# Patient Record
Sex: FEMALE | Race: WHITE | NOT HISPANIC OR LATINO | Employment: OTHER | URBAN - METROPOLITAN AREA
[De-identification: names, ages, dates, MRNs, and addresses within clinical notes are randomized per-mention and may not be internally consistent; named-entity substitution may affect disease eponyms.]

---

## 2017-03-20 DIAGNOSIS — M79.641 PAIN OF RIGHT HAND: ICD-10-CM

## 2017-03-20 DIAGNOSIS — M79.644 PAIN OF FINGER OF RIGHT HAND: ICD-10-CM

## 2017-03-28 ENCOUNTER — ALLSCRIPTS OFFICE VISIT (OUTPATIENT)
Dept: OTHER | Facility: OTHER | Age: 72
End: 2017-03-28

## 2017-05-10 ENCOUNTER — ALLSCRIPTS OFFICE VISIT (OUTPATIENT)
Dept: OTHER | Facility: OTHER | Age: 72
End: 2017-05-10

## 2017-05-10 DIAGNOSIS — F41.9 ANXIETY DISORDER: ICD-10-CM

## 2017-05-10 DIAGNOSIS — E78.5 HYPERLIPIDEMIA: ICD-10-CM

## 2017-05-10 DIAGNOSIS — M79.644 PAIN OF FINGER OF RIGHT HAND: ICD-10-CM

## 2017-05-10 DIAGNOSIS — M54.2 CERVICALGIA: ICD-10-CM

## 2017-05-10 DIAGNOSIS — F32.9 MAJOR DEPRESSIVE DISORDER, SINGLE EPISODE: ICD-10-CM

## 2017-05-10 DIAGNOSIS — E11.9 TYPE 2 DIABETES MELLITUS WITHOUT COMPLICATIONS (HCC): ICD-10-CM

## 2017-05-10 DIAGNOSIS — I10 ESSENTIAL (PRIMARY) HYPERTENSION: ICD-10-CM

## 2017-05-12 ENCOUNTER — ALLSCRIPTS OFFICE VISIT (OUTPATIENT)
Dept: OTHER | Facility: OTHER | Age: 72
End: 2017-05-12

## 2017-05-12 ENCOUNTER — APPOINTMENT (OUTPATIENT)
Dept: LAB | Facility: CLINIC | Age: 72
End: 2017-05-12
Payer: MEDICARE

## 2017-05-12 DIAGNOSIS — F41.9 ANXIETY DISORDER: ICD-10-CM

## 2017-05-12 DIAGNOSIS — E11.9 TYPE 2 DIABETES MELLITUS WITHOUT COMPLICATIONS (HCC): ICD-10-CM

## 2017-05-12 DIAGNOSIS — M79.644 PAIN OF FINGER OF RIGHT HAND: ICD-10-CM

## 2017-05-12 DIAGNOSIS — E78.5 HYPERLIPIDEMIA: ICD-10-CM

## 2017-05-12 DIAGNOSIS — F32.9 MAJOR DEPRESSIVE DISORDER, SINGLE EPISODE: ICD-10-CM

## 2017-05-12 DIAGNOSIS — I10 ESSENTIAL (PRIMARY) HYPERTENSION: ICD-10-CM

## 2017-05-12 LAB
ALBUMIN SERPL BCP-MCNC: 4 G/DL (ref 3.5–5)
ALP SERPL-CCNC: 132 U/L (ref 46–116)
ALT SERPL W P-5'-P-CCNC: 81 U/L (ref 12–78)
ANION GAP SERPL CALCULATED.3IONS-SCNC: 11 MMOL/L (ref 4–13)
AST SERPL W P-5'-P-CCNC: 37 U/L (ref 5–45)
BASOPHILS # BLD AUTO: 0.05 THOUSANDS/ΜL (ref 0–0.1)
BASOPHILS NFR BLD AUTO: 1 % (ref 0–1)
BILIRUB SERPL-MCNC: 0.63 MG/DL (ref 0.2–1)
BUN SERPL-MCNC: 15 MG/DL (ref 5–25)
CALCIUM SERPL-MCNC: 9.4 MG/DL (ref 8.3–10.1)
CHLORIDE SERPL-SCNC: 101 MMOL/L (ref 100–108)
CHOLEST SERPL-MCNC: 266 MG/DL (ref 50–200)
CO2 SERPL-SCNC: 27 MMOL/L (ref 21–32)
CREAT SERPL-MCNC: 0.71 MG/DL (ref 0.6–1.3)
CREAT UR-MCNC: 59.9 MG/DL
EOSINOPHIL # BLD AUTO: 0.38 THOUSAND/ΜL (ref 0–0.61)
EOSINOPHIL NFR BLD AUTO: 4 % (ref 0–6)
ERYTHROCYTE [DISTWIDTH] IN BLOOD BY AUTOMATED COUNT: 13.2 % (ref 11.6–15.1)
EST. AVERAGE GLUCOSE BLD GHB EST-MCNC: 163 MG/DL
GFR SERPL CREATININE-BSD FRML MDRD: >60 ML/MIN/1.73SQ M
GLUCOSE P FAST SERPL-MCNC: 138 MG/DL (ref 65–99)
HBA1C MFR BLD: 7.3 % (ref 4.2–6.3)
HCT VFR BLD AUTO: 39.5 % (ref 34.8–46.1)
HDLC SERPL-MCNC: 29 MG/DL (ref 40–60)
HGB BLD-MCNC: 14 G/DL (ref 11.5–15.4)
LYMPHOCYTES # BLD AUTO: 2.65 THOUSANDS/ΜL (ref 0.6–4.47)
LYMPHOCYTES NFR BLD AUTO: 31 % (ref 14–44)
MCH RBC QN AUTO: 33.7 PG (ref 26.8–34.3)
MCHC RBC AUTO-ENTMCNC: 35.4 G/DL (ref 31.4–37.4)
MCV RBC AUTO: 95 FL (ref 82–98)
MICROALBUMIN UR-MCNC: 8.3 MG/L (ref 0–20)
MICROALBUMIN/CREAT 24H UR: 14 MG/G CREATININE (ref 0–30)
MONOCYTES # BLD AUTO: 0.55 THOUSAND/ΜL (ref 0.17–1.22)
MONOCYTES NFR BLD AUTO: 6 % (ref 4–12)
NEUTROPHILS # BLD AUTO: 4.96 THOUSANDS/ΜL (ref 1.85–7.62)
NEUTS SEG NFR BLD AUTO: 58 % (ref 43–75)
NRBC BLD AUTO-RTO: 0 /100 WBCS
PLATELET # BLD AUTO: 246 THOUSANDS/UL (ref 149–390)
PMV BLD AUTO: 13.6 FL (ref 8.9–12.7)
POTASSIUM SERPL-SCNC: 4.4 MMOL/L (ref 3.5–5.3)
PROT SERPL-MCNC: 7.4 G/DL (ref 6.4–8.2)
RBC # BLD AUTO: 4.16 MILLION/UL (ref 3.81–5.12)
SODIUM SERPL-SCNC: 139 MMOL/L (ref 136–145)
TRIGL SERPL-MCNC: 579 MG/DL
TSH SERPL DL<=0.05 MIU/L-ACNC: 2.1 UIU/ML (ref 0.36–3.74)
WBC # BLD AUTO: 8.63 THOUSAND/UL (ref 4.31–10.16)

## 2017-05-12 PROCEDURE — 83036 HEMOGLOBIN GLYCOSYLATED A1C: CPT

## 2017-05-12 PROCEDURE — 82043 UR ALBUMIN QUANTITATIVE: CPT

## 2017-05-12 PROCEDURE — 80053 COMPREHEN METABOLIC PANEL: CPT

## 2017-05-12 PROCEDURE — 36415 COLL VENOUS BLD VENIPUNCTURE: CPT

## 2017-05-12 PROCEDURE — 85025 COMPLETE CBC W/AUTO DIFF WBC: CPT

## 2017-05-12 PROCEDURE — 80061 LIPID PANEL: CPT

## 2017-05-12 PROCEDURE — 82570 ASSAY OF URINE CREATININE: CPT

## 2017-05-12 PROCEDURE — 84443 ASSAY THYROID STIM HORMONE: CPT

## 2017-08-22 ENCOUNTER — ALLSCRIPTS OFFICE VISIT (OUTPATIENT)
Dept: OTHER | Facility: OTHER | Age: 72
End: 2017-08-22

## 2017-08-22 DIAGNOSIS — E11.9 TYPE 2 DIABETES MELLITUS WITHOUT COMPLICATIONS (HCC): ICD-10-CM

## 2017-08-22 DIAGNOSIS — I10 ESSENTIAL (PRIMARY) HYPERTENSION: ICD-10-CM

## 2017-08-22 DIAGNOSIS — E78.5 HYPERLIPIDEMIA: ICD-10-CM

## 2017-08-22 DIAGNOSIS — R74.02 NONSPECIFIC ELEVATION OF LEVELS OF TRANSAMINASE AND LACTIC ACID DEHYDROGENASE (LDH): ICD-10-CM

## 2017-08-22 DIAGNOSIS — R74.01 NONSPECIFIC ELEVATION OF LEVELS OF TRANSAMINASE AND LACTIC ACID DEHYDROGENASE (LDH): ICD-10-CM

## 2017-09-18 ENCOUNTER — GENERIC CONVERSION - ENCOUNTER (OUTPATIENT)
Dept: OTHER | Facility: OTHER | Age: 72
End: 2017-09-18

## 2018-01-09 NOTE — PROGRESS NOTES
Chief Complaint  PT IS HERE TODAY TO HAVE HER BP CK DUE TO IT BEING ELEVATED DURING HER LAST OV W/ DR DUMONT  PT'S BP IS WNL TODAY AND ALSO HER BP READINGS FROM HOME SEEM TO BE WNL, TODAY THE PATIENT DID BRING HER BP MONITOR W/ HER AND READING MATCHED THE BP I GOT TODAY  PT WAS INSTRUCTED TO CONT ALL PRESENT MEDS INCL ATENOLOL 50MG & LISINOPRIL 10MG, PT WILL KEEP HER SCHED APPT FOR 9/28/16  Active Problems    1  Anxiety disorder (300 00) (F41 9)   2  Bradycardia (427 89) (R00 1)   3  Depression (311) (F32 9)   4  Elevated ALT measurement (790 4) (R74 0)   5  Elevated AST (SGOT) (790 4) (R74 0)   6  Hyperlipidemia (272 4) (E78 5)   7  Hypertension (401 9) (I10)   8  Medicare annual wellness visit, initial (V70 0) (Z00 00)   9  Neoplasm of skin of scalp (239 2) (D49 2)   10  Screening for genitourinary condition (V81 6) (Z13 89)   11  Screening for neurological condition (V80 09) (Z13 89)   12  Second degree AV block, Mobitz type II (426 12) (I44 1)   13  Type 2 diabetes mellitus (250 00) (E11 9)   14  Vertigo (780 4) (R42)    Current Meds   1  Atenolol 50 MG Oral Tablet; TAKE 1 TABLET BY MOUTH ONCE DAILY  Requested for:   25Apr2016; Last Rx:25Apr2016 Ordered   2  Lamar Contour Next EZ w/Device Kit; TESTING 2-3 X'S QD; Therapy: 46BIX5456 to (Last Rx:23Mar2016)  Requested for: 58RTI6449 Ordered   3  Lamar Contour Next Test In [employer]; 2-3 X'S QD; Therapy: 62QEZ3173 to (Last May Riff)  Requested for: 86VTK3033 Ordered   4  Lamar Microlet Lancets Miscellaneous; TEST 2-3x A DAY/AS DIRECTED; Therapy: 91ZNB9867 to (Last May Riff)  Requested for: 98MMY8248 Ordered   5  ChlordiazePOXIDE HCl - 5 MG Oral Capsule; take 1 capsule by mouth three times a day   if needed; Therapy: 09Apr2012 to (Evaluate:14Ahi2975)  Requested for: 25PTA0813; Last   Rx:94Gza4782 Ordered   6  HealthWise Short Pen Needles 31G X 8 MM Miscellaneous; INJECTING FOUR TIMES   DAILY;    Therapy: 91UHE9671 to (Evaluate:20Oct2016) Requested for: 29VTK3578; Last   Rx:22Jun2016 Ordered   7  Imipramine HCl - 25 MG Oral Tablet; take 1 tablet by mouth twice a day; Therapy: 44YZU5596 to (Evaluate:20Apr2017)  Requested for: 55Hcz9607; Last   Rx:02Lpu5645 Ordered   8  Lisinopril 10 MG Oral Tablet; Take 1 tablet twice a day  Requested for: 94Mvf9040; Last   Rx:28Arf1087 Ordered   9  MetFORMIN HCl - 500 MG Oral Tablet; Take 1 tablet twice a day; Therapy: 37TKS5176 to (Evaluate:20Oct2016)  Requested for: 79HED2175; Last   Rx:22Jun2016 Ordered   10  Toujeo SoloStar 300 UNIT/ML Subcutaneous Solution Pen-injector; inject 25 units daily; Therapy: 04HSX3868 to (Last Ferd Bowling)  Requested for: 07KTS7531 Ordered   11  Vitamin C 500 MG Oral Capsule; 2qd Recorded   12  Vitamin E 400 UNIT Oral Tablet; take 2 tablet daily; Therapy: (Recorded:02Mar2016) to Recorded    Allergies    1  Biaxin TABS   2  Crestor TABS   3  Lipitor TABS   4  Penicillins   5  Peroxyl Spot Treatment GEL    Vitals  Signs [Data Includes: Current Encounter]    Systolic: 566  Diastolic: 82    Future Appointments    Date/Time Provider Specialty Site   09/28/2016 10:00 AM DANNY Barnes  [address]     Signatures   Electronically signed by :  Navi Gray, ; Jun 29 2016 11:19AM EST                       (Author)    Electronically signed by : DANNY Aparicio ; Jun 30 2016  8:35AM EST                       (Author)

## 2018-01-11 NOTE — PROGRESS NOTES
Chief Complaint  PT WAS HERE FOR BP CHECK ,I TOOK HER BP AND IT /84 AND HER MACHINE /102 I TOOK IT AGAIN AND IT /84 AND HER MACHINE /98 , I TOLD HER TO GO HOME AND CALL BACK IN ABOUT AN HR AND A HALF WITH ANOTHER READING AFTER RELAXING / RESTING FOR A LITTLE BIT ,SHE CALLED BACK WITH READINGS 178/94 -182/83 -162/84 ,SENT TO DR TIM LUGO SHE IS CURRENTLY TAKING ATENOLOL 50 MG 1 QD AND LISINOPRIL 10 MG BID ,SO HER INSTRUCTIONS ARE TO CHANGE LISINOPRIL TO 2 TABS BID OVER WEEKEND , TAKE BP PRIOR TO TAKING MEDS AND IF BP IS <130 OR <70 ONLY TAKE ONE, PT AWARE AND WILL CALL MON  WITH READINGS      Review of Systems    Preventive Quality 65 and Older: Falls Risk: The patient fell 0 times in the past 12 months  The patient currently has no urinary incontinence symptoms  Active Problems    1  Acute non intractable tension-type headache (339 10) (G44 209)   2  Acute pharyngitis (462) (J02 9)   3  Acute URI (465 9) (J06 9)   4  Anxiety disorder (300 00) (F41 9)   5  Bradycardia (427 89) (R00 1)   6  Cough (786 2) (R05)   7  Elevated ALT measurement (790 4) (R74 0)   8  Elevated AST (SGOT) (790 4) (R74 0)   9  Headache (784 0) (R51)   10  Hyperlipidemia (272 4) (E78 5)   11  Hypertension (401 9) (I10)   12  Medicare annual wellness visit, initial (V70 0) (Z00 00)   13  Neoplasm of skin of scalp (239 2) (D49 2)   14  Pain of right hand (729 5) (M79 641)   15  Pain of right thumb (729 5) (M79 644)   16  Pain, joint, shoulder, left (719 41) (M25 512)   17  Rales (786 7) (R09 89)   18  Recurrent major depressive disorder, in partial remission (296 35) (F33 41)   19  Screening for genitourinary condition (V81 6) (Z13 89)   20  Screening for neurological condition (V80 09) (Z13 89)   21  Second degree AV block, Mobitz type II (426 12) (I44 1)   22  Type 2 diabetes mellitus (250 00) (E11 9)   23  Vertigo (780 4) (R42)    Current Meds   1   Atenolol 50 MG Oral Tablet; TAKE 1 TABLET BY MOUTH ONCE DAILY  Requested for:   77IQM5844; Last RV:85BNP7783 Ordered   2  Lamar Contour Next Test In [employer]; 2-3 X'S QD; Therapy: 62DXI0372 to (Last Delmi Gibson)  Requested for: 27MDU0583 Ordered   3  ChlordiazePOXIDE HCl - 5 MG Oral Capsule; take 1 capsule by mouth three times a day   if needed; Therapy: 09Apr2012 to (Evaluate:60Xig2778)  Requested for: 01LCM3262; Last   Rx:13Mar2017 Ordered   4  Imipramine HCl - 25 MG Oral Tablet; take 1 tablet by mouth qA and 2 tab qP; Therapy: 78NXE3264 to (Vivek Matthew)  Requested for: 46YWG9623; Last   Rx:89Mjk7268 Ordered   5  Lisinopril 10 MG Oral Tablet; Take 1 tablet twice a day  Requested for: 68ZNE3539; Last   Rx:10Nov2016 Ordered   6  MetFORMIN HCl - 500 MG Oral Tablet; take 1 tablet by mouth twice a day with food; Therapy: 99KBE7424 to (Evaluate:01Jun2017)  Requested for: 99YDU6730; Last   Rx:34Vys6193 Ordered   7  Vitamin C 500 MG Oral Capsule; 2qd Recorded   8  Vitamin E 400 UNIT Oral Tablet; take 2 tablet daily; Therapy: (Recorded:02Mar2016) to Recorded    Allergies    1  Biaxin TABS   2  Crestor TABS   3  Lipitor TABS   4  Penicillins   5   Peroxyl Spot Treatment GEL    Plan  Pain of right thumb    · 2 Krystian Chacko MD, Jaime Cintron  (Orthopedic Surgery) Co-Management  *  Status: Active  Requested  for: 35BIE0246  Care Summary provided  : Yes    Signatures   Electronically signed by : Stuart Sethi, ; May 12 2017 12:45PM EST                       (Author)    Electronically signed by : DANNY Harvey ; May 13 2017  7:41PM EST                       (Author)

## 2018-01-12 VITALS
TEMPERATURE: 96.7 F | BODY MASS INDEX: 26.56 KG/M2 | DIASTOLIC BLOOD PRESSURE: 72 MMHG | WEIGHT: 136 LBS | RESPIRATION RATE: 16 BRPM | SYSTOLIC BLOOD PRESSURE: 126 MMHG | HEART RATE: 72 BPM

## 2018-01-12 NOTE — PROGRESS NOTES
Assessment    1  Elevated ALT measurement (790 4) (R74 0)   2  Hyperlipidemia (272 4) (E78 5)   3  Hypertension (401 9) (I10)   4  Type 2 diabetes mellitus (250 00) (E11 9)    Plan  Elevated ALT measurement, Hyperlipidemia, Hypertension, Type 2 diabetes mellitus    · (1) CBC/PLT/DIFF; Status:Active; Requested for:22Aug2017;    · (1) COMPREHENSIVE METABOLIC PANEL; Status:Active; Requested for:22Aug2017;    · (1) HEMOGLOBIN A1C; Status:Active; Requested for:22Aug2017;    · (1) LIPID PANEL, FASTING; Status:Active; Requested for:22Aug2017;   Hypertension    · AmLODIPine Besylate 5 MG Oral Tablet; take 1 tablet by mouth every day  Type 2 diabetes mellitus    · *VB - Foot Exam; Status:Complete;   Done: 22Aug2017 12:00AM    Discussion/Summary    #1 DMII - BGs elevated due to noncompliance with diet  Urged compliance as well as increased ambulation  Recheck lab and f/u 6m - earlier if BGs are still elevated  #2 hypertension - better but SBP still sl elevated  ? Better when pt is calm  Cont present care and recehck as above  #3 hyperlipidemia - TGs still elevated  I discussed with pt - we discussed treatment  Pt will focus on diet - recheck labs in 2m  If still elevated, consider meds  #4 elevated ALT - secondary to fatty liver and elevated TGs? Treat as above and recheck as above  #5 HM - Patient to follow-up as above  I discussed flu shot  Recheck as above  Patient to call for problems or concerns in the interim  The patient was counseled regarding diagnostic results, instructions for management, risk factor reductions, prognosis, patient and family education, impressions, risks and benefits of treatment options, importance of compliance with treatment  Possible side effects of new medications were reviewed with the patient/guardian today  The treatment plan was reviewed with the patient/guardian   The patient/guardian understands and agrees with the treatment plan      Chief Complaint  follow up visit      History of Present Illness  as above  - pt doing well  Some increased stress (family)  Pt also feels lonely  No suicidality  - pt cheating more on diet  Labs in may showed increased A1C (7 3)  TGs still > 500    - home BPs sl labile though pt states that it is due to stress  Pt denies CP, palp, lightheadedness or other CV symptoms with or without exertion  - no GI or  complaints  - no extremity complaints      Review of Systems    Constitutional: as noted in HPI  Eyes: No complaints of eye pain, no red eyes, no eyesight problems, no discharge, no dry eyes, no itching of eyes  ENT: no complaints of earache, no loss of hearing, no nose bleeds, no nasal discharge, no sore throat, no hoarseness  Cardiovascular: No complaints of slow heart rate, no fast heart rate, no chest pain, no palpitations, no leg claudication, no lower extremity edema  Respiratory: No complaints of shortness of breath, no wheezing, no cough, no SOB on exertion, no orthopnea, no PND  Gastrointestinal: No complaints of abdominal pain, no constipation, no nausea or vomiting, no diarrhea, no bloody stools  Genitourinary: No complaints of dysuria, no incontinence, no pelvic pain, no dysmenorrhea, no vaginal discharge or bleeding  Musculoskeletal: No complaints of arthralgias, no myalgias, no joint swelling or stiffness, no limb pain or swelling  Integumentary: No complaints of skin rash or lesions, no itching, no skin wounds, no breast pain or lump  Neurological: No complaints of headache, no confusion, no convulsions, no numbness, no dizziness or fainting, no tingling, no limb weakness, no difficulty walking  Psychiatric: as noted in HPI  Endocrine: No complaints of proptosis, no hot flashes, no muscle weakness, no deepening of the voice, no feelings of weakness  Hematologic/Lymphatic: No complaints of swollen glands, no swollen glands in the neck, does not bleed easily, does not bruise easily  Active Problems    1   Acute non intractable tension-type headache (339 10) (G44 209)   2  Acute pharyngitis (462) (J02 9)   3  Acute URI (465 9) (J06 9)   4  Anxiety disorder (300 00) (F41 9)   5  Bradycardia (427 89) (R00 1)   6  Cervicalgia (723 1) (M54 2)   7  Cough (786 2) (R05)   8  Elevated ALT measurement (790 4) (R74 0)   9  Elevated AST (SGOT) (790 4) (R74 0)   10  Headache (784 0) (R51)   11  Hyperlipidemia (272 4) (E78 5)   12  Hypertension (401 9) (I10)   13  Medicare annual wellness visit, initial (V70 0) (Z00 00)   14  Neoplasm of skin of scalp (239 2) (D49 2)   15  Pain of right hand (729 5) (M79 641)   16  Pain of right thumb (729 5) (M79 644)   17  Pain, joint, shoulder, left (719 41) (M25 512)   18  Rales (786 7) (R09 89)   19  Recurrent major depressive disorder, in partial remission (296 35) (F33 41)   20  Screening for genitourinary condition (V81 6) (Z13 89)   21  Screening for neurological condition (V80 09) (Z13 89)   22  Second degree AV block, Mobitz type II (426 12) (I44 1)   23  Type 2 diabetes mellitus (250 00) (E11 9)   24  Vertigo (780 4) (R42)    Past Medical History    1  History of Anxiety (300 00) (F41 9)   2  History of Cough (786 2) (R05)   3  History of acute bronchitis (V12 69) (Z87 09)   4  History of acute sinusitis (V12 69) (Z87 09)   5  History of gastroenteritis (V12 79) (Z87 19)   6  History of insect bite (V15 59) (Z87 828)   7  History of sebaceous cyst (V13 3) (Z87 2)   8  History of Poison ivy dermatitis (692 6) (L23 7)    The active problems and past medical history were reviewed and updated today  Surgical History    1  History of Pacemaker Permanent Placement    The surgical history was reviewed and updated today  Family History  Sister    1  Family history of dementia (V17 2) (Z81 8)   2   Family history of type 2 diabetes mellitus (V18 0) (Z83 3)    Social History    · Current Every Day Smoker (305 1)   · Marital History -    · Occupation:  The social history was reviewed and updated today  Current Meds   1  AmLODIPine Besylate 5 MG Oral Tablet; take 1 tablet by mouth every day; Therapy: 71DAA3741 to [phone])  Requested for: 85VFO7474; Last   Rx:88Wpo0598 Ordered   2  Atenolol 50 MG Oral Tablet; TAKE 1 TABLET BY MOUTH ONCE DAILY  Requested for:   37IWK2138; Last ZB:58MVB1753 Ordered   3  Lamar Contour Next Test In [employer]; 2-3 X'S QD; Therapy: 51QNQ3209 to (Last Vi Borrego)  Requested for: 06CCA2751 Ordered   4  ChlordiazePOXIDE HCl - 5 MG Oral Capsule; take 1 capsule by mouth three times a day   if needed; Therapy: 69Thl6176 to (Gilmer Lassiter)  Requested for: 11Aug2017; Last   Rx:11Aug2017 Ordered   5  Imipramine HCl - 25 MG Oral Tablet; take 1 tablet by mouth qA and 2 tab qP; Therapy: 05MSA9531 to (Shana Velez)  Requested for: 98EYW7556; Last   Rx:99Oxt6487 Ordered   6  Lisinopril 20 MG Oral Tablet; TAKE 1 TABLET TWICE DAILY  Requested for: 11Aug2017;   Last Rx:11Aug2017 Ordered   7  MetFORMIN HCl - 500 MG Oral Tablet; take 1 tablet by mouth twice a day with food; Therapy: 36MHF0076 to (Evaluate:01Jun2017)  Requested for: 54RVB9489; Last   Rx:91Hlz3072 Ordered   8  Vitamin C 500 MG Oral Capsule; 2qd Recorded   9  Vitamin E 400 UNIT Oral Tablet; take 2 tablet daily; Therapy: (Recorded:02Mar2016) to Recorded    The medication list was reviewed and updated today  Allergies    1  Biaxin TABS   2  Crestor TABS   3  Lipitor TABS   4  Penicillins   5  Peroxyl Spot Treatment GEL    Vitals  Vital Signs    Recorded: 22Aug2017 10:42AM   Temperature 97 5 F   Heart Rate 72   Systolic 629   Diastolic 84   Height 5 ft    Weight 141 lb    BMI Calculated 27 54   BSA Calculated 1 61     Physical Exam    Constitutional   General appearance: No acute distress, well appearing and well nourished  Head and Face   Head and face: Normal     Palpation of the face and sinuses: No sinus tenderness      Eyes   Conjunctiva and lids: No swelling, erythema or discharge  Pupils and irises: Equal, round, reactive to light  Ophthalmoscopic examination: Abnormal   Fundi difficult to see the no gross papilledema appreciated  Ears, Nose, Mouth, and Throat   External inspection of ears and nose: Normal     Otoscopic examination: Tympanic membranes translucent with normal light reflex  Canals patent without erythema  Nasal mucosa, septum, and turbinates: Normal without edema or erythema  Lips, teeth, and gums: Normal, good dentition  Oropharynx: Normal with no erythema, edema, exudate or lesions  Neck   Neck: Supple, symmetric, trachea midline, no masses  Pulmonary   Respiratory effort: No increased work of breathing or signs of respiratory distress  Auscultation of lungs: Abnormal   Scattered dry crackles in the right base - unchanged  Cardiovascular   Auscultation of heart: Normal rate and rhythm, normal S1 and S2, no murmurs  Carotid pulses: 2+ bilaterally  Abdominal aorta: Normal     Pedal pulses: 2+ bilaterally  Peripheral vascular exam: Normal     Examination of extremities for edema and/or varicosities: Normal     Abdomen   Abdomen: Non-tender, no masses  Liver and spleen: No hepatomegaly or splenomegaly  Lymphatic   Palpation of lymph nodes in neck: No lymphadenopathy  Palpation of lymph nodes in other areas: No lymphadenopathy  Musculoskeletal   Gait and station: Normal     Digits and nails: Abnormal   Mild diffuse osteoarthritis changes  Joints, bones, and muscles: Normal     Muscle strength/tone: Normal     Skin   Skin and subcutaneous tissue: Normal without rashes or lesions  Neurologic   Cranial nerves: Cranial nerves II-XII intact  Cortical function: Normal mental status  Reflexes: 2+ and symmetric  Sensation: No sensory loss  Psychiatric   Judgment and insight: Normal     Orientation to person, place, and time: Normal     Recent and remote memory: Intact  Mood and affect: Abnormal   As above  PHQ-9 = 4  Socks and shoes removed, Right Foot Findings: normal foot, no swelling, no erythema  The right toes were normal and had full range of motion  The sensory exam showed normal vibratory sensation at the level of the toes on the right  Normal tactile sensation with monofilament testing throughout the right foot  Socks and shoes removed, Left Foot Findings: normal foot, no swelling, no erythema  The left toes were normal and had full range of motion  The sensory exam showed normal vibratory sensation at the level of the toes on the left  Normal tactile sensation with monofilament testing throughout the left foot  Pulses:   2+ in the dorsalis pedis on the right  Pulses:   2+ in the dorsalis pedis on the left  Assign Risk Category: 0: No loss of protective sensation, no deformity  No present risk  Future Appointments    Date/Time Provider Specialty Site   12/06/2017 01:00 PM DANNY Chance   [address] [facility]     Signatures   Electronically signed by : DANNY Barajas ; Aug 23 2017  7:49AM EST                       (Author)

## 2018-01-14 VITALS
BODY MASS INDEX: 28.07 KG/M2 | SYSTOLIC BLOOD PRESSURE: 170 MMHG | WEIGHT: 143 LBS | HEART RATE: 76 BPM | RESPIRATION RATE: 12 BRPM | TEMPERATURE: 97 F | DIASTOLIC BLOOD PRESSURE: 82 MMHG | HEIGHT: 60 IN

## 2018-01-14 VITALS
BODY MASS INDEX: 27.68 KG/M2 | SYSTOLIC BLOOD PRESSURE: 142 MMHG | DIASTOLIC BLOOD PRESSURE: 84 MMHG | HEIGHT: 60 IN | HEART RATE: 72 BPM | TEMPERATURE: 97.5 F | WEIGHT: 141 LBS

## 2018-01-15 NOTE — RESULT NOTES
Verified Results  (1) LIPID PANEL, FASTING 28Fjn4450 07:10AM Jesenia Pope Human     Test Name Result Flag Reference   CHOLESTEROL, TOTAL 257 mg/dL H 125-200   HDL CHOLESTEROL 28 mg/dL L > OR = 46   TRIGLICERIDES 765 mg/dL H <150   LDL-CHOLESTEROL  mg/dL (calc)  <130   LDL cholesterol not calculated  Triglyceride levels  greater than 400 mg/dL invalidate calculated LDL results  Desirable range <100 mg/dL for patients with CHD or  diabetes and <70 mg/dL for diabetic patients with  known heart disease  CHOL/HDLC RATIO 9 2 (calc) H < OR = 5 0   NON HDL CHOLESTEROL 229 mg/dL (calc) H    Target for non-HDL cholesterol is 30 mg/dL higher than   LDL cholesterol target  (Q) MICROALBUMIN, RANDOM URINE (W/CREATININE) 82Zhj3872 07:10AM Jesenia Pope [facility]     Test Name Result Flag Reference   CREATININE, RANDOM URINE 106 mg/dL     MICROALBUMIN 1 2 mg/dL     Reference Range  Not established   MICROALBUMIN/CREATININE$RATIO, RANDOM URINE 11 mcg/mg creat  <30   The ADA defines abnormalities in albumin  excretion as follows:     Category         Result (mcg/mg creatinine)     Normal                    <30  Microalbuminuria            Clinical albuminuria   > OR = 300     The ADA recommends that at least two of three  specimens collected within a 3-6 month period be  abnormal before considering a patient to be  within a diagnostic category  (1) COMPREHENSIVE METABOLIC PANEL [id number] 32:71FC Jesenia Pope Human     Test Name Result Flag Reference   GLUCOSE 128 mg/dL H 65-99   Fasting reference interval   UREA NITROGEN (BUN) 29 mg/dL H 7-25   CREATININE 0 83 mg/dL  0 60-0 93   For patients >52years of age, the reference limit  for Creatinine is approximately 13% higher for people  identified as -American  eGFR NON-AFR   AMERICAN 71 mL/min/1 73m2  > OR = 60   eGFR AFRICAN AMERICAN 83 mL/min/1 73m2  > OR = 60   BUN/CREATININE RATIO 35 (calc) H 6-22   ALT 33 U/L H 6-29   ALBUMIN 4 2 g/dL  3 6-5 1   GLOBULIN 2 5 g/dL (calc)  1 9-3 7   ALBUMIN/GLOBULIN RATIO 1 7 (calc)  1 0-2 5   BILIRUBIN, TOTAL 0 6 mg/dL  0 2-1 2   ALKALINE PHOSPHATASE 104 U/L     AST 20 U/L  10-35   SODIUM 140 mmol/L  135-146   POTASSIUM 4 5 mmol/L  3 5-5 3   CHLORIDE 103 mmol/L     CARBON DIOXIDE 28 mmol/L  20-31   CALCIUM 9 5 mg/dL  8 6-10 4   PROTEIN, TOTAL 6 7 g/dL  6 1-8 1     (1) CBC/PLT/DIFF 22Sep2016 07:10AM Jesenia Pope Human     Test Name Result Flag Reference   WHITE BLOOD CELL COUNT 12 5 Thousand/uL H 3 8-10 8   RED BLOOD CELL COUNT 4 22 Million/uL  3 80-5 10   HEMOGLOBIN 14 2 g/dL  11 7-15 5   HEMATOCRIT 39 2 %  35 0-45 0   MCV 93 1 fL  80 0-100 0   MCH 33 6 pg H 27 0-33 0   EOSINOPHILS 5 3 %     BASOPHILS 0 4 %     ABSOLUTE MONOCYTES 638 cells/uL  200-950   ABSOLUTE EOSINOPHILS 663 cells/uL H    ABSOLUTE BASOPHILS 50 cells/uL  0-200   NEUTROPHILS 62 9 %     LYMPHOCYTES 26 3 %     MONOCYTES 5 1 %     MCHC 36 2 g/dL H 32 0-36 0   RDW 13 5 %  11 0-15 0   PLATELET COUNT 406 Thousand/uL  140-400   MPV 12 0 fL H 7 5-11 5   ABSOLUTE NEUTROPHILS 7863 cells/uL H 1225-2777   ABSOLUTE LYMPHOCYTES 3288 cells/uL  850-3900     (Q) TSH, 3RD GENERATION 22Sep2016 07:10AM Jesenia Pope Human     Test Name Result Flag Reference   TSH 2 02 mIU/L  0 40-4 50     (Q) HEMOGLOBIN A1c 22Sep2016 07:10AM Ludwig Pope   REPORT COMMENT:  FASTING:YES     Test Name Result Flag Reference   HEMOGLOBIN A1c 6 2 % of total Hgb H <5 7   According to ADA guidelines, hemoglobin A1c <7 0%  represents optimal control in non-pregnant diabetic  patients  Different metrics may apply to specific  patient populations  Standards of Medical Care in    Diabetes Care   2013;36:s11-s66     For the purpose of screening for the presence of  diabetes  <5 7%       Consistent with the absence of diabetes  5 7-6 4%    Consistent with increased risk for diabetes              (prediabetes)  >or=6 5%    Consistent with diabetes     This assay result is consistent with a higher risk  of diabetes  Currently, no consensus exists for use of hemoglobin  A1c for diagnosis of diabetes for children

## 2018-01-16 NOTE — RESULT NOTES
Verified Results  (1) COMPREHENSIVE METABOLIC PANEL [id number] 68:53CV Surjit Pope     Test Name Result Flag Reference   GLUCOSE 146 mg/dL H 65-99   Fasting reference interval   UREA NITROGEN (BUN) 21 mg/dL  7-25   CREATININE 0 79 mg/dL  0 60-0 93   For patients >52years of age, the reference limit  for Creatinine is approximately 13% higher for people  identified as -American  eGFR NON-AFR  AMERICAN 76 mL/min/1 73m2  > OR = 60   eGFR AFRICAN AMERICAN 88 mL/min/1 73m2  > OR = 60   BUN/CREATININE RATIO   4-82   NOT APPLICABLE (calc)   SODIUM 139 mmol/L  135-146   POTASSIUM 4 4 mmol/L  3 5-5 3   CHLORIDE 104 mmol/L     CARBON DIOXIDE 26 mmol/L  19-30   CALCIUM 9 2 mg/dL  8 6-10 4   PROTEIN, TOTAL 6 8 g/dL  6 1-8 1   ALBUMIN 4 0 g/dL  3 6-5 1   GLOBULIN 2 8 g/dL (calc)  1 9-3 7   ALBUMIN/GLOBULIN RATIO 1 4 (calc)  1 0-2 5   BILIRUBIN, TOTAL 0 8 mg/dL  0 2-1 2   ALKALINE PHOSPHATASE 103 U/L     AST 29 U/L  10-35   ALT 53 U/L H 6-29     (Q) HEMOGLOBIN A1c [id number] 07:43AM Ludwig Pope   REPORT COMMENT:  FASTING:YES     Test Name Result Flag Reference   HEMOGLOBIN A1c 6 9 % of total Hgb H <5 7   According to ADA guidelines, hemoglobin A1c <7 0%  represents optimal control in non-pregnant diabetic  patients  Different metrics may apply to specific  patient populations  Standards of Medical Care in  [phone]  Diabetes Care  2013;36:s11-s66     For the purpose of screening for the presence of  diabetes  <5 7%       Consistent with the absence of diabetes  5 7-6 4%    Consistent with increased risk for diabetes              (prediabetes)  >or=6 5%    Consistent with diabetes     This assay result is consistent with diabetes  mellitus  Currently, no consensus exists for use of hemoglobin  A1c for diagnosis of diabetes for children

## 2018-01-22 VITALS
BODY MASS INDEX: 28.27 KG/M2 | SYSTOLIC BLOOD PRESSURE: 192 MMHG | HEART RATE: 84 BPM | DIASTOLIC BLOOD PRESSURE: 94 MMHG | WEIGHT: 144 LBS | TEMPERATURE: 97.2 F | HEIGHT: 60 IN

## 2018-01-24 ENCOUNTER — TELEPHONE (OUTPATIENT)
Dept: FAMILY MEDICINE CLINIC | Facility: CLINIC | Age: 73
End: 2018-01-24

## 2018-01-24 DIAGNOSIS — J01.90 ACUTE SINUSITIS, RECURRENCE NOT SPECIFIED, UNSPECIFIED LOCATION: Primary | ICD-10-CM

## 2018-01-24 RX ORDER — AZITHROMYCIN 250 MG/1
TABLET, FILM COATED ORAL
Qty: 6 TABLET | Refills: 0 | Status: SHIPPED | OUTPATIENT
Start: 2018-01-24 | End: 2018-01-29

## 2018-01-29 ENCOUNTER — TELEPHONE (OUTPATIENT)
Dept: FAMILY MEDICINE CLINIC | Facility: CLINIC | Age: 73
End: 2018-01-29

## 2018-01-29 NOTE — TELEPHONE ENCOUNTER
Spoke with Dr Lisa Pitt and [facility]  Discussed lab results and other issues  She is concerned re: pt's mood   We will call pt to bring her in for visit this week or next

## 2018-02-13 ENCOUNTER — OFFICE VISIT (OUTPATIENT)
Dept: FAMILY MEDICINE CLINIC | Facility: CLINIC | Age: 73
End: 2018-02-13
Payer: MEDICARE

## 2018-02-13 VITALS
WEIGHT: 143.8 LBS | TEMPERATURE: 98.7 F | DIASTOLIC BLOOD PRESSURE: 84 MMHG | SYSTOLIC BLOOD PRESSURE: 144 MMHG | HEIGHT: 60 IN | BODY MASS INDEX: 28.23 KG/M2 | HEART RATE: 76 BPM

## 2018-02-13 DIAGNOSIS — Z13.5 SCREENING FOR GLAUCOMA: ICD-10-CM

## 2018-02-13 DIAGNOSIS — Z11.59 NEED FOR HEPATITIS C SCREENING TEST: ICD-10-CM

## 2018-02-13 DIAGNOSIS — I10 ESSENTIAL HYPERTENSION: ICD-10-CM

## 2018-02-13 DIAGNOSIS — E78.2 MIXED HYPERLIPIDEMIA: ICD-10-CM

## 2018-02-13 DIAGNOSIS — Z00.00 MEDICARE ANNUAL WELLNESS VISIT, SUBSEQUENT: Primary | ICD-10-CM

## 2018-02-13 DIAGNOSIS — E11.9 TYPE 2 DIABETES MELLITUS WITHOUT COMPLICATION, WITHOUT LONG-TERM CURRENT USE OF INSULIN (HCC): ICD-10-CM

## 2018-02-13 DIAGNOSIS — Z13.820 SCREENING FOR OSTEOPOROSIS: ICD-10-CM

## 2018-02-13 DIAGNOSIS — Z12.39 SCREENING FOR MALIGNANT NEOPLASM OF BREAST: ICD-10-CM

## 2018-02-13 DIAGNOSIS — Z12.11 SCREENING FOR COLON CANCER: ICD-10-CM

## 2018-02-13 DIAGNOSIS — F41.9 ANXIETY: ICD-10-CM

## 2018-02-13 PROBLEM — F33.41 RECURRENT MAJOR DEPRESSIVE DISORDER, IN PARTIAL REMISSION (HCC): Status: ACTIVE | Noted: 2017-05-10

## 2018-02-13 PROBLEM — M54.2 NECK PAIN: Status: ACTIVE | Noted: 2017-06-06

## 2018-02-13 PROCEDURE — G0439 PPPS, SUBSEQ VISIT: HCPCS | Performed by: FAMILY MEDICINE

## 2018-02-13 PROCEDURE — 99214 OFFICE O/P EST MOD 30 MIN: CPT | Performed by: FAMILY MEDICINE

## 2018-02-13 RX ORDER — IMIPRAMINE HCL 25 MG
25 TABLET ORAL 2 TIMES DAILY
Qty: 180 TABLET | Refills: 3 | Status: SHIPPED | OUTPATIENT
Start: 2018-02-13 | End: 2018-12-04 | Stop reason: SDUPTHER

## 2018-02-13 RX ORDER — AMLODIPINE BESYLATE 5 MG/1
5 TABLET ORAL DAILY
Qty: 90 TABLET | Refills: 3 | Status: SHIPPED | OUTPATIENT
Start: 2018-02-13 | End: 2019-05-01 | Stop reason: SDUPTHER

## 2018-02-13 RX ORDER — ATENOLOL 50 MG/1
50 TABLET ORAL DAILY
Qty: 90 TABLET | Refills: 3 | Status: SHIPPED | OUTPATIENT
Start: 2018-02-13 | End: 2018-05-07 | Stop reason: SDUPTHER

## 2018-02-13 RX ORDER — CHLORDIAZEPOXIDE HYDROCHLORIDE 5 MG/1
CAPSULE, GELATIN COATED ORAL
COMMUNITY
Start: 2012-04-09 | End: 2018-02-13 | Stop reason: SDUPTHER

## 2018-02-13 RX ORDER — LISINOPRIL 20 MG/1
1 TABLET ORAL 2 TIMES DAILY
COMMUNITY
End: 2018-02-13 | Stop reason: SDUPTHER

## 2018-02-13 RX ORDER — CYANOCOBALAMIN (VITAMIN B-12) 500 MCG
2 LOZENGE ORAL DAILY
COMMUNITY

## 2018-02-13 RX ORDER — ATENOLOL 50 MG/1
1 TABLET ORAL DAILY
COMMUNITY
End: 2018-02-13 | Stop reason: SDUPTHER

## 2018-02-13 RX ORDER — AMLODIPINE BESYLATE 5 MG/1
1 TABLET ORAL DAILY
COMMUNITY
Start: 2017-05-25 | End: 2018-02-13 | Stop reason: SDUPTHER

## 2018-02-13 RX ORDER — ASCORBIC ACID 500 MG
TABLET ORAL DAILY
COMMUNITY

## 2018-02-13 RX ORDER — LISINOPRIL 20 MG/1
20 TABLET ORAL 2 TIMES DAILY
Qty: 90 TABLET | Refills: 3 | Status: SHIPPED | OUTPATIENT
Start: 2018-02-13 | End: 2019-01-25 | Stop reason: SDUPTHER

## 2018-02-13 RX ORDER — IMIPRAMINE HCL 25 MG
TABLET ORAL
COMMUNITY
Start: 2014-07-25 | End: 2018-02-13 | Stop reason: SDUPTHER

## 2018-02-13 RX ORDER — CHLORDIAZEPOXIDE HYDROCHLORIDE 5 MG/1
5 CAPSULE, GELATIN COATED ORAL 2 TIMES DAILY
Qty: 180 CAPSULE | Refills: 3 | Status: SHIPPED | OUTPATIENT
Start: 2018-02-13 | End: 2018-09-20 | Stop reason: SDUPTHER

## 2018-02-13 NOTE — PATIENT INSTRUCTIONS
Thank you for enrolling in [facility]  Please follow the instructions below to securely access your online medical record  [employer] allows you to send messages to your doctor, view your test results, renew your prescriptions, schedule appointments, and more  [address] uses Single Sign on (SSO) Technology for you to log in and access our [facility], including [employer]  No more remembering multiple user names and passwords! We are going to guide you through, step by step, to help you set up your [employer] account which will provide access to your [employer] account  How Do I Sign Up? 1  In your Internet browser, go to [email or web address]       2  Click on the   [employer] patient account and then click Dont have an                 Account? Create one now      3  Enter your demographic information and chose a user name (email address) and password  Think of one that is secure and easy to remember  Enter a Referral code if you have one (this is not your [employer] code ) Accept the Terms and Conditions and the Privacy Policy  4  Select your security questions that you will use to reset your password should you forget it  Click Submit  5  Enter your [employer] Activation Code exactly as it appears below  You will not need to use this code after you have completed the sign-up process  If you do not sign up before the expiration date, you must request a new code  [employer] Activation Code: Activation code not generated  Current [employer] Status: Patient Declined    6  Confirm your email address  An email confirmation was sent to you  Please open that email and click Confirm your Email   You should then be redirected to our [employer] Single sign on page, where you will log on with the user name and password you created! Proceed to the [employer] Icon to view your personal health information          Additional Information  If you have questions, you can e-mail [email or web address] or call [phone] to talk to our customer support staff  Remember, MyChart is NOT to be used for urgent needs  For medical emergencies, dial 911

## 2018-02-13 NOTE — PROGRESS NOTES
HPI:  Krystian Lr is a 67 y o  female here for her Subsequent Wellness Visit  Patient Active Problem List   Diagnosis    Anxiety disorder    Neck pain    Elevated ALT measurement    Elevated AST (SGOT)    Hyperlipidemia    Hypertension    Recurrent major depressive disorder, in partial remission ([facility] )    Second degree AV block, Mobitz type II    Type 2 diabetes mellitus ([email or web address] )     Past Medical History:   Diagnosis Date    Gastroenteritis      Past Surgical History:   Procedure Laterality Date    ATRIAL CARDIAC PACEMAKER INSERTION       Family History   Problem Relation Age of Onset    Diabetes Sister     Dementia Sister      History   Smoking Status    Current Every Day Smoker   Smokeless Tobacco    Not on file     History   Alcohol use Not on file      History   Drug use: Unknown     /84   Pulse 76   Temp 98 7 °F (37 1 °C)   Ht 5' (1 524 m)   Wt 65 2 kg (143 lb 12 8 oz)   BMI 28 08 kg/m²       Current Outpatient Prescriptions   Medication Sig Dispense Refill    amLODIPine (NORVASC) 5 mg tablet Take 1 tablet (5 mg total) by mouth daily 90 tablet 3    ascorbic acid (VITAMIN C) 500 mg tablet Take by mouth daily      atenolol (TENORMIN) 50 mg tablet Take 1 tablet (50 mg total) by mouth daily 90 tablet 3    chlordiazePOXIDE (LIBRIUM) 5 mg capsule Take 1 capsule (5 mg total) by mouth 2 (two) times a day 180 capsule 3    Glucose Blood (ZHANG CONTOUR NEXT TEST VI) by In Vitro route      imipramine (TOFRANIL) 25 mg tablet Take 1 tablet (25 mg total) by mouth 2 (two) times a day 180 tablet 3    lisinopril (ZESTRIL) 20 mg tablet Take 1 tablet (20 mg total) by mouth 2 (two) times a day 90 tablet 3    metFORMIN (GLUCOPHAGE) 500 mg tablet Take 1 tablet (500 mg total) by mouth 2 (two) times a day with meals 180 tablet 3    Vitamin E 400 units TABS Take 2 tablets by mouth daily       No current facility-administered medications for this visit        Allergies   Allergen Reactions    Atorvastatin      Reaction Date: 23Dec2011;     Clarithromycin      Reaction Date: 23Dec2011;     Hydrogen Peroxide      Reaction Date: 23Dec2011;     Levofloxacin     Other      Perioxol (mouth rinse)    Penicillins     Rosuvastatin      Reaction Date: 23Dec2011;      Immunization History   Administered Date(s) Administered    Tuberculin Skin Test-PPD Intradermal 04/14/2008       Patient Care Team:  Isidra Lay MD as PCP - Raine Bustamante MD      Medicare Screening Tests and Risk Assessments:  AWV Clinical     ISAR:       Once in a Lifetime Medicare Screening:       Medicare Screening Tests and Risk Assessment:   AAA Risk Assessment    Osteoporosis Risk Assessment    HIV Risk Assessment        Drug and Alcohol Use:   Tobacco use    Tobacco use duration    Tobacco Cessation Readiness    Alcohol use    Alcohol Treatment Readiness   Illicit Drug Use        Diet & Exercise:   Diet   How many servings a day of the following:   Exercise        Cognitive Impairment Screening:   Cognitive Impairment Screening        Functional Ability/Level of Safety:   Hearing    Hearing Impairment Assessment    Current Activities    Help needed with the folllowing:    ADL    Fall Risk   Injury History       Home Safety:   Home Safety Risk Factors       Advanced Directives:   Advanced Directives    Patient's End of Life Decisions        Urinary Incontinence:       Glaucoma:            Provider Screening     Preventative Screening/Counseling:   Cardiovascular Screening/Counseling:   (Labs Q5 years, EKG optional one-time)   General:  Risks and Benefits Discussed Counseling:  Healthy Diet, Improve Blood Pressure, Improve Exercise Tolerance, Improve Cholesterol          Diabetes Screening/Counseling:   (2 tests/year if Pre-Diabetes or 1 test/year if no Diabetes)   General:  Screening Not Indicated           Colorectal Cancer Screening/Counseling:   (FOBT Q1 yr; Flex Sig Q4 yrs or Q10 yrs after Screening Colonoscopy; Screening Colonoscpy Q2 yrs High Risk or Q10 yrs Low Risk; Barium Enema Q2 yrs High Risk or Q4 yrs Low Risk)   General:  Patient Declines, Risks and Benefits Discussed           Prostate Cancer Screening/Counseling:   (Annual)          Breast Cancer Screening/Counseling:   (Baseline Age 28 - 43; Annual Age 36+)   General:  Patient Declines, Risks and Benefits Discussed          Cervical Cancer Screening/Counseling:   (Annual for High Risk or Childbearing Age with Abnormal Pap in Last 3 yrs; Every 2 all others)   General:  Risks and Benefits Discussed, Patient Declines           Osteoporosis Screening/Counseling:   (Every 2 Yrs if at risk or more if medically necessary)   General:  Risks and Benefits Discussed, Patient Declines           AAA Screening/Counseling:   (Once per Lifetime with risk factors)          Glaucoma Screening/Counseling:   (Annual)   General:  Risks and Benefits Discussed, Screening Current          HIV Screening/Counseling:   (Voluntary; Once annually for high risk OR 3 times for Pregnancy at diagnosis of IUP; 3rd trimester; and at Labor         Hepatitis C Screening:   Hepatitis C Counseling Provided:   Yes               Immunizations:   Influenza (annual):  Risks & Benefits Discussed, Patient Declines   Pneumococcal (Once in a Lifetime):  Risks & Benefits Discussed, Patient Declines   Zostavax (Medicare D Coverage, Pt >70 yo):  Risks & Benefits Discussed, Patient Declines   TD (Non-Medicare Wellness  Visit required):  Risks & Benefits Discussed, Patient Declines       Other Preventative Couseling (Non-Medicare Wellness Visit Required):   nutrition counseling performed, weight reduction was discussed, Increased physical activity counseling given       Referrals (Non-Medicare Wellness Visit Required):       Medical Equipment/Suppliers:           No exam data present  Reviewed Updated [facility] Prior Wellness Visits:   Last Medicare wellness visit information was reviewed, patient interviewed , no change since last AWVyes  Last Medicare wellness visit information was reviewed, patient interviewed and updates made to the record today yes    Assessment and Plan:  1  Type 2 diabetes mellitus without complication, without long-term current use of insulin (HCC)  Comprehensive metabolic panel    Hemoglobin A1c    Lipid panel    Microalbumin / creatinine urine ratio    TSH, 3rd generation    metFORMIN (GLUCOPHAGE) 500 mg tablet   2  Medicare annual wellness visit, subsequent     3  Screening for glaucoma     4  Need for hepatitis C screening test  Hepatitis C antibody   5  Screening for malignant neoplasm of breast     6  Screening for osteoporosis     7  Essential hypertension  amLODIPine (NORVASC) 5 mg tablet    atenolol (TENORMIN) 50 mg tablet    lisinopril (ZESTRIL) 20 mg tablet   8  Anxiety  chlordiazePOXIDE (LIBRIUM) 5 mg capsule    imipramine (TOFRANIL) 25 mg tablet   9   Mixed hyperlipidemia         Health Maintenance Due   Topic Date Due    Hepatitis C Screening  1945    DTaP,Tdap,and Td Vaccines (1 - Tdap) 10/08/1952    OPHTHALMOLOGY EXAM  10/08/1955    Depression Screening PHQ-9  10/08/1957    Fall Risk  10/08/2010    Urinary Incontinence Screening  10/08/2010    PNEUMOCOCCAL POLYSACCHARIDE VACCINE AGE 65 AND OVER  10/08/2010    GLAUCOMA SCREENING 67+ YR  10/08/2012    INFLUENZA VACCINE  09/01/2017

## 2018-02-13 NOTE — PROGRESS NOTES
Assessment/Plan:    1  DMII - check labs  Urged compliance with diet  Increase exercise as tolerated  Recheck 6m - earlier if bgs are elevated    2  HTN - controlled  Cont present meds  Check labs    3  Anxiety - meds refilled  Recheck as above    4  Hyperlipidemia - check labs as above    5  HM - AWV done  Check labs  Refuses mammo, colonoscopy, Dexa and all immunization recommendations  Pt understands risks  Recheck 6m  Subjective:      Patient ID: Landry Queen is a 67 y o  female  - pt states that she was cheating on her diet over the Winter but has been doing better over the past 2 weeks  Does not check home Bgs regularly  This morning bg was 154  Up to date with eye exams  Due for labs  - pt up to date with Cardio  No Cp, palp, lightheadedness or other CV symptoms  - no GI or  complaints  - refuses mammo, dexa, immunizations or other HM exams        The following portions of the patient's history were reviewed and updated as appropriate:   She  has a past medical history of Gastroenteritis  She  does not have any pertinent problems on file  She  has a past surgical history that includes Atrial cardiac pacemaker insertion  Her family history includes Dementia in her sister; Diabetes in her sister  She  reports that she has been smoking  She does not have any smokeless tobacco history on file  Her alcohol and drug histories are not on file    Current Outpatient Prescriptions   Medication Sig Dispense Refill    amLODIPine (NORVASC) 5 mg tablet Take 1 tablet (5 mg total) by mouth daily 90 tablet 3    ascorbic acid (VITAMIN C) 500 mg tablet Take by mouth daily      atenolol (TENORMIN) 50 mg tablet Take 1 tablet (50 mg total) by mouth daily 90 tablet 3    chlordiazePOXIDE (LIBRIUM) 5 mg capsule Take 1 capsule (5 mg total) by mouth 2 (two) times a day 180 capsule 3    Glucose Blood (ZHANG CONTOUR NEXT TEST VI) by In Vitro route      imipramine (TOFRANIL) 25 mg tablet Take 1 tablet (25 mg total) by mouth 2 (two) times a day 180 tablet 3    lisinopril (ZESTRIL) 20 mg tablet Take 1 tablet (20 mg total) by mouth 2 (two) times a day 90 tablet 3    metFORMIN (GLUCOPHAGE) 500 mg tablet Take 1 tablet (500 mg total) by mouth 2 (two) times a day with meals 180 tablet 3    Vitamin E 400 units TABS Take 2 tablets by mouth daily       No current facility-administered medications for this visit  No current outpatient prescriptions on file prior to visit  No current facility-administered medications on file prior to visit  She is allergic to atorvastatin; clarithromycin; hydrogen peroxide; levofloxacin; other; penicillins; and rosuvastatin       Review of Systems   Constitutional: Negative  HENT: Negative  Eyes: Negative  Respiratory: Negative  Cardiovascular: Negative  Gastrointestinal: Negative  Endocrine: Negative  Genitourinary: Negative  Musculoskeletal: Positive for arthralgias  Negative for back pain, gait problem, joint swelling, neck pain and neck stiffness  Skin: Negative  Allergic/Immunologic: Negative  Neurological: Negative  Hematological: Negative  Psychiatric/Behavioral: Positive for dysphoric mood  Negative for behavioral problems, confusion and sleep disturbance  The patient is not nervous/anxious and is not hyperactive  Objective:    Vitals:    02/13/18 1054   BP: 144/84   Pulse: 76   Temp: 98 7 °F (37 1 °C)        Physical Exam   Constitutional: She is oriented to person, place, and time  She appears well-developed and well-nourished  HENT:   Head: Normocephalic and atraumatic  Right Ear: External ear normal    Left Ear: External ear normal    Nose: Nose normal    Mouth/Throat: Oropharynx is clear and moist  No oropharyngeal exudate  Eyes: Conjunctivae and EOM are normal  Pupils are equal, round, and reactive to light  Neck: Normal range of motion  Neck supple  No JVD present  No thyromegaly present     Cardiovascular: Normal rate, regular rhythm and intact distal pulses  Exam reveals no gallop and no friction rub  Pulses are no weak pulses  No murmur heard  Pulses:       Dorsalis pedis pulses are 1+ on the right side, and 1+ on the left side  Pulmonary/Chest: Effort normal and breath sounds normal    Abdominal: Soft  She exhibits no distension  There is no tenderness  Musculoskeletal: Normal range of motion  Feet:   Right Foot:   Skin Integrity: Negative for ulcer, skin breakdown, erythema, warmth, callus or dry skin  Left Foot:   Skin Integrity: Negative for ulcer, skin breakdown, erythema, warmth, callus or dry skin  Lymphadenopathy:     She has no cervical adenopathy  Neurological: She is alert and oriented to person, place, and time  She has normal reflexes  She exhibits normal muscle tone  Coordination normal    Skin: Skin is warm and dry  She is not diaphoretic  Psychiatric: She has a normal mood and affect  Patient's shoes and socks removed  Right Foot/Ankle   Right Foot Inspection  Skin Exam: skin normal and skin intact no dry skin, no warmth, no callus, no erythema, no maceration, no abnormal color, no pre-ulcer, no ulcer and no callus                          Toe Exam: ROM and strength within normal limits  Sensory     Proprioception: intact   Monofilament testing: intact  Vascular  Capillary refills: < 3 seconds  The right DP pulse is 1+  Left Foot/Ankle  Left Foot Inspection  Skin Exam: skin normal and skin intactno dry skin, no warmth, no erythema, no maceration, normal color, no pre-ulcer, no ulcer and no callus                         Toe Exam: ROM and strength within normal limits                   Sensory     Proprioception: intact  Monofilament: intact  Vascular  Capillary refills: < 3 seconds  The left DP pulse is 1+  Assign Risk Category:  No deformity present; No loss of protective sensation;  No weak pulses       Risk: 0

## 2018-05-07 DIAGNOSIS — I10 ESSENTIAL HYPERTENSION: ICD-10-CM

## 2018-05-07 RX ORDER — ATENOLOL 50 MG/1
50 TABLET ORAL DAILY
Qty: 90 TABLET | Refills: 1 | Status: SHIPPED | OUTPATIENT
Start: 2018-05-07 | End: 2019-06-13 | Stop reason: SDUPTHER

## 2018-09-20 DIAGNOSIS — F41.9 ANXIETY: ICD-10-CM

## 2018-09-20 RX ORDER — CHLORDIAZEPOXIDE HYDROCHLORIDE 5 MG/1
CAPSULE, GELATIN COATED ORAL
Qty: 180 CAPSULE | Refills: 3 | Status: SHIPPED | OUTPATIENT
Start: 2018-09-20 | End: 2019-03-20 | Stop reason: SDUPTHER

## 2018-09-25 ENCOUNTER — OFFICE VISIT (OUTPATIENT)
Dept: FAMILY MEDICINE CLINIC | Facility: CLINIC | Age: 73
End: 2018-09-25
Payer: MEDICARE

## 2018-09-25 VITALS
BODY MASS INDEX: 27.29 KG/M2 | HEART RATE: 80 BPM | DIASTOLIC BLOOD PRESSURE: 78 MMHG | TEMPERATURE: 96.7 F | HEIGHT: 60 IN | SYSTOLIC BLOOD PRESSURE: 152 MMHG | WEIGHT: 139 LBS

## 2018-09-25 DIAGNOSIS — E11.9 TYPE 2 DIABETES MELLITUS WITHOUT COMPLICATION, WITHOUT LONG-TERM CURRENT USE OF INSULIN (HCC): Primary | ICD-10-CM

## 2018-09-25 DIAGNOSIS — F33.41 RECURRENT MAJOR DEPRESSIVE DISORDER, IN PARTIAL REMISSION (HCC): ICD-10-CM

## 2018-09-25 DIAGNOSIS — E78.2 MIXED HYPERLIPIDEMIA: ICD-10-CM

## 2018-09-25 DIAGNOSIS — F41.1 GENERALIZED ANXIETY DISORDER: ICD-10-CM

## 2018-09-25 DIAGNOSIS — I10 ESSENTIAL HYPERTENSION: ICD-10-CM

## 2018-09-25 PROCEDURE — 99214 OFFICE O/P EST MOD 30 MIN: CPT | Performed by: FAMILY MEDICINE

## 2018-09-25 NOTE — PROGRESS NOTES
Assessment/Plan:    Type 2 diabetes mellitus ([facility] )  Lab Results   Component Value Date    HGBA1C 7 3 (H) 05/12/2017       Pt overdue for labs and has been reluctant to have them done b/c of anxiety  Counseled pt  She agrees to go within the next week or so  Will also go for eye exam  Recheck 6m      Hypertension  SBP elevated  Home Bps are good (120s/70s) when she is calm - states that BP increases in doctors offices  Will cont present meds for now  Check labs  Recheck 6m    Anxiety disorder  suboptimal control but pt does not want to change or add meds  Will continue to monitor  Recheck 6m    Hyperlipidemia  Check labs    Recurrent major depressive disorder, in partial remission (HCC)  Mood a little sad due to loss of her friend but pt states that she was doing well prior to the loss  Cont present care  Recheck 6m    Health care maintenance  Pt refuses all screening tests as well as a flu shot  I reviewed risks with pt  Recheck 6m       Diagnoses and all orders for this visit:    Type 2 diabetes mellitus without complication, without long-term current use of insulin (HCC)  -     Hemoglobin A1C; Future  -     Comprehensive metabolic panel; Future  -     Lipid panel; Future  -     Microalbumin / creatinine urine ratio; Future    Heart block AV second degree    Essential hypertension  -     CBC and differential; Future    Mixed hyperlipidemia    Generalized anxiety disorder  -     TSH, 3rd generation; Future    Recurrent major depressive disorder, in partial remission ([facility] )    Other orders  -     Cancel: Mammo screening bilateral w cad; Future  -     Cancel: DXA bone density spine hip and pelvis; Future  -     Cancel: Ambulatory referral to Colorectal Surgery; Future          Subjective:      Patient ID: Michelle Zurita is a 67 y o  female  f/u multiple med issues  - feeling a little down lately since losing a good friend last week  Prior to that, she had been doing "OK"    Symptoms worse when "I am not occupied"  - pt overdue for labs  "I don't want to hear any bad news"  Does not check home Bgs regularly  This morning bg was 120-150  Overdue for eye exam  - pt up to date with Cardio  No Cp, palp, lightheadedness or other CV symptoms  Pacemaker working well  - no GI or  complaints other than some diarrhea from the metformin  - pt recently found to have a small area of skin cancer on her nose  Continues to f/u with derm  - refuses mammo, dexa, immunizations or other HM exams        The following portions of the patient's history were reviewed and updated as appropriate:   She  has a past medical history of Gastroenteritis  She   Patient Active Problem List    Diagnosis Date Noted    Health care maintenance 09/26/2018    Neck pain 06/06/2017    Recurrent major depressive disorder, in partial remission ([facility] ) 05/10/2017    Pacemaker 09/25/2015    Heart block AV second degree 09/16/2015    Elevated ALT measurement 06/17/2013    Elevated AST (SGOT) 06/17/2013    Hyperlipidemia 06/17/2013    Type 2 diabetes mellitus ([facility] ) 06/17/2013    Anxiety disorder 06/10/2013    Hypertension 09/27/2012     She  has a past surgical history that includes Atrial cardiac pacemaker insertion  She  reports that she has been smoking  She does not have any smokeless tobacco history on file  Her alcohol and drug histories are not on file    Current Outpatient Prescriptions   Medication Sig Dispense Refill    amLODIPine (NORVASC) 5 mg tablet Take 1 tablet (5 mg total) by mouth daily 90 tablet 3    ascorbic acid (VITAMIN C) 500 mg tablet Take by mouth daily      atenolol (TENORMIN) 50 mg tablet Take 1 tablet (50 mg total) by mouth daily 90 tablet 1    chlordiazePOXIDE (LIBRIUM) 5 mg capsule take 1 capsule by mouth twice a day 180 capsule 3    Glucose Blood (ZHANG CONTOUR NEXT TEST VI) by In Vitro route      imipramine (TOFRANIL) 25 mg tablet Take 1 tablet (25 mg total) by mouth 2 (two) times a day 180 tablet 3    lisinopril (ZESTRIL) 20 mg tablet Take 1 tablet (20 mg total) by mouth 2 (two) times a day 90 tablet 3    metFORMIN (GLUCOPHAGE) 500 mg tablet Take 1 tablet (500 mg total) by mouth 2 (two) times a day with meals 180 tablet 3    Vitamin E 400 units TABS Take 2 tablets by mouth daily       No current facility-administered medications for this visit  She is allergic to atorvastatin; clarithromycin; hydrogen peroxide; levofloxacin; other; penicillins; and rosuvastatin       Review of Systems   Constitutional: Negative  HENT: Negative  Eyes: Negative  Respiratory: Negative  Cardiovascular: Negative  Gastrointestinal: Positive for diarrhea  Negative for abdominal distention and abdominal pain  Endocrine: Negative  Genitourinary: Negative  Musculoskeletal: Negative  Skin:        Skin lesion as above   Allergic/Immunologic: Negative  Neurological: Negative  Hematological: Negative  Psychiatric/Behavioral: Negative for dysphoric mood, sleep disturbance and suicidal ideas  The patient is nervous/anxious  Objective:      /78   Pulse 80   Temp (!) 96 7 °F (35 9 °C)   Ht 5' (1 524 m)   Wt 63 kg (139 lb)   BMI 27 15 kg/m²          Physical Exam   Constitutional: She is oriented to person, place, and time  She appears well-developed and well-nourished  HENT:   Head: Normocephalic and atraumatic  Right Ear: External ear normal    Left Ear: External ear normal    Nose: Nose normal    Mouth/Throat: Oropharynx is clear and moist  No oropharyngeal exudate  Eyes: Conjunctivae and EOM are normal  Pupils are equal, round, and reactive to light  Neck: Normal range of motion  Neck supple  No JVD present  No thyromegaly present  Cardiovascular: Normal rate, regular rhythm, normal heart sounds and intact distal pulses  No murmur heard  Pulses:       Dorsalis pedis pulses are 1+ on the right side, and 1+ on the left side     Pulmonary/Chest: Effort normal and breath sounds normal    Abdominal: Soft  She exhibits no distension and no mass  There is no tenderness  Musculoskeletal: Normal range of motion  She exhibits deformity (mild diffuse OA changes)  Lymphadenopathy:     She has no cervical adenopathy  Neurological: She is alert and oriented to person, place, and time  She has normal reflexes  No cranial nerve deficit  She exhibits normal muscle tone  Coordination normal    Skin: She is not diaphoretic  Sebaceous cyst under SK on R parietal scalp   Psychiatric: Her behavior is normal  Judgment and thought content normal    Vitals reviewed

## 2018-09-26 PROBLEM — Z00.00 HEALTH CARE MAINTENANCE: Status: ACTIVE | Noted: 2018-09-26

## 2018-09-26 NOTE — ASSESSMENT & PLAN NOTE
Lab Results   Component Value Date    HGBA1C 7 3 (H) 05/12/2017       Pt overdue for labs and has been reluctant to have them done b/c of anxiety  Counseled pt  She agrees to go within the next week or so   Will also go for eye exam  Recheck 6m

## 2018-09-26 NOTE — ASSESSMENT & PLAN NOTE
Mood a little sad due to loss of her friend but pt states that she was doing well prior to the loss  Cont present care   Recheck 6m

## 2018-09-26 NOTE — ASSESSMENT & PLAN NOTE
SBP elevated  Home Bps are good (120s/70s) when she is calm - states that BP increases in doctors offices  Will cont present meds for now  Check labs   Recheck 6m

## 2018-09-26 NOTE — ASSESSMENT & PLAN NOTE
suboptimal control but pt does not want to change or add meds  Will continue to monitor   Recheck 6m

## 2018-12-04 DIAGNOSIS — E11.9 TYPE 2 DIABETES MELLITUS WITHOUT COMPLICATION, WITHOUT LONG-TERM CURRENT USE OF INSULIN (HCC): ICD-10-CM

## 2018-12-04 DIAGNOSIS — F41.9 ANXIETY: ICD-10-CM

## 2018-12-04 RX ORDER — IMIPRAMINE HCL 25 MG
25 TABLET ORAL 2 TIMES DAILY
Qty: 180 TABLET | Refills: 1 | Status: SHIPPED | OUTPATIENT
Start: 2018-12-04 | End: 2019-06-13 | Stop reason: SDUPTHER

## 2019-01-25 DIAGNOSIS — E11.9 TYPE 2 DIABETES MELLITUS WITHOUT COMPLICATION, WITHOUT LONG-TERM CURRENT USE OF INSULIN (HCC): ICD-10-CM

## 2019-01-25 DIAGNOSIS — I10 ESSENTIAL HYPERTENSION: ICD-10-CM

## 2019-01-25 RX ORDER — LISINOPRIL 20 MG/1
20 TABLET ORAL 2 TIMES DAILY
Qty: 180 TABLET | Refills: 1 | Status: SHIPPED | OUTPATIENT
Start: 2019-01-25 | End: 2019-06-13 | Stop reason: SDUPTHER

## 2019-03-20 DIAGNOSIS — F41.9 ANXIETY: ICD-10-CM

## 2019-03-21 RX ORDER — CHLORDIAZEPOXIDE HYDROCHLORIDE 5 MG/1
CAPSULE, GELATIN COATED ORAL
Qty: 180 CAPSULE | Refills: 3 | Status: SHIPPED | OUTPATIENT
Start: 2019-03-21 | End: 2019-12-11 | Stop reason: SDUPTHER

## 2019-03-22 ENCOUNTER — APPOINTMENT (OUTPATIENT)
Dept: LAB | Facility: CLINIC | Age: 74
End: 2019-03-22
Payer: MEDICARE

## 2019-03-22 DIAGNOSIS — E11.9 TYPE 2 DIABETES MELLITUS WITHOUT COMPLICATION, WITHOUT LONG-TERM CURRENT USE OF INSULIN (HCC): ICD-10-CM

## 2019-03-22 DIAGNOSIS — I10 ESSENTIAL HYPERTENSION: ICD-10-CM

## 2019-03-22 DIAGNOSIS — F41.1 GENERALIZED ANXIETY DISORDER: ICD-10-CM

## 2019-03-22 LAB
ALBUMIN SERPL BCP-MCNC: 3.9 G/DL (ref 3.5–5)
ALP SERPL-CCNC: 117 U/L (ref 46–116)
ALT SERPL W P-5'-P-CCNC: 36 U/L (ref 12–78)
ANION GAP SERPL CALCULATED.3IONS-SCNC: 3 MMOL/L (ref 4–13)
AST SERPL W P-5'-P-CCNC: 20 U/L (ref 5–45)
BASOPHILS # BLD AUTO: 0.06 THOUSANDS/ΜL (ref 0–0.1)
BASOPHILS NFR BLD AUTO: 1 % (ref 0–1)
BILIRUB SERPL-MCNC: 0.73 MG/DL (ref 0.2–1)
BUN SERPL-MCNC: 18 MG/DL (ref 5–25)
CALCIUM SERPL-MCNC: 9 MG/DL (ref 8.3–10.1)
CHLORIDE SERPL-SCNC: 103 MMOL/L (ref 100–108)
CHOLEST SERPL-MCNC: 248 MG/DL (ref 50–200)
CO2 SERPL-SCNC: 28 MMOL/L (ref 21–32)
CREAT SERPL-MCNC: 0.81 MG/DL (ref 0.6–1.3)
CREAT UR-MCNC: 28.2 MG/DL
EOSINOPHIL # BLD AUTO: 0.34 THOUSAND/ΜL (ref 0–0.61)
EOSINOPHIL NFR BLD AUTO: 3 % (ref 0–6)
ERYTHROCYTE [DISTWIDTH] IN BLOOD BY AUTOMATED COUNT: 12.5 % (ref 11.6–15.1)
EST. AVERAGE GLUCOSE BLD GHB EST-MCNC: 143 MG/DL
GFR SERPL CREATININE-BSD FRML MDRD: 72 ML/MIN/1.73SQ M
GLUCOSE P FAST SERPL-MCNC: 122 MG/DL (ref 65–99)
HBA1C MFR BLD: 6.6 % (ref 4.2–6.3)
HCT VFR BLD AUTO: 45.7 % (ref 34.8–46.1)
HCV AB SER QL: NORMAL
HDLC SERPL-MCNC: 29 MG/DL (ref 40–60)
HGB BLD-MCNC: 14.4 G/DL (ref 11.5–15.4)
IMM GRANULOCYTES # BLD AUTO: 0.04 THOUSAND/UL (ref 0–0.2)
IMM GRANULOCYTES NFR BLD AUTO: 0 % (ref 0–2)
LYMPHOCYTES # BLD AUTO: 2.47 THOUSANDS/ΜL (ref 0.6–4.47)
LYMPHOCYTES NFR BLD AUTO: 25 % (ref 14–44)
MCH RBC QN AUTO: 29.1 PG (ref 26.8–34.3)
MCHC RBC AUTO-ENTMCNC: 31.5 G/DL (ref 31.4–37.4)
MCV RBC AUTO: 92 FL (ref 82–98)
MICROALBUMIN UR-MCNC: <5 MG/L (ref 0–20)
MICROALBUMIN/CREAT 24H UR: <18 MG/G CREATININE (ref 0–30)
MONOCYTES # BLD AUTO: 0.64 THOUSAND/ΜL (ref 0.17–1.22)
MONOCYTES NFR BLD AUTO: 6 % (ref 4–12)
NEUTROPHILS # BLD AUTO: 6.42 THOUSANDS/ΜL (ref 1.85–7.62)
NEUTS SEG NFR BLD AUTO: 65 % (ref 43–75)
NONHDLC SERPL-MCNC: 219 MG/DL
NRBC BLD AUTO-RTO: 0 /100 WBCS
PLATELET # BLD AUTO: 278 THOUSANDS/UL (ref 149–390)
PMV BLD AUTO: 12.5 FL (ref 8.9–12.7)
POTASSIUM SERPL-SCNC: 4.2 MMOL/L (ref 3.5–5.3)
PROT SERPL-MCNC: 7.5 G/DL (ref 6.4–8.2)
RBC # BLD AUTO: 4.95 MILLION/UL (ref 3.81–5.12)
SODIUM SERPL-SCNC: 134 MMOL/L (ref 136–145)
TRIGL SERPL-MCNC: 491 MG/DL
TSH SERPL DL<=0.05 MIU/L-ACNC: 1.65 UIU/ML (ref 0.36–3.74)
WBC # BLD AUTO: 9.97 THOUSAND/UL (ref 4.31–10.16)

## 2019-03-22 PROCEDURE — 83036 HEMOGLOBIN GLYCOSYLATED A1C: CPT

## 2019-03-22 PROCEDURE — 36415 COLL VENOUS BLD VENIPUNCTURE: CPT

## 2019-03-22 PROCEDURE — 84443 ASSAY THYROID STIM HORMONE: CPT

## 2019-03-22 PROCEDURE — 82570 ASSAY OF URINE CREATININE: CPT

## 2019-03-22 PROCEDURE — 80053 COMPREHEN METABOLIC PANEL: CPT

## 2019-03-22 PROCEDURE — 80061 LIPID PANEL: CPT

## 2019-03-22 PROCEDURE — 85025 COMPLETE CBC W/AUTO DIFF WBC: CPT

## 2019-03-22 PROCEDURE — 86803 HEPATITIS C AB TEST: CPT | Performed by: FAMILY MEDICINE

## 2019-03-22 PROCEDURE — 82043 UR ALBUMIN QUANTITATIVE: CPT

## 2019-04-03 ENCOUNTER — OFFICE VISIT (OUTPATIENT)
Dept: FAMILY MEDICINE CLINIC | Facility: CLINIC | Age: 74
End: 2019-04-03
Payer: MEDICARE

## 2019-04-03 VITALS
SYSTOLIC BLOOD PRESSURE: 122 MMHG | HEIGHT: 60 IN | TEMPERATURE: 98.2 F | BODY MASS INDEX: 26.11 KG/M2 | HEART RATE: 72 BPM | DIASTOLIC BLOOD PRESSURE: 78 MMHG | WEIGHT: 133 LBS

## 2019-04-03 DIAGNOSIS — F33.41 RECURRENT MAJOR DEPRESSIVE DISORDER, IN PARTIAL REMISSION (HCC): ICD-10-CM

## 2019-04-03 DIAGNOSIS — E11.9 TYPE 2 DIABETES MELLITUS WITHOUT COMPLICATION, WITHOUT LONG-TERM CURRENT USE OF INSULIN (HCC): ICD-10-CM

## 2019-04-03 DIAGNOSIS — I10 ESSENTIAL HYPERTENSION: ICD-10-CM

## 2019-04-03 DIAGNOSIS — Z13.6 SCREENING FOR CARDIOVASCULAR CONDITION: ICD-10-CM

## 2019-04-03 DIAGNOSIS — Z00.00 MEDICARE ANNUAL WELLNESS VISIT, SUBSEQUENT: Primary | ICD-10-CM

## 2019-04-03 DIAGNOSIS — E78.2 MIXED HYPERLIPIDEMIA: ICD-10-CM

## 2019-04-03 PROCEDURE — 99214 OFFICE O/P EST MOD 30 MIN: CPT | Performed by: FAMILY MEDICINE

## 2019-04-03 PROCEDURE — G0439 PPPS, SUBSEQ VISIT: HCPCS | Performed by: FAMILY MEDICINE

## 2019-04-03 RX ORDER — BLOOD-GLUCOSE METER
EACH MISCELLANEOUS
Qty: 1 KIT | Refills: 1 | Status: SHIPPED | OUTPATIENT
Start: 2019-04-03 | End: 2021-06-16 | Stop reason: SDUPTHER

## 2019-04-15 ENCOUNTER — TELEPHONE (OUTPATIENT)
Dept: FAMILY MEDICINE CLINIC | Facility: CLINIC | Age: 74
End: 2019-04-15

## 2019-04-15 DIAGNOSIS — R63.4 ABNORMAL WEIGHT LOSS: ICD-10-CM

## 2019-04-15 DIAGNOSIS — E11.8 TYPE 2 DIABETES MELLITUS WITH COMPLICATION, WITHOUT LONG-TERM CURRENT USE OF INSULIN (HCC): ICD-10-CM

## 2019-04-15 DIAGNOSIS — R10.31 RLQ ABDOMINAL PAIN: Primary | ICD-10-CM

## 2019-04-16 ENCOUNTER — TELEPHONE (OUTPATIENT)
Dept: FAMILY MEDICINE CLINIC | Facility: CLINIC | Age: 74
End: 2019-04-16

## 2019-04-16 DIAGNOSIS — E11.8 TYPE 2 DIABETES MELLITUS WITH COMPLICATION, WITHOUT LONG-TERM CURRENT USE OF INSULIN (HCC): ICD-10-CM

## 2019-04-16 DIAGNOSIS — R63.4 ABNORMAL WEIGHT LOSS: ICD-10-CM

## 2019-04-23 ENCOUNTER — TELEPHONE (OUTPATIENT)
Dept: FAMILY MEDICINE CLINIC | Facility: CLINIC | Age: 74
End: 2019-04-23

## 2019-04-29 ENCOUNTER — TELEPHONE (OUTPATIENT)
Dept: FAMILY MEDICINE CLINIC | Facility: CLINIC | Age: 74
End: 2019-04-29

## 2019-04-30 DIAGNOSIS — E11.9 TYPE 2 DIABETES MELLITUS WITHOUT COMPLICATION, WITHOUT LONG-TERM CURRENT USE OF INSULIN (HCC): Primary | ICD-10-CM

## 2019-05-01 DIAGNOSIS — I10 ESSENTIAL HYPERTENSION: ICD-10-CM

## 2019-05-01 RX ORDER — AMLODIPINE BESYLATE 5 MG/1
TABLET ORAL
Qty: 90 TABLET | Refills: 3 | Status: SHIPPED | OUTPATIENT
Start: 2019-05-01 | End: 2020-03-12

## 2019-05-10 ENCOUNTER — TELEPHONE (OUTPATIENT)
Dept: FAMILY MEDICINE CLINIC | Facility: CLINIC | Age: 74
End: 2019-05-10

## 2019-06-13 DIAGNOSIS — F41.9 ANXIETY: ICD-10-CM

## 2019-06-13 DIAGNOSIS — I10 ESSENTIAL HYPERTENSION: ICD-10-CM

## 2019-06-13 RX ORDER — LISINOPRIL 20 MG/1
20 TABLET ORAL 2 TIMES DAILY
Qty: 180 TABLET | Refills: 1 | Status: SHIPPED | OUTPATIENT
Start: 2019-06-13 | End: 2019-12-11 | Stop reason: SDUPTHER

## 2019-06-13 RX ORDER — ATENOLOL 50 MG/1
50 TABLET ORAL DAILY
Qty: 90 TABLET | Refills: 1 | Status: SHIPPED | OUTPATIENT
Start: 2019-06-13 | End: 2019-11-06 | Stop reason: SDUPTHER

## 2019-06-13 RX ORDER — IMIPRAMINE HCL 25 MG
25 TABLET ORAL 2 TIMES DAILY
Qty: 180 TABLET | Refills: 1 | Status: SHIPPED | OUTPATIENT
Start: 2019-06-13 | End: 2019-11-27 | Stop reason: SDUPTHER

## 2019-08-27 ENCOUNTER — TELEPHONE (OUTPATIENT)
Dept: FAMILY MEDICINE CLINIC | Facility: CLINIC | Age: 74
End: 2019-08-27

## 2019-08-27 NOTE — TELEPHONE ENCOUNTER
Pt called because she noticed that her BP is running low a few times a month 115/58 and 110/65 and it seems when it is low she has a HA and a stiff neck she wanted to know if it was because she is on 3 BP medications ,also she is getting a strange feeling from her Hiatal hernia when she is drying off , bending over she gets a strange feeling but was unable to describe

## 2019-08-30 ENCOUNTER — OFFICE VISIT (OUTPATIENT)
Dept: FAMILY MEDICINE CLINIC | Facility: CLINIC | Age: 74
End: 2019-08-30
Payer: MEDICARE

## 2019-08-30 VITALS
BODY MASS INDEX: 27.17 KG/M2 | WEIGHT: 138.4 LBS | HEART RATE: 76 BPM | TEMPERATURE: 98 F | SYSTOLIC BLOOD PRESSURE: 140 MMHG | HEIGHT: 60 IN | DIASTOLIC BLOOD PRESSURE: 82 MMHG

## 2019-08-30 DIAGNOSIS — E11.9 TYPE 2 DIABETES MELLITUS WITHOUT COMPLICATION, WITHOUT LONG-TERM CURRENT USE OF INSULIN (HCC): ICD-10-CM

## 2019-08-30 DIAGNOSIS — I10 ESSENTIAL HYPERTENSION: Primary | ICD-10-CM

## 2019-08-30 LAB — SL AMB POCT HEMOGLOBIN AIC: 7 (ref ?–6.5)

## 2019-08-30 PROCEDURE — 99214 OFFICE O/P EST MOD 30 MIN: CPT | Performed by: FAMILY MEDICINE

## 2019-08-30 PROCEDURE — 83036 HEMOGLOBIN GLYCOSYLATED A1C: CPT | Performed by: FAMILY MEDICINE

## 2019-08-30 NOTE — PROGRESS NOTES
Assessment/Plan:    Type 2 diabetes mellitus ([facility] )  Lab Results   Component Value Date    HGBA1C 7 0 (A) 08/30/2019     I reviewed with pt  Cont present meds  Urged diet control and weight loss  BMI Counseling: Body mass index is 27 03 kg/m²  Discussed the patient's BMI with her  The BMI is above average  BMI counseling and education was provided to the patient  Nutrition recommendations include moderation in carbohydrate intake, increasing intake of lean protein, reducing intake of saturated fat and trans fat and reducing intake of cholesterol  Recheck 6m        Hypertension  I reviewed with pt  Pt without lightheadedness when her Bps are 110s/60-70  Cont present meds  Recheck 6 weeks       Diagnoses and all orders for this visit:    Essential hypertension  -     Comprehensive metabolic panel; Future  -     Lipid panel; Future    Type 2 diabetes mellitus without complication, without long-term current use of insulin (HCC)  -     POCT hemoglobin A1c  -     Comprehensive metabolic panel; Future  -     Lipid panel; Future          Subjective:      Patient ID: Marisabel Corona is a 68 y o  female  f/u multiple med issues  - pt has noticed occasional "head tightness"  Episodes are brief  Pt took her BP later  BP was 116/58  Had similar episode 1 week later  Bps otherwise have been well controlled  No CP, palp, lightheadedness or other CV symptoms  Pt without asymmetric numbness, weakness or other neuro symptoms during those episodes  - no GI or  issues  - has not been watching her diet  A1C = 7 0 today  No increased thirst or urination  - no other concerns      The following portions of the patient's history were reviewed and updated as appropriate: She  has a past medical history of Gastroenteritis    She   Patient Active Problem List    Diagnosis Date Noted    Health care maintenance 09/26/2018    Neck pain 06/06/2017    Recurrent major depressive disorder, in partial remission ([facility] ) 05/10/2017  Pacemaker 09/25/2015    Heart block AV second degree 09/16/2015    Elevated ALT measurement 06/17/2013    Elevated AST (SGOT) 06/17/2013    Hyperlipidemia 06/17/2013    Type 2 diabetes mellitus ([facility] [address] ) 06/17/2013    Anxiety disorder 06/10/2013    Hypertension 09/27/2012     She  has a past surgical history that includes Atrial cardiac pacemaker insertion  She  reports that she has quit smoking  She has never used smokeless tobacco  She reports that she drank alcohol  She reports that she does not use drugs  Current Outpatient Medications   Medication Sig Dispense Refill    amLODIPine (NORVASC) 5 mg tablet take 1 tablet by mouth once daily 90 tablet 3    ascorbic acid (VITAMIN C) 500 mg tablet Take by mouth daily      atenolol (TENORMIN) 50 mg tablet Take 1 tablet (50 mg total) by mouth daily 90 tablet 1    ZHANG MICROLET LANCETS lancets Use as instructed 100 each 5    Blood Glucose Monitoring Suppl (CONTOUR NEXT MONITOR) w/Device KIT As directed 1 kit 1    chlordiazePOXIDE (LIBRIUM) 5 mg capsule  TAKE 1 CAPSULE TWICE A  capsule 3    glucose blood (CONTOUR NEXT TEST) test strip Use as instructed 50 each 5    imipramine (TOFRANIL) 25 mg tablet Take 1 tablet (25 mg total) by mouth 2 (two) times a day 180 tablet 1    lisinopril (ZESTRIL) 20 mg tablet Take 1 tablet (20 mg total) by mouth 2 (two) times a day 180 tablet 1    metFORMIN (GLUCOPHAGE) 500 mg tablet Take 1 tablet (500 mg total) by mouth 2 (two) times a day with meals 180 tablet 1    Vitamin E 400 units TABS Take 2 tablets by mouth daily       No current facility-administered medications for this visit  She is allergic to atorvastatin; clarithromycin; hydrogen peroxide; levofloxacin; other; penicillins; and rosuvastatin       Review of Systems   Constitutional: Negative  HENT: Negative  Eyes: Negative  Respiratory: Negative  Cardiovascular: Negative  Gastrointestinal: Negative    Negative for abdominal distention, abdominal pain and diarrhea  Endocrine: Negative  Genitourinary: Negative  Musculoskeletal: Negative  Allergic/Immunologic: Negative  Neurological: Positive for headaches  Negative for dizziness, weakness, light-headedness and numbness  Hematological: Negative  Psychiatric/Behavioral: Negative for dysphoric mood, sleep disturbance and suicidal ideas  The patient is nervous/anxious (mild, persistent)  Objective:      /82 (BP Location: Right arm, Patient Position: Sitting, Cuff Size: Standard)   Pulse 76   Temp 98 °F (36 7 °C)   Ht 5' (1 524 m)   Wt 62 8 kg (138 lb 6 4 oz)   BMI 27 03 kg/m²          Physical Exam   Constitutional: She is oriented to person, place, and time  She appears well-developed and well-nourished  HENT:   Head: Normocephalic and atraumatic  Right Ear: External ear normal    Left Ear: External ear normal    Nose: Nose normal    Mouth/Throat: Oropharynx is clear and moist  No oropharyngeal exudate  Eyes: Pupils are equal, round, and reactive to light  Conjunctivae and EOM are normal    Neck: Normal range of motion  Neck supple  No JVD present  No thyromegaly present  Cardiovascular: Normal rate, regular rhythm, normal heart sounds and intact distal pulses  No murmur heard  Pulses:       Dorsalis pedis pulses are 1+ on the right side, and 1+ on the left side  Pulmonary/Chest: Effort normal and breath sounds normal    Abdominal: Soft  Bowel sounds are normal  She exhibits no distension and no mass  There is no tenderness  There is no rebound and no guarding  Musculoskeletal: Normal range of motion  She exhibits deformity (mild diffuse OA changes)  Feet:   Right Foot:   Skin Integrity: Negative for ulcer, skin breakdown, erythema, warmth, callus or dry skin  Left Foot:   Skin Integrity: Negative for ulcer, skin breakdown, erythema, warmth, callus or dry skin  Lymphadenopathy:     She has no cervical adenopathy     Neurological: She is alert and oriented to person, place, and time  She has normal reflexes  No cranial nerve deficit  She exhibits normal muscle tone  Coordination normal    Skin: Skin is warm  She is not diaphoretic  Vitals reviewed

## 2019-09-02 NOTE — ASSESSMENT & PLAN NOTE
Lab Results   Component Value Date    HGBA1C 7 0 (A) 08/30/2019     I reviewed with pt  Cont present meds  Urged diet control and weight loss  BMI Counseling: Body mass index is 27 03 kg/m²  Discussed the patient's BMI with her  The BMI is above average  BMI counseling and education was provided to the patient  Nutrition recommendations include moderation in carbohydrate intake, increasing intake of lean protein, reducing intake of saturated fat and trans fat and reducing intake of cholesterol     Recheck 6m

## 2019-09-02 NOTE — ASSESSMENT & PLAN NOTE
I reviewed with pt  Pt without lightheadedness when her Bps are 110s/60-70  Cont present meds   Recheck 6 weeks

## 2019-11-06 DIAGNOSIS — I10 ESSENTIAL HYPERTENSION: ICD-10-CM

## 2019-11-06 RX ORDER — ATENOLOL 50 MG/1
50 TABLET ORAL DAILY
Qty: 90 TABLET | Refills: 3 | Status: SHIPPED | OUTPATIENT
Start: 2019-11-06 | End: 2019-12-11 | Stop reason: SDUPTHER

## 2019-11-14 ENCOUNTER — TELEPHONE (OUTPATIENT)
Dept: FAMILY MEDICINE CLINIC | Facility: CLINIC | Age: 74
End: 2019-11-14

## 2019-11-14 NOTE — TELEPHONE ENCOUNTER
Patient states that she is having chills but no fever  She just had a biopsy done on her nose and didn't want to come out today  Only wants to see you

## 2019-11-14 NOTE — TELEPHONE ENCOUNTER
Patient states her R side of glands have been hurting  It hurts when she touches it and she feels pressure when she bends over  Is asking if antibiotic could be called in?       [facility]

## 2019-11-25 ENCOUNTER — OFFICE VISIT (OUTPATIENT)
Dept: FAMILY MEDICINE CLINIC | Facility: CLINIC | Age: 74
End: 2019-11-25
Payer: MEDICARE

## 2019-11-25 VITALS
BODY MASS INDEX: 27.84 KG/M2 | HEART RATE: 85 BPM | WEIGHT: 141.8 LBS | HEIGHT: 60 IN | RESPIRATION RATE: 18 BRPM | SYSTOLIC BLOOD PRESSURE: 126 MMHG | DIASTOLIC BLOOD PRESSURE: 80 MMHG | OXYGEN SATURATION: 98 %

## 2019-11-25 DIAGNOSIS — J01.10 ACUTE NON-RECURRENT FRONTAL SINUSITIS: Primary | ICD-10-CM

## 2019-11-25 PROCEDURE — 99213 OFFICE O/P EST LOW 20 MIN: CPT | Performed by: FAMILY MEDICINE

## 2019-11-25 RX ORDER — AZITHROMYCIN 250 MG/1
TABLET, FILM COATED ORAL
Qty: 6 TABLET | Refills: 0 | Status: SHIPPED | OUTPATIENT
Start: 2019-11-25 | End: 2019-11-30

## 2019-11-25 NOTE — PROGRESS NOTES
Usman Perez 1945 female MRN: [id number]    Acute Visit    Assessment/Plan     Ayaka Shook was seen today for sore throat  Diagnoses and all orders for this visit:    Acute non-recurrent frontal sinusitis  -     azithromycin (ZITHROMAX) 250 mg tablet; Take 2 tablets (500 mg total) by mouth every 24 hours for 1 day, THEN 1 tablet (250 mg total) every 24 hours for 4 days  patient allergic to PEN, tolerated azithro in the past      Juan R Murrell MD  [address]  11/25/2019      Please be aware that this note contains text that was dictated and there may be errors pertaining to "sound-alike "words during the dictation process  SUBJECTIVE    CC: Sore Throat (Sore throat)    HPI:  Usman Perez is a 76 y o  female who presented for an acute visit complaining of 10 days symptoms  Started with sore throat, tender LN in neck, chills  Waxed and waned symptoms  Now also associated sinus congestion, rhinorrhea  Denies fever, diarrhea, rash  Review of Systems   Constitutional: Positive for chills and fatigue  Negative for appetite change and fever  HENT: Positive for congestion, rhinorrhea, sinus pressure and sore throat  Respiratory: Negative for cough and shortness of breath  Cardiovascular: Negative for chest pain  Gastrointestinal: Negative for diarrhea and nausea  Musculoskeletal: Negative for myalgias  Skin: Negative for rash  All other systems reviewed and are negative        Medications:   Meds/Allergies   Current Outpatient Medications   Medication Sig Dispense Refill    amLODIPine (NORVASC) 5 mg tablet take 1 tablet by mouth once daily 90 tablet 3    ascorbic acid (VITAMIN C) 500 mg tablet Take by mouth daily      atenolol (TENORMIN) 50 mg tablet Take 1 tablet (50 mg total) by mouth daily 90 tablet 3    ZHANG MICROLET LANCETS lancets Use as instructed 100 each 5    Blood Glucose Monitoring Suppl (CONTOUR NEXT MONITOR) w/Device KIT As directed 1 kit 1    chlordiazePOXIDE (LIBRIUM) 5 mg capsule  TAKE 1 CAPSULE TWICE A  capsule 3    glucose blood (CONTOUR NEXT TEST) test strip Use as instructed 50 each 5    imipramine (TOFRANIL) 25 mg tablet Take 1 tablet (25 mg total) by mouth 2 (two) times a day 180 tablet 1    lisinopril (ZESTRIL) 20 mg tablet Take 1 tablet (20 mg total) by mouth 2 (two) times a day 180 tablet 1    metFORMIN (GLUCOPHAGE) 500 mg tablet Take 1 tablet (500 mg total) by mouth 2 (two) times a day with meals 180 tablet 1    Vitamin E 400 units TABS Take 2 tablets by mouth daily      azithromycin (ZITHROMAX) 250 mg tablet Take 2 tablets (500 mg total) by mouth every 24 hours for 1 day, THEN 1 tablet (250 mg total) every 24 hours for 4 days  6 tablet 0     No current facility-administered medications for this visit  Allergies   Allergen Reactions    Atorvastatin      Reaction Date: [id number];     Clarithromycin      Reaction Date: 23Dec2011;     Hydrogen Peroxide      Reaction Date: 23Dec2011;     Levofloxacin     Other      Perioxol (mouth rinse)    Penicillins     Rosuvastatin      Reaction Date: 23Dec2011;      OBJECTIVE    Vitals:   Vitals:    11/25/19 0928   BP: 126/80   BP Location: Right arm   Patient Position: Sitting   Cuff Size: Standard   Pulse: 85   Resp: 18   SpO2: 98%   Weight: 64 3 kg (141 lb 12 8 oz)   Height: 5' (1 524 m)     Physical Exam   Constitutional: Vital signs are normal  She appears well-developed and well-nourished  She does not appear ill  No distress  HENT:   Head: Normocephalic and atraumatic  Right Ear: External ear normal  Tympanic membrane is not erythematous  No middle ear effusion  Left Ear: External ear normal  Tympanic membrane is not erythematous  A middle ear effusion is present  Nose: Rhinorrhea present  Right sinus exhibits maxillary sinus tenderness  Left sinus exhibits maxillary sinus tenderness     Mouth/Throat: Uvula is midline, oropharynx is clear and moist and mucous membranes are normal  No oropharyngeal exudate or posterior oropharyngeal erythema  No tonsillar exudate  Eyes: Conjunctivae, EOM and lids are normal    Neck: No JVD present  No tracheal deviation present  Cardiovascular: Normal rate, regular rhythm and intact distal pulses  Pulmonary/Chest: Effort normal  No accessory muscle usage or stridor  No respiratory distress  She has no rhonchi  She has rales in the right lower field  Negative egophony    Abdominal: Normal appearance  Lymphadenopathy:     She has cervical adenopathy  Neurological: She is alert  Skin: Capillary refill takes less than 2 seconds  No rash noted  She is not diaphoretic  Psychiatric: She has a normal mood and affect  Her speech is normal and behavior is normal  She expresses no suicidal ideation  Nursing note and vitals reviewed

## 2019-11-27 DIAGNOSIS — F41.9 ANXIETY: ICD-10-CM

## 2019-11-27 RX ORDER — IMIPRAMINE HCL 25 MG
25 TABLET ORAL 2 TIMES DAILY
Qty: 180 TABLET | Refills: 1 | Status: SHIPPED | OUTPATIENT
Start: 2019-11-27 | End: 2019-12-11 | Stop reason: SDUPTHER

## 2019-12-03 ENCOUNTER — OFFICE VISIT (OUTPATIENT)
Dept: FAMILY MEDICINE CLINIC | Facility: CLINIC | Age: 74
End: 2019-12-03
Payer: MEDICARE

## 2019-12-03 VITALS
TEMPERATURE: 98.2 F | HEART RATE: 78 BPM | BODY MASS INDEX: 27.68 KG/M2 | DIASTOLIC BLOOD PRESSURE: 82 MMHG | HEIGHT: 60 IN | SYSTOLIC BLOOD PRESSURE: 122 MMHG | WEIGHT: 141 LBS

## 2019-12-03 DIAGNOSIS — K21.9 GASTROESOPHAGEAL REFLUX DISEASE, ESOPHAGITIS PRESENCE NOT SPECIFIED: Primary | ICD-10-CM

## 2019-12-03 DIAGNOSIS — B37.0 THRUSH: ICD-10-CM

## 2019-12-03 DIAGNOSIS — L30.9 ECZEMA, UNSPECIFIED TYPE: ICD-10-CM

## 2019-12-03 DIAGNOSIS — B37.2 CANDIDAL DERMATITIS: ICD-10-CM

## 2019-12-03 PROCEDURE — 99214 OFFICE O/P EST MOD 30 MIN: CPT | Performed by: FAMILY MEDICINE

## 2019-12-03 RX ORDER — PANTOPRAZOLE SODIUM 20 MG/1
20 TABLET, DELAYED RELEASE ORAL
Qty: 30 TABLET | Refills: 0 | Status: SHIPPED | OUTPATIENT
Start: 2019-12-03 | End: 2019-12-26 | Stop reason: SDUPTHER

## 2019-12-03 RX ORDER — TRIAMCINOLONE ACETONIDE 1 MG/G
CREAM TOPICAL 2 TIMES DAILY
Qty: 30 G | Refills: 0 | Status: SHIPPED | OUTPATIENT
Start: 2019-12-03 | End: 2020-08-25 | Stop reason: SDUPTHER

## 2019-12-03 NOTE — PROGRESS NOTES
Assessment/Plan:     Diagnoses and all orders for this visit:    Gastroesophageal reflux disease, esophagitis presence not specified  Comments:  start pantoprazole 20mg qd  Avoid hot food/other irritants  Recheck 1 week if not resolving  Orders:  -     pantoprazole (PROTONIX) 20 mg tablet; Take 1 tablet (20 mg total) by mouth daily before breakfast  -     triamcinolone (KENALOG) 0 1 % cream; Apply topically 2 (two) times a day    Eczema, unspecified type  Comments:  moisturize  TAC 0 1% prn as directed  Recheck 2-4 weeks if not resolving    Candidal dermatitis  Comments:  Improving  Keep area dry  Cont baby powder  Recheck 2-4 weeks if not improving - earlier if worse    Thrush  Comments:  resolving  Finish clotrimazole  Recheck prn          Subjective:      Patient ID: Romana Dubois is a 76 y o  female  Here for eval of several issues  - pt has a hx of recurrent, dry, pruritic dermatitis  Comes and goes  Improved with moisturizer  No new expsoures  -1-2m hx of L inframammary rash  Improved with powder/keeping area dry  No skin breakdown  Pt does not typically wear a bra  - pt developed thrush after taking zithro  Pt presently on clotrimazole troches and is improving    - recently developed low GERD symptoms  Would get discomfort in low chest/upper epigastrium with eating hot food  No change in BMs  No N/V  Improved with cold food  No change in weight  - Uri/sinus symptoms are better but has not resolved  The following portions of the patient's history were reviewed and updated as appropriate:   She  has a past medical history of Gastroenteritis    She   Patient Active Problem List    Diagnosis Date Noted    Health care maintenance 09/26/2018    Neck pain 06/06/2017    [facility], [facility] 05/10/2017    Pacemaker 09/25/2015    Heart block AV second degree 09/16/2015    Elevated ALT measurement 06/17/2013    Elevated AST (SGOT) 06/17/2013    Hyperlipidemia 06/17/2013    Type 2 diabetes mellitus ([facility] [address] ) 06/17/2013    Anxiety disorder 06/10/2013    Hypertension 09/27/2012     She  has a past surgical history that includes Atrial cardiac pacemaker insertion  She  reports that she has quit smoking  She has never used smokeless tobacco  She reports that she drank alcohol  She reports that she does not use drugs  Current Outpatient Medications   Medication Sig Dispense Refill    amLODIPine (NORVASC) 5 mg tablet take 1 tablet by mouth once daily 90 tablet 3    ascorbic acid (VITAMIN C) 500 mg tablet Take by mouth daily      atenolol (TENORMIN) 50 mg tablet Take 1 tablet (50 mg total) by mouth daily 90 tablet 3    ZHANG MICROLET LANCETS lancets Use as instructed 100 each 5    Blood Glucose Monitoring Suppl (CONTOUR NEXT MONITOR) w/Device KIT As directed 1 kit 1    chlordiazePOXIDE (LIBRIUM) 5 mg capsule  TAKE 1 CAPSULE TWICE A  capsule 3    glucose blood (CONTOUR NEXT TEST) test strip Use as instructed 50 each 5    imipramine (TOFRANIL) 25 mg tablet Take 1 tablet (25 mg total) by mouth 2 (two) times a day 180 tablet 1    lisinopril (ZESTRIL) 20 mg tablet Take 1 tablet (20 mg total) by mouth 2 (two) times a day 180 tablet 1    metFORMIN (GLUCOPHAGE) 500 mg tablet Take 1 tablet (500 mg total) by mouth 2 (two) times a day with meals 180 tablet 1    pantoprazole (PROTONIX) 20 mg tablet Take 1 tablet (20 mg total) by mouth daily before breakfast 30 tablet 0    triamcinolone (KENALOG) 0 1 % cream Apply topically 2 (two) times a day 30 g 0    Vitamin E 400 units TABS Take 2 tablets by mouth daily       No current facility-administered medications for this visit  She is allergic to atorvastatin; clarithromycin; hydrogen peroxide; levofloxacin; other; penicillins; and rosuvastatin       Review of Systems   Constitutional: Negative  HENT: Positive for congestion and mouth sores (thrush - improved)   Negative for sinus pressure, sinus pain and sore throat  Eyes: Negative  Respiratory: Positive for cough  Negative for shortness of breath  Cardiovascular: Negative  Gastrointestinal: Negative for nausea and vomiting  Epigastric area discomfort   Genitourinary: Negative  Skin: Positive for rash  Neurological: Negative for weakness, numbness and headaches  Objective:      /82 (BP Location: Right arm, Patient Position: Sitting, Cuff Size: Standard)   Pulse 78   Temp 98 2 °F (36 8 °C)   Ht 5' (1 524 m)   Wt 64 kg (141 lb)   BMI 27 54 kg/m²          Physical Exam   Constitutional: She is oriented to person, place, and time  She appears well-developed and well-nourished  HENT:   Head: Normocephalic and atraumatic  Right Ear: External ear normal    Left Ear: External ear normal    Mouth/Throat: Oropharynx is clear and moist    No thrush noted  Sinuses NT  Sl nasal congestion   Eyes: Pupils are equal, round, and reactive to light  Conjunctivae and EOM are normal    Neck: Normal range of motion  Neck supple  Cardiovascular: Normal rate, regular rhythm, normal heart sounds and intact distal pulses  Pulmonary/Chest: Effort normal  She has rales (chronic rales in R base  No wheeze or dullness to percussion)  Abdominal: Soft  Bowel sounds are normal  She exhibits no distension  There is no tenderness  Lymphadenopathy:     She has no cervical adenopathy  Neurological: She is alert and oriented to person, place, and time  Skin: Rash (resolving L inframmary crease dermatitis  No satellite lesions  L upper chest with eczematous lesion without skin breakdown) noted

## 2019-12-11 DIAGNOSIS — I10 ESSENTIAL HYPERTENSION: ICD-10-CM

## 2019-12-11 DIAGNOSIS — F41.9 ANXIETY: ICD-10-CM

## 2019-12-11 DIAGNOSIS — E11.9 TYPE 2 DIABETES MELLITUS WITHOUT COMPLICATION, WITHOUT LONG-TERM CURRENT USE OF INSULIN (HCC): ICD-10-CM

## 2019-12-11 RX ORDER — CHLORDIAZEPOXIDE HYDROCHLORIDE 5 MG/1
CAPSULE, GELATIN COATED ORAL
Qty: 180 CAPSULE | Refills: 0 | Status: SHIPPED | OUTPATIENT
Start: 2019-12-11 | End: 2020-03-12

## 2019-12-11 RX ORDER — IMIPRAMINE HCL 25 MG
25 TABLET ORAL 2 TIMES DAILY
Qty: 180 TABLET | Refills: 3 | Status: SHIPPED | OUTPATIENT
Start: 2019-12-11 | End: 2021-01-04

## 2019-12-11 RX ORDER — ATENOLOL 50 MG/1
50 TABLET ORAL DAILY
Qty: 90 TABLET | Refills: 3 | Status: SHIPPED | OUTPATIENT
Start: 2019-12-11 | End: 2020-11-27 | Stop reason: SDUPTHER

## 2019-12-11 RX ORDER — LISINOPRIL 20 MG/1
20 TABLET ORAL 2 TIMES DAILY
Qty: 180 TABLET | Refills: 3 | Status: SHIPPED | OUTPATIENT
Start: 2019-12-11 | End: 2021-01-05 | Stop reason: SDUPTHER

## 2019-12-26 DIAGNOSIS — K21.9 GASTROESOPHAGEAL REFLUX DISEASE, ESOPHAGITIS PRESENCE NOT SPECIFIED: ICD-10-CM

## 2019-12-26 RX ORDER — PANTOPRAZOLE SODIUM 20 MG/1
TABLET, DELAYED RELEASE ORAL
Qty: 30 TABLET | Refills: 0 | Status: SHIPPED | OUTPATIENT
Start: 2019-12-26 | End: 2020-01-30

## 2020-01-14 ENCOUNTER — OFFICE VISIT (OUTPATIENT)
Dept: FAMILY MEDICINE CLINIC | Facility: CLINIC | Age: 75
End: 2020-01-14
Payer: MEDICARE

## 2020-01-14 VITALS
DIASTOLIC BLOOD PRESSURE: 72 MMHG | HEART RATE: 74 BPM | TEMPERATURE: 98.2 F | BODY MASS INDEX: 27.88 KG/M2 | RESPIRATION RATE: 16 BRPM | WEIGHT: 142 LBS | SYSTOLIC BLOOD PRESSURE: 124 MMHG | HEIGHT: 60 IN

## 2020-01-14 DIAGNOSIS — J06.9 ACUTE URI: Primary | ICD-10-CM

## 2020-01-14 PROCEDURE — 99213 OFFICE O/P EST LOW 20 MIN: CPT | Performed by: FAMILY MEDICINE

## 2020-01-14 RX ORDER — DEXTROMETHORPHAN HYDROBROMIDE AND PROMETHAZINE HYDROCHLORIDE 15; 6.25 MG/5ML; MG/5ML
5 SOLUTION ORAL 4 TIMES DAILY PRN
Qty: 150 ML | Refills: 0 | Status: SHIPPED | OUTPATIENT
Start: 2020-01-14 | End: 2020-04-17 | Stop reason: ALTCHOICE

## 2020-01-14 NOTE — PROGRESS NOTES
Assessment/Plan:      Diagnoses and all orders for this visit:    Acute URI  Comments:  start prometh dm for cough  Cont other conservative measures  Recheck Friday if not improved - earlier if worse  Orders:  -     Promethazine-DM (PHENERGAN-DM) 6 25-15 mg/5 mL oral syrup; Take 5 mL by mouth 4 (four) times a day as needed for cough    Other orders  -     Meloxicam 7 5 MG TBDP; Take by mouth daily          Subjective:     Patient ID: Arabella Coleman is a 76 y o  female  3 day hx of nasal congestion, chills, cough and fatigue  Cough in occasionally productive  No bodyaches, worsening SOB or other symptoms  Review of Systems   Constitutional: Positive for chills and fatigue  Negative for fever  HENT: Positive for congestion and sore throat  Negative for ear pain, sinus pressure and sinus pain  Eyes: Negative  Respiratory: Positive for cough  Negative for shortness of breath and wheezing  Cardiovascular: Negative  Objective:  Vitals:    01/14/20 1005   BP: 124/72   Pulse: 74   Resp: 16   Temp: 98 2 °F (36 8 °C)        Physical Exam   Constitutional: She appears well-developed  HENT:   Head: Normocephalic and atraumatic  Right Ear: External ear normal    Left Ear: External ear normal    Mouth/Throat: Oropharynx is clear and moist    Eyes: Pupils are equal, round, and reactive to light  Conjunctivae and EOM are normal    Neck: Normal range of motion  Neck supple  Cardiovascular: Normal rate and regular rhythm  No murmur heard  Pulmonary/Chest: Effort normal and breath sounds normal  She has no wheezes  Lymphadenopathy:     She has no cervical adenopathy  Skin: Skin is warm

## 2020-01-16 ENCOUNTER — TELEPHONE (OUTPATIENT)
Dept: FAMILY MEDICINE CLINIC | Facility: CLINIC | Age: 75
End: 2020-01-16

## 2020-01-16 DIAGNOSIS — J01.90 ACUTE SINUSITIS, RECURRENCE NOT SPECIFIED, UNSPECIFIED LOCATION: Primary | ICD-10-CM

## 2020-01-16 RX ORDER — AZITHROMYCIN 250 MG/1
TABLET, FILM COATED ORAL
Qty: 6 TABLET | Refills: 0 | Status: SHIPPED | OUTPATIENT
Start: 2020-01-16 | End: 2020-01-20

## 2020-01-16 NOTE — TELEPHONE ENCOUNTER
Patient called stating she was seen on 1/14/2020 and was told if she wasn't feeling better to call into the office to get an antibiotic called in  She states she normally gets a 5 day antibiotic called in?      [address] to check with pharmacy

## 2020-01-16 NOTE — TELEPHONE ENCOUNTER
She is worried about getting trush again with the antibiotics   Can this happen again?    Please advise

## 2020-01-29 DIAGNOSIS — K21.9 GASTROESOPHAGEAL REFLUX DISEASE, ESOPHAGITIS PRESENCE NOT SPECIFIED: ICD-10-CM

## 2020-01-30 RX ORDER — PANTOPRAZOLE SODIUM 20 MG/1
TABLET, DELAYED RELEASE ORAL
Qty: 30 TABLET | Refills: 0 | Status: SHIPPED | OUTPATIENT
Start: 2020-01-30 | End: 2020-03-06

## 2020-02-20 ENCOUNTER — TELEPHONE (OUTPATIENT)
Dept: FAMILY MEDICINE CLINIC | Facility: CLINIC | Age: 75
End: 2020-02-20

## 2020-02-20 ENCOUNTER — OFFICE VISIT (OUTPATIENT)
Dept: FAMILY MEDICINE CLINIC | Facility: CLINIC | Age: 75
End: 2020-02-20
Payer: MEDICARE

## 2020-02-20 VITALS
DIASTOLIC BLOOD PRESSURE: 68 MMHG | HEART RATE: 74 BPM | HEIGHT: 60 IN | SYSTOLIC BLOOD PRESSURE: 124 MMHG | TEMPERATURE: 98.1 F | BODY MASS INDEX: 27.84 KG/M2 | WEIGHT: 141.8 LBS

## 2020-02-20 DIAGNOSIS — C44.311 BASAL CELL CARCINOMA (BCC) OF RIGHT SIDE OF NOSE: ICD-10-CM

## 2020-02-20 DIAGNOSIS — Z01.818 PREOP EXAMINATION: Primary | ICD-10-CM

## 2020-02-20 DIAGNOSIS — E11.9 TYPE 2 DIABETES MELLITUS WITHOUT COMPLICATION, WITHOUT LONG-TERM CURRENT USE OF INSULIN (HCC): ICD-10-CM

## 2020-02-20 DIAGNOSIS — I10 ESSENTIAL HYPERTENSION: ICD-10-CM

## 2020-02-20 DIAGNOSIS — E78.2 MIXED HYPERLIPIDEMIA: ICD-10-CM

## 2020-02-20 LAB — SL AMB POCT HEMOGLOBIN AIC: 9 (ref ?–6.5)

## 2020-02-20 PROCEDURE — 3074F SYST BP LT 130 MM HG: CPT | Performed by: FAMILY MEDICINE

## 2020-02-20 PROCEDURE — 3046F HEMOGLOBIN A1C LEVEL >9.0%: CPT | Performed by: FAMILY MEDICINE

## 2020-02-20 PROCEDURE — 83036 HEMOGLOBIN GLYCOSYLATED A1C: CPT | Performed by: FAMILY MEDICINE

## 2020-02-20 PROCEDURE — 93000 ELECTROCARDIOGRAM COMPLETE: CPT | Performed by: FAMILY MEDICINE

## 2020-02-20 PROCEDURE — 99214 OFFICE O/P EST MOD 30 MIN: CPT | Performed by: FAMILY MEDICINE

## 2020-02-20 PROCEDURE — 1160F RVW MEDS BY RX/DR IN RCRD: CPT | Performed by: FAMILY MEDICINE

## 2020-02-20 PROCEDURE — 3078F DIAST BP <80 MM HG: CPT | Performed by: FAMILY MEDICINE

## 2020-02-20 PROCEDURE — 1036F TOBACCO NON-USER: CPT | Performed by: FAMILY MEDICINE

## 2020-02-20 NOTE — TELEPHONE ENCOUNTER
She has about 1 week left of 500 mg Metformin, she will need the 1000 mg called to [facility]  Since you increased it

## 2020-02-20 NOTE — PROGRESS NOTES
Patient ID: Kat Santiago is a 76 y o  female  HPI: 76 y  o female is being seen for a preoperative visit excision of R nose skin cancer (Dr Josh Razo) and closure (Dr Carlitos Traore)    Surgical Risk Assessment:    Prior anesthesia: Adverse Reaction to : Epidural n    General n   Spinal n  Family history of adverse reactions to anesthesia? Pertinent Past Medical History:  HTN, DMII, depression, s/p Pacer (2nd deg heart block)    Exercise Capacity:     Able to walk 4 blocks w/o Sx       y       Able to walk 2 flights of steps w/o Sx   y    Lifestyle Factors: Tobacco Use:  no        Pack years 45+ pk yr (quit 5 years ago)  Alcohol Use:  n  Illicit Drug Use:  n     ADA Risk Factors: none    Personal history of venous thromboembolic disease:    History of Steroid use for >2 weeks within last year? Family History   Problem Relation Age of Onset    Diabetes Sister     Dementia Sister      Social History     Socioeconomic History    Marital status:       Spouse name: Not on file    Number of children: Not on file    Years of education: Not on file    Highest education level: Not on file   Occupational History    Not on file   Social Needs    Financial resource strain: Not on file    Food insecurity:     Worry: Not on file     Inability: Not on file    Transportation needs:     Medical: Not on file     Non-medical: Not on file   Tobacco Use    Smoking status: Former Smoker    Smokeless tobacco: Never Used   Substance and Sexual Activity    Alcohol use: Not Currently     Frequency: Never    Drug use: Never    Sexual activity: Not on file   Lifestyle    Physical activity:     Days per week: Not on file     Minutes per session: Not on file    Stress: Not on file   Relationships    Social connections:     Talks on phone: Not on file     Gets together: Not on file     Attends [employer] service: Not on file     Active member of club or organization: Not on file     Attends meetings of clubs or organizations: Not on file     Relationship status: Not on file    Intimate partner violence:     Fear of current or ex partner: Not on file     Emotionally abused: Not on file     Physically abused: Not on file     Forced sexual activity: Not on file   Other Topics Concern    Not on file   Social History Narrative    Not on file     Past Medical History:   Diagnosis Date    Gastroenteritis      Past Surgical History:   Procedure Laterality Date    ATRIAL CARDIAC PACEMAKER INSERTION       Allergies   Allergen Reactions    Atorvastatin      Reaction Date: 23Dec2011; Unknown reaction    Clarithromycin      Reaction Date: 23Dec2011;   Patient does not recall reaction    Hydrogen Peroxide Swelling     Reaction Date: 23Dec2011;   Swelling in the mouth    Levofloxacin      Patient does not recall her reaction    Other Swelling     Perioxol (mouth rinse)  Perioxol (mouth rinse)    Penicillins Hives and Swelling    Rosuvastatin      Reaction Date: 23Dec2011;    Unknown reaction per patient       Current Outpatient Medications:     amLODIPine (NORVASC) 5 mg tablet, take 1 tablet by mouth once daily, Disp: 90 tablet, Rfl: 3    ascorbic acid (VITAMIN C) 500 mg tablet, Take by mouth daily, Disp: , Rfl:     atenolol (TENORMIN) 50 mg tablet, Take 1 tablet (50 mg total) by mouth daily, Disp: 90 tablet, Rfl: 3    [place] MICROLET LANCETS lancets, Use as instructed, Disp: 100 each, Rfl: 5    Blood Glucose Monitoring Suppl (CONTOUR NEXT MONITOR) w/Device KIT, As directed, Disp: 1 kit, Rfl: 1    chlordiazePOXIDE (LIBRIUM) 5 mg capsule, take 1 capsule by mouth twice a day, Disp: 180 capsule, Rfl: 0    glucose blood (CONTOUR NEXT TEST) test strip, Use as instructed, Disp: 50 each, Rfl: 5    imipramine (TOFRANIL) 25 mg tablet, Take 1 tablet (25 mg total) by mouth 2 (two) times a day, Disp: 180 tablet, Rfl: 3    lisinopril (ZESTRIL) 20 mg tablet, Take 1 tablet (20 mg total) by mouth 2 (two) times a day, Disp: 180 tablet, Rfl: 3    Meloxicam 7 5 MG TBDP, Take by mouth daily, Disp: , Rfl:     metFORMIN (GLUCOPHAGE) 1000 MG tablet, Take 1 tablet (1,000 mg total) by mouth 2 (two) times a day with meals, Disp: 180 tablet, Rfl: 1    pantoprazole (PROTONIX) 20 mg tablet, take 1 tablet by mouth once daily before breakfast, Disp: 30 tablet, Rfl: 0    Promethazine-DM (PHENERGAN-DM) 6 25-15 mg/5 mL oral syrup, Take 5 mL by mouth 4 (four) times a day as needed for cough, Disp: 150 mL, Rfl: 0    triamcinolone (KENALOG) 0 1 % cream, Apply topically 2 (two) times a day, Disp: 30 g, Rfl: 0    Vitamin E 400 units TABS, Take 2 tablets by mouth daily, Disp: , Rfl:      Review of Systems    Consitutional:  Denies, chills, fatigue, fever and malaise   ENT:  Denies, blurry vision, double vision, eye pain, nasal discharge, nasal congestion and sore throat   Pulmonary:  Denies cough, shortness of breath or dyspnea on exertion and No cough, shortness of breath, dyspnea on exertion    Cardiovascular:  Denies chest pain/pressure   Abdomen:   Denies abdominal pain, nausea, vomiting, diarrhea, constipation    Genitourinary:  no urinary symptoms   Hematology/Lymphatics:   Denies blood clots, bruising, jaundice, night sweats  Musculoskeletal:  Denies gait disturbance, myalgia, arthalgia or muscle weakness and No gait disturbance, myalgia,  arthalgia or muscle weakness    Integumentary:  Denies ecchymosis, petechiae, rash or lesions   Neurological:  Denies headaches, dizziness, confusion, loss of consciousness or behavioral changes  Psychological:  anxiety or deprssion      OBJECTIVE    /68   Pulse 74   Temp 98 1 °F (36 7 °C)   Ht 5' (1 524 m)   Wt 64 3 kg (141 lb 12 8 oz)   BMI 27 69 kg/m²     Constitutional:  Well appearing and in no acute distress  ENT:  ENT exam normal, no neck nodes or sinus tenderness   Pulmonary:  clear to auscultation bilaterally  Cardiovascular:  S1S2, regular rate and rhythm  Gastrointestinal:  abdomen is soft without significant tenderness  Lymphatic:  no lymphadenopathy cervical  Musculoskeletal:  no joint tenderness, deformity or swelling  Skin:  warm, no rashes, no ecchymosis  Neurologic:  Alert and oriented x 4      DATA:  Laboratory Results:     Lab Results   Component Value Date    ALT 36 03/22/2019    AST 20 03/22/2019    BUN 18 03/22/2019    CALCIUM 9 0 03/22/2019     03/22/2019    CHOL 257 (H) 09/22/2016    CO2 28 03/22/2019    CREATININE 0 81 03/22/2019    HDL 29 (L) 03/22/2019    HCT 45 7 03/22/2019    HGB 14 4 03/22/2019    HGBA1C 9 0 (A) 02/20/2020     03/22/2019    K 4 2 03/22/2019     09/22/2016    TRIG 491 (H) 03/22/2019    WBC 9 97 03/22/2019     ECG: NSR with L axis deviation and widened QRS in pt with DDD pacer      Patient Clearance:Risk Estimation: per the Revised Cardiac Risk Index (Circ  [id number], 1999): the patient's risk factors for cardiac complications include:   RCI Risk Class: 1    Current medications which may produce withdrawal symptoms if withheld perioperatively:    Pre-op Evaluation Plan  1  Further preoperative work-up as follows: monitor BGs carefully post op   2  Medication Management/Recommendations: n/a  3  Prophylaxis for cardiac events with perioperative beta-blockers: n/a    Clearance:  Patient is CLEARED for surgery without any additional cardiac testing      Assessment/Plan:  Diagnoses and all orders for this visit:    Preop examination  -     POCT ECG    Basal cell carcinoma (BCC) of right side of nose  -     POCT ECG    Type 2 diabetes mellitus without complication, without long-term current use of insulin (HCC)  -     POCT ECG  -     POCT hemoglobin A1c    Essential hypertension  -     POCT ECG    Mixed hyperlipidemia    Discussion: Poor control due to noncompliance  I reviewed with pt  Discussed the need to maintain good blood sugar control given her upcoming surgery  Discussed risk for postop infection sugars do high    Increase metformin to 1000 mg b  i  d     Patient work on diet  She refuses any new medications at present  Recheck labs in 3 months

## 2020-02-21 ENCOUNTER — TELEPHONE (OUTPATIENT)
Dept: FAMILY MEDICINE CLINIC | Facility: CLINIC | Age: 75
End: 2020-02-21

## 2020-02-21 DIAGNOSIS — E11.9 TYPE 2 DIABETES MELLITUS WITHOUT COMPLICATION, WITHOUT LONG-TERM CURRENT USE OF INSULIN (HCC): ICD-10-CM

## 2020-02-21 NOTE — TELEPHONE ENCOUNTER
It may take a little while to get things better  She needs to stay on her diet  I could call in a new med for her, which would help - is she willing to try?

## 2020-02-21 NOTE — TELEPHONE ENCOUNTER
Sugar ck this morning was 208   She said she doubled up on her meds like you advised yesterday    Please advise

## 2020-02-23 NOTE — ASSESSMENT & PLAN NOTE
Lab Results   Component Value Date    HGBA1C 9 0 (A) 02/20/2020     Poor control due to noncompliance  I reviewed with pt  Discussed the need to maintain good blood sugar control given her upcoming surgery  Discussed risk for postop infection sugars do high  Increase metformin to 1000 mg b i d   Patient work on diet  She refuses any new medications at present  Recheck labs in 3 months

## 2020-03-06 DIAGNOSIS — K21.9 GASTROESOPHAGEAL REFLUX DISEASE, ESOPHAGITIS PRESENCE NOT SPECIFIED: ICD-10-CM

## 2020-03-06 RX ORDER — PANTOPRAZOLE SODIUM 20 MG/1
TABLET, DELAYED RELEASE ORAL
Qty: 30 TABLET | Refills: 0 | Status: SHIPPED | OUTPATIENT
Start: 2020-03-06 | End: 2020-05-18 | Stop reason: SDUPTHER

## 2020-03-10 ENCOUNTER — TELEPHONE (OUTPATIENT)
Dept: FAMILY MEDICINE CLINIC | Facility: CLINIC | Age: 75
End: 2020-03-10

## 2020-03-10 NOTE — TELEPHONE ENCOUNTER
She has really been watching her diet,  She took Metformin 1000 mg for 2 days and it gave her diarrhea,  She went back on 500 mg and she still has diarrhea and her sugar last week was 177 and yesterday 224 and 221,  Pl adv

## 2020-03-11 DIAGNOSIS — E11.9 TYPE 2 DIABETES MELLITUS WITHOUT COMPLICATION, WITHOUT LONG-TERM CURRENT USE OF INSULIN (HCC): Primary | ICD-10-CM

## 2020-03-11 RX ORDER — REPAGLINIDE 0.5 MG/1
0.5 TABLET ORAL
Qty: 90 TABLET | Refills: 3 | Status: SHIPPED | OUTPATIENT
Start: 2020-03-11 | End: 2020-03-16 | Stop reason: ALTCHOICE

## 2020-03-11 NOTE — TELEPHONE ENCOUNTER
I called in repaglinide (Prandin) 0 5mg  She needs to take 1 tab 5min before each meal  Call in 1 week with blood sugars   Cont low dose metformin with this new med

## 2020-03-12 DIAGNOSIS — F41.9 ANXIETY: ICD-10-CM

## 2020-03-12 DIAGNOSIS — I10 ESSENTIAL HYPERTENSION: ICD-10-CM

## 2020-03-12 RX ORDER — CHLORDIAZEPOXIDE HYDROCHLORIDE 5 MG/1
CAPSULE, GELATIN COATED ORAL
Qty: 180 CAPSULE | Refills: 0 | Status: SHIPPED | OUTPATIENT
Start: 2020-03-12 | End: 2020-05-28

## 2020-03-12 RX ORDER — AMLODIPINE BESYLATE 5 MG/1
TABLET ORAL
Qty: 90 TABLET | Refills: 3 | Status: SHIPPED | OUTPATIENT
Start: 2020-03-12 | End: 2021-02-08 | Stop reason: SDUPTHER

## 2020-03-16 DIAGNOSIS — E11.9 TYPE 2 DIABETES MELLITUS WITHOUT COMPLICATION, WITHOUT LONG-TERM CURRENT USE OF INSULIN (HCC): ICD-10-CM

## 2020-03-16 NOTE — TELEPHONE ENCOUNTER
She stopped Repaglinide today, it giving her diarrhea, feels nauseous, arms and legs feel like rubber  Her sugars are still 202, 204    She is starting Metformin 1000 mg BID,  Both meds cause diarrhea    Will needs this sent to [facility]

## 2020-03-19 ENCOUNTER — TELEPHONE (OUTPATIENT)
Dept: FAMILY MEDICINE CLINIC | Facility: CLINIC | Age: 75
End: 2020-03-19

## 2020-03-19 NOTE — TELEPHONE ENCOUNTER
Patient called     She started taking Metformin 1000 bid this week and watching her diet  Her glucose levels are steadily going down     03/16 ,   03/17 ,   03/18 ,   03/19

## 2020-04-16 ENCOUNTER — TELEPHONE (OUTPATIENT)
Dept: FAMILY MEDICINE CLINIC | Facility: CLINIC | Age: 75
End: 2020-04-16

## 2020-04-17 ENCOUNTER — OFFICE VISIT (OUTPATIENT)
Dept: FAMILY MEDICINE CLINIC | Facility: CLINIC | Age: 75
End: 2020-04-17
Payer: MEDICARE

## 2020-04-17 VITALS
SYSTOLIC BLOOD PRESSURE: 132 MMHG | HEIGHT: 60 IN | RESPIRATION RATE: 16 BRPM | WEIGHT: 143 LBS | HEART RATE: 72 BPM | TEMPERATURE: 98.4 F | DIASTOLIC BLOOD PRESSURE: 78 MMHG | BODY MASS INDEX: 28.07 KG/M2

## 2020-04-17 DIAGNOSIS — H93.A1 PULSATILE TINNITUS OF RIGHT EAR: Primary | ICD-10-CM

## 2020-04-17 PROCEDURE — 1160F RVW MEDS BY RX/DR IN RCRD: CPT | Performed by: FAMILY MEDICINE

## 2020-04-17 PROCEDURE — 99213 OFFICE O/P EST LOW 20 MIN: CPT | Performed by: FAMILY MEDICINE

## 2020-04-17 PROCEDURE — 3075F SYST BP GE 130 - 139MM HG: CPT | Performed by: FAMILY MEDICINE

## 2020-04-17 PROCEDURE — 3008F BODY MASS INDEX DOCD: CPT | Performed by: FAMILY MEDICINE

## 2020-04-17 PROCEDURE — 3046F HEMOGLOBIN A1C LEVEL >9.0%: CPT | Performed by: FAMILY MEDICINE

## 2020-04-17 PROCEDURE — 1036F TOBACCO NON-USER: CPT | Performed by: FAMILY MEDICINE

## 2020-04-17 PROCEDURE — 3078F DIAST BP <80 MM HG: CPT | Performed by: FAMILY MEDICINE

## 2020-04-17 RX ORDER — FLUTICASONE PROPIONATE 50 MCG
2 SPRAY, SUSPENSION (ML) NASAL DAILY
Qty: 1 BOTTLE | Refills: 2 | Status: SHIPPED | OUTPATIENT
Start: 2020-04-17

## 2020-04-28 ENCOUNTER — TELEPHONE (OUTPATIENT)
Dept: FAMILY MEDICINE CLINIC | Facility: CLINIC | Age: 75
End: 2020-04-28

## 2020-04-28 ENCOUNTER — TELEMEDICINE (OUTPATIENT)
Dept: FAMILY MEDICINE CLINIC | Facility: CLINIC | Age: 75
End: 2020-04-28
Payer: MEDICARE

## 2020-04-28 DIAGNOSIS — H93.A1 PULSATILE TINNITUS OF RIGHT EAR: Primary | ICD-10-CM

## 2020-04-28 PROCEDURE — 99441 PR PHYS/QHP TELEPHONE EVALUATION 5-10 MIN: CPT | Performed by: FAMILY MEDICINE

## 2020-04-29 ENCOUNTER — TELEPHONE (OUTPATIENT)
Dept: FAMILY MEDICINE CLINIC | Facility: CLINIC | Age: 75
End: 2020-04-29

## 2020-05-18 DIAGNOSIS — K21.9 GASTROESOPHAGEAL REFLUX DISEASE, ESOPHAGITIS PRESENCE NOT SPECIFIED: Primary | ICD-10-CM

## 2020-05-18 RX ORDER — PANTOPRAZOLE SODIUM 20 MG/1
TABLET, DELAYED RELEASE ORAL
Qty: 30 TABLET | Refills: 2 | Status: SHIPPED | OUTPATIENT
Start: 2020-05-18 | End: 2021-10-21 | Stop reason: SDUPTHER

## 2020-05-28 DIAGNOSIS — F41.9 ANXIETY: ICD-10-CM

## 2020-05-28 RX ORDER — CHLORDIAZEPOXIDE HYDROCHLORIDE 5 MG/1
CAPSULE, GELATIN COATED ORAL
Qty: 180 CAPSULE | Refills: 0 | Status: SHIPPED | OUTPATIENT
Start: 2020-05-28 | End: 2020-08-25 | Stop reason: SDUPTHER

## 2020-06-12 ENCOUNTER — TELEPHONE (OUTPATIENT)
Dept: FAMILY MEDICINE CLINIC | Facility: CLINIC | Age: 75
End: 2020-06-12

## 2020-06-15 ENCOUNTER — OFFICE VISIT (OUTPATIENT)
Dept: FAMILY MEDICINE CLINIC | Facility: CLINIC | Age: 75
End: 2020-06-15
Payer: MEDICARE

## 2020-06-15 VITALS
SYSTOLIC BLOOD PRESSURE: 120 MMHG | HEIGHT: 60 IN | WEIGHT: 140 LBS | TEMPERATURE: 98.4 F | BODY MASS INDEX: 27.48 KG/M2 | DIASTOLIC BLOOD PRESSURE: 76 MMHG | HEART RATE: 76 BPM

## 2020-06-15 DIAGNOSIS — I65.21 CAROTID STENOSIS, RIGHT: ICD-10-CM

## 2020-06-15 DIAGNOSIS — E11.9 TYPE 2 DIABETES MELLITUS WITHOUT COMPLICATION, WITHOUT LONG-TERM CURRENT USE OF INSULIN (HCC): Primary | ICD-10-CM

## 2020-06-15 DIAGNOSIS — I44.2 HEART BLOCK AV COMPLETE (HCC): ICD-10-CM

## 2020-06-15 DIAGNOSIS — I10 ESSENTIAL HYPERTENSION: ICD-10-CM

## 2020-06-15 DIAGNOSIS — F33.41 RECURRENT MAJOR DEPRESSIVE DISORDER, IN PARTIAL REMISSION (HCC): ICD-10-CM

## 2020-06-15 LAB — SL AMB POCT HEMOGLOBIN AIC: 8.3 (ref ?–6.5)

## 2020-06-15 PROCEDURE — 3074F SYST BP LT 130 MM HG: CPT | Performed by: FAMILY MEDICINE

## 2020-06-15 PROCEDURE — 3052F HG A1C>EQUAL 8.0%<EQUAL 9.0%: CPT | Performed by: FAMILY MEDICINE

## 2020-06-15 PROCEDURE — 3008F BODY MASS INDEX DOCD: CPT | Performed by: FAMILY MEDICINE

## 2020-06-15 PROCEDURE — 1160F RVW MEDS BY RX/DR IN RCRD: CPT | Performed by: FAMILY MEDICINE

## 2020-06-15 PROCEDURE — 99214 OFFICE O/P EST MOD 30 MIN: CPT | Performed by: FAMILY MEDICINE

## 2020-06-15 PROCEDURE — 83036 HEMOGLOBIN GLYCOSYLATED A1C: CPT | Performed by: FAMILY MEDICINE

## 2020-06-15 PROCEDURE — 3078F DIAST BP <80 MM HG: CPT | Performed by: FAMILY MEDICINE

## 2020-06-15 PROCEDURE — 1036F TOBACCO NON-USER: CPT | Performed by: FAMILY MEDICINE

## 2020-06-15 RX ORDER — REPAGLINIDE 0.5 MG/1
0.5 TABLET ORAL
Qty: 90 TABLET | Refills: 3 | Status: SHIPPED | OUTPATIENT
Start: 2020-06-15 | End: 2020-07-10

## 2020-06-15 RX ORDER — ACETAMINOPHEN/DIPHENHYDRAMINE 500MG-25MG
81 TABLET ORAL DAILY
COMMUNITY
Start: 2020-06-05 | End: 2020-08-25 | Stop reason: SDUPTHER

## 2020-06-15 RX ORDER — REPAGLINIDE 0.5 MG/1
0.5 TABLET ORAL
Qty: 90 TABLET | Refills: 3 | Status: SHIPPED | OUTPATIENT
Start: 2020-06-15 | End: 2020-06-15

## 2020-06-16 PROBLEM — I65.21 CAROTID STENOSIS, RIGHT: Status: ACTIVE | Noted: 2020-06-16

## 2020-06-29 ENCOUNTER — TELEPHONE (OUTPATIENT)
Dept: FAMILY MEDICINE CLINIC | Facility: CLINIC | Age: 75
End: 2020-06-29

## 2020-07-09 DIAGNOSIS — E11.9 TYPE 2 DIABETES MELLITUS WITHOUT COMPLICATION, WITHOUT LONG-TERM CURRENT USE OF INSULIN (HCC): ICD-10-CM

## 2020-07-10 RX ORDER — REPAGLINIDE 0.5 MG/1
TABLET ORAL
Qty: 360 TABLET | Refills: 1 | Status: SHIPPED | OUTPATIENT
Start: 2020-07-10 | End: 2021-06-10

## 2020-07-22 ENCOUNTER — TELEPHONE (OUTPATIENT)
Dept: FAMILY MEDICINE CLINIC | Facility: CLINIC | Age: 75
End: 2020-07-22

## 2020-07-22 NOTE — TELEPHONE ENCOUNTER
Patient called  Ever since she came home from surgery her legs have been restless  She finds that she is kicking all around at night  Patient wants to know what would be causing this  The only new medication she is on is a blood thinner  What does she do about this  She cannot come in  Refused appt

## 2020-07-24 DIAGNOSIS — G25.81 RESTLESS LEG: Primary | ICD-10-CM

## 2020-07-24 RX ORDER — PRAMIPEXOLE DIHYDROCHLORIDE 0.12 MG/1
TABLET ORAL
Qty: 60 TABLET | Refills: 1 | Status: SHIPPED | OUTPATIENT
Start: 2020-07-24 | End: 2020-10-01

## 2020-07-24 NOTE — TELEPHONE ENCOUNTER
Spoke with Pt last night she had a good night sleep ,she is not sure she wants to take another pill ,she will see how she does tonight and if she decides she will get them tomorrow

## 2020-07-24 NOTE — TELEPHONE ENCOUNTER
I called in Mirapex 0 125mg to be taken 1 tab 1-2 hours before bed  Watch for fatigue or lightheadedness  If not improved in 3-4 days, she can take 2 tabs   Pt to call in 1-2 weeks with follow up

## 2020-08-25 DIAGNOSIS — K21.9 GASTROESOPHAGEAL REFLUX DISEASE, ESOPHAGITIS PRESENCE NOT SPECIFIED: ICD-10-CM

## 2020-08-25 DIAGNOSIS — I44.1 HEART BLOCK AV SECOND DEGREE: Primary | ICD-10-CM

## 2020-08-25 DIAGNOSIS — F41.9 ANXIETY: ICD-10-CM

## 2020-08-25 RX ORDER — TRIAMCINOLONE ACETONIDE 1 MG/G
CREAM TOPICAL 2 TIMES DAILY
Qty: 30 G | Refills: 0 | Status: SHIPPED | OUTPATIENT
Start: 2020-08-25 | End: 2020-11-17 | Stop reason: SDUPTHER

## 2020-08-25 RX ORDER — CHLORDIAZEPOXIDE HYDROCHLORIDE 5 MG/1
5 CAPSULE, GELATIN COATED ORAL 2 TIMES DAILY
Qty: 180 CAPSULE | Refills: 0 | Status: SHIPPED | OUTPATIENT
Start: 2020-08-25 | End: 2020-12-04

## 2020-08-25 RX ORDER — ACETAMINOPHEN/DIPHENHYDRAMINE 500MG-25MG
81 TABLET ORAL DAILY
Qty: 90 TABLET | Refills: 0 | Status: SHIPPED | OUTPATIENT
Start: 2020-08-25 | End: 2020-11-19

## 2020-10-01 DIAGNOSIS — G25.81 RESTLESS LEG: ICD-10-CM

## 2020-10-01 RX ORDER — PRAMIPEXOLE DIHYDROCHLORIDE 0.12 MG/1
TABLET ORAL
Qty: 60 TABLET | Refills: 1 | Status: SHIPPED | OUTPATIENT
Start: 2020-10-01 | End: 2021-01-05 | Stop reason: SDUPTHER

## 2020-11-17 DIAGNOSIS — K21.9 GASTROESOPHAGEAL REFLUX DISEASE: ICD-10-CM

## 2020-11-17 DIAGNOSIS — L30.9 ECZEMA, UNSPECIFIED TYPE: Primary | ICD-10-CM

## 2020-11-17 RX ORDER — TRIAMCINOLONE ACETONIDE 1 MG/G
CREAM TOPICAL 2 TIMES DAILY
Qty: 30 G | Refills: 0 | Status: SHIPPED | OUTPATIENT
Start: 2020-11-17

## 2020-11-19 DIAGNOSIS — I44.1 HEART BLOCK AV SECOND DEGREE: ICD-10-CM

## 2020-11-19 RX ORDER — ACETAMINOPHEN/DIPHENHYDRAMINE 500MG-25MG
TABLET ORAL
Qty: 90 TABLET | Refills: 0 | Status: SHIPPED | OUTPATIENT
Start: 2020-11-19

## 2020-11-27 DIAGNOSIS — E11.9 TYPE 2 DIABETES MELLITUS WITHOUT COMPLICATION, WITHOUT LONG-TERM CURRENT USE OF INSULIN (HCC): ICD-10-CM

## 2020-11-27 DIAGNOSIS — I10 ESSENTIAL HYPERTENSION: ICD-10-CM

## 2020-11-27 RX ORDER — ATENOLOL 50 MG/1
50 TABLET ORAL DAILY
Qty: 90 TABLET | Refills: 0 | Status: SHIPPED | OUTPATIENT
Start: 2020-11-27 | End: 2021-01-05 | Stop reason: SDUPTHER

## 2020-12-04 DIAGNOSIS — F41.9 ANXIETY: ICD-10-CM

## 2020-12-04 RX ORDER — CHLORDIAZEPOXIDE HYDROCHLORIDE 5 MG/1
CAPSULE, GELATIN COATED ORAL
Qty: 180 CAPSULE | Refills: 0 | Status: SHIPPED | OUTPATIENT
Start: 2020-12-04 | End: 2021-03-02 | Stop reason: SDUPTHER

## 2020-12-30 ENCOUNTER — TELEPHONE (OUTPATIENT)
Dept: FAMILY MEDICINE CLINIC | Facility: CLINIC | Age: 75
End: 2020-12-30

## 2020-12-30 DIAGNOSIS — Z20.822 EXPOSURE TO COVID-19 VIRUS: Primary | ICD-10-CM

## 2021-01-02 DIAGNOSIS — Z20.822 EXPOSURE TO COVID-19 VIRUS: ICD-10-CM

## 2021-01-02 PROCEDURE — U0003 INFECTIOUS AGENT DETECTION BY NUCLEIC ACID (DNA OR RNA); SEVERE ACUTE RESPIRATORY SYNDROME CORONAVIRUS 2 (SARS-COV-2) (CORONAVIRUS DISEASE [COVID-19]), AMPLIFIED PROBE TECHNIQUE, MAKING USE OF HIGH THROUGHPUT TECHNOLOGIES AS DESCRIBED BY CMS-2020-01-R: HCPCS | Performed by: NURSE PRACTITIONER

## 2021-01-04 DIAGNOSIS — F41.9 ANXIETY: ICD-10-CM

## 2021-01-04 DIAGNOSIS — E11.9 TYPE 2 DIABETES MELLITUS WITHOUT COMPLICATION, WITHOUT LONG-TERM CURRENT USE OF INSULIN (HCC): ICD-10-CM

## 2021-01-04 LAB — SARS-COV-2 RNA SPEC QL NAA+PROBE: NOT DETECTED

## 2021-01-04 RX ORDER — IMIPRAMINE HCL 25 MG
TABLET ORAL
Qty: 180 TABLET | Refills: 3 | Status: SHIPPED | OUTPATIENT
Start: 2021-01-04 | End: 2021-10-21 | Stop reason: SDUPTHER

## 2021-01-05 DIAGNOSIS — E11.9 TYPE 2 DIABETES MELLITUS WITHOUT COMPLICATION, WITHOUT LONG-TERM CURRENT USE OF INSULIN (HCC): ICD-10-CM

## 2021-01-05 DIAGNOSIS — G25.81 RESTLESS LEG: ICD-10-CM

## 2021-01-05 DIAGNOSIS — I10 ESSENTIAL HYPERTENSION: ICD-10-CM

## 2021-01-06 RX ORDER — ATENOLOL 50 MG/1
50 TABLET ORAL DAILY
Qty: 90 TABLET | Refills: 0 | Status: SHIPPED | OUTPATIENT
Start: 2021-01-06 | End: 2021-05-06 | Stop reason: SDUPTHER

## 2021-01-06 RX ORDER — LISINOPRIL 20 MG/1
20 TABLET ORAL 2 TIMES DAILY
Qty: 180 TABLET | Refills: 3 | Status: SHIPPED | OUTPATIENT
Start: 2021-01-06 | End: 2021-06-10 | Stop reason: ALTCHOICE

## 2021-01-06 RX ORDER — PRAMIPEXOLE DIHYDROCHLORIDE 0.12 MG/1
TABLET ORAL
Qty: 180 TABLET | Refills: 0 | Status: SHIPPED | OUTPATIENT
Start: 2021-01-06 | End: 2021-03-02 | Stop reason: SDUPTHER

## 2021-02-08 DIAGNOSIS — I10 ESSENTIAL HYPERTENSION: ICD-10-CM

## 2021-02-08 DIAGNOSIS — F41.9 ANXIETY: ICD-10-CM

## 2021-02-08 NOTE — TELEPHONE ENCOUNTER
Patient requested refill on generic librium  Explained she was almost a month early   She states she only has 2 5 weeks left of RX      12/07/2020 1 12/04/2020   CHLORDIAZEPOXIDE 5 MG CAPSULE  180 0 90 CH POG [id number] [facility] (0314) 0  Comm Ins [place]    09/05/2020 1 08/25/2020   CHLORDIAZEPOXIDE 5 MG CAPSULE  180 0 90 CH POG [id number] [facility] (3154) 0  Comm Ins [place]    06/09/2020 1 05/28/2020   CHLORDIAZEPOXIDE 5 MG CAPSULE  180 0 90 CH POG [id number] [facility] (3154) 0  Comm Ins [place]

## 2021-02-09 DIAGNOSIS — Z23 ENCOUNTER FOR IMMUNIZATION: ICD-10-CM

## 2021-02-09 RX ORDER — AMLODIPINE BESYLATE 5 MG/1
5 TABLET ORAL DAILY
Qty: 90 TABLET | Refills: 3 | Status: SHIPPED | OUTPATIENT
Start: 2021-02-09 | End: 2021-10-21 | Stop reason: SDUPTHER

## 2021-02-09 RX ORDER — CHLORDIAZEPOXIDE HYDROCHLORIDE 5 MG/1
5 CAPSULE, GELATIN COATED ORAL 2 TIMES DAILY
Qty: 180 CAPSULE | Refills: 0 | OUTPATIENT
Start: 2021-02-09

## 2021-02-17 ENCOUNTER — IMMUNIZATIONS (OUTPATIENT)
Dept: FAMILY MEDICINE CLINIC | Facility: HOSPITAL | Age: 76
End: 2021-02-17

## 2021-02-17 DIAGNOSIS — Z23 ENCOUNTER FOR IMMUNIZATION: Primary | ICD-10-CM

## 2021-02-17 PROCEDURE — 0001A SARS-COV-2 / COVID-19 MRNA VACCINE (PFIZER-BIONTECH) 30 MCG: CPT

## 2021-02-17 PROCEDURE — 91300 SARS-COV-2 / COVID-19 MRNA VACCINE (PFIZER-BIONTECH) 30 MCG: CPT

## 2021-03-02 DIAGNOSIS — F41.9 ANXIETY: ICD-10-CM

## 2021-03-02 DIAGNOSIS — G25.81 RESTLESS LEG: ICD-10-CM

## 2021-03-02 NOTE — TELEPHONE ENCOUNTER
Per PDMP last fill 12/07/20  Last OV 06/15/20    12/07/2020 1 12/04/2020   CHLORDIAZEPOXIDE 5 MG CAPSULE  180 0 90 CH POG [id number] [facility] (0314) 0  Comm Ins [place]    09/05/2020 1 08/25/2020   CHLORDIAZEPOXIDE 5 MG CAPSULE  180 0 90 CH POG [id number] [facility] (3154) 0  Comm Ins [place]    06/09/2020 1 05/28/2020   CHLORDIAZEPOXIDE 5 MG CAPSULE  180 0 90 CH POG [id number] [facility] (3154) 0  Comm Ins [place]

## 2021-03-03 RX ORDER — PRAMIPEXOLE DIHYDROCHLORIDE 0.12 MG/1
TABLET ORAL
Qty: 180 TABLET | Refills: 0 | Status: SHIPPED | OUTPATIENT
Start: 2021-03-03 | End: 2021-07-06 | Stop reason: SDUPTHER

## 2021-03-03 RX ORDER — CHLORDIAZEPOXIDE HYDROCHLORIDE 5 MG/1
5 CAPSULE, GELATIN COATED ORAL 2 TIMES DAILY
Qty: 180 CAPSULE | Refills: 0 | Status: SHIPPED | OUTPATIENT
Start: 2021-03-03 | End: 2021-06-02 | Stop reason: SDUPTHER

## 2021-03-10 ENCOUNTER — IMMUNIZATIONS (OUTPATIENT)
Dept: FAMILY MEDICINE CLINIC | Facility: HOSPITAL | Age: 76
End: 2021-03-10

## 2021-03-10 DIAGNOSIS — Z23 ENCOUNTER FOR IMMUNIZATION: Primary | ICD-10-CM

## 2021-03-10 PROCEDURE — 0002A SARS-COV-2 / COVID-19 MRNA VACCINE (PFIZER-BIONTECH) 30 MCG: CPT

## 2021-03-10 PROCEDURE — 91300 SARS-COV-2 / COVID-19 MRNA VACCINE (PFIZER-BIONTECH) 30 MCG: CPT

## 2021-04-05 DIAGNOSIS — E11.9 TYPE 2 DIABETES MELLITUS WITHOUT COMPLICATION, WITHOUT LONG-TERM CURRENT USE OF INSULIN (HCC): ICD-10-CM

## 2021-05-06 DIAGNOSIS — I10 ESSENTIAL HYPERTENSION: ICD-10-CM

## 2021-05-07 RX ORDER — ATENOLOL 50 MG/1
50 TABLET ORAL DAILY
Qty: 90 TABLET | Refills: 0 | Status: SHIPPED | OUTPATIENT
Start: 2021-05-07 | End: 2021-08-02 | Stop reason: SDUPTHER

## 2021-06-02 DIAGNOSIS — F41.9 ANXIETY: ICD-10-CM

## 2021-06-02 NOTE — TELEPHONE ENCOUNTER
Last OV 6/15/2020  Next OV 6/10/2021    03/08/2021  1  03/03/2021  CHLORDIAZEPOXIDE 5 MG CAPSULE  180 0  90  CH POG  [id number]  THRIF (0314)  0   Comm Ins  [place]     12/07/2020  1  12/04/2020  CHLORDIAZEPOXIDE 5 MG CAPSULE  180 0  90  CH POG  [id number]  [facility] (0314)  0   Comm Ins  [place]     09/05/2020  1  08/25/2020  CHLORDIAZEPOXIDE 5 MG CAPSULE  180 0  90  CH POG  [id number]  [facility] (3155)  0   Comm Ins  [place]

## 2021-06-03 RX ORDER — CHLORDIAZEPOXIDE HYDROCHLORIDE 5 MG/1
5 CAPSULE, GELATIN COATED ORAL 2 TIMES DAILY
Qty: 180 CAPSULE | Refills: 0 | Status: SHIPPED | OUTPATIENT
Start: 2021-06-03 | End: 2021-07-06 | Stop reason: SDUPTHER

## 2021-06-10 ENCOUNTER — OFFICE VISIT (OUTPATIENT)
Dept: FAMILY MEDICINE CLINIC | Facility: CLINIC | Age: 76
End: 2021-06-10
Payer: MEDICARE

## 2021-06-10 VITALS
HEART RATE: 72 BPM | SYSTOLIC BLOOD PRESSURE: 120 MMHG | BODY MASS INDEX: 25.91 KG/M2 | TEMPERATURE: 98.2 F | WEIGHT: 132 LBS | HEIGHT: 60 IN | DIASTOLIC BLOOD PRESSURE: 76 MMHG

## 2021-06-10 DIAGNOSIS — Z00.00 MEDICARE ANNUAL WELLNESS VISIT, SUBSEQUENT: Primary | ICD-10-CM

## 2021-06-10 DIAGNOSIS — F33.41 RECURRENT MAJOR DEPRESSIVE DISORDER, IN PARTIAL REMISSION (HCC): ICD-10-CM

## 2021-06-10 DIAGNOSIS — E78.2 MIXED HYPERLIPIDEMIA: ICD-10-CM

## 2021-06-10 DIAGNOSIS — I10 ESSENTIAL HYPERTENSION: ICD-10-CM

## 2021-06-10 DIAGNOSIS — I65.21 CAROTID STENOSIS, RIGHT: ICD-10-CM

## 2021-06-10 DIAGNOSIS — F41.1 GENERALIZED ANXIETY DISORDER: ICD-10-CM

## 2021-06-10 DIAGNOSIS — E11.9 TYPE 2 DIABETES MELLITUS WITHOUT COMPLICATION, WITHOUT LONG-TERM CURRENT USE OF INSULIN (HCC): ICD-10-CM

## 2021-06-10 LAB
CREAT UR-MCNC: 35 MG/DL
MICROALBUMIN UR-MCNC: 10.3 MG/L (ref 0–20)
MICROALBUMIN/CREAT 24H UR: 29 MG/G CREATININE (ref 0–30)
SL AMB POCT HEMOGLOBIN AIC: 9.9 (ref ?–6.5)

## 2021-06-10 PROCEDURE — 1123F ACP DISCUSS/DSCN MKR DOCD: CPT | Performed by: FAMILY MEDICINE

## 2021-06-10 PROCEDURE — 83036 HEMOGLOBIN GLYCOSYLATED A1C: CPT | Performed by: FAMILY MEDICINE

## 2021-06-10 PROCEDURE — 99214 OFFICE O/P EST MOD 30 MIN: CPT | Performed by: FAMILY MEDICINE

## 2021-06-10 PROCEDURE — 82043 UR ALBUMIN QUANTITATIVE: CPT | Performed by: FAMILY MEDICINE

## 2021-06-10 PROCEDURE — G0439 PPPS, SUBSEQ VISIT: HCPCS | Performed by: FAMILY MEDICINE

## 2021-06-10 PROCEDURE — 82570 ASSAY OF URINE CREATININE: CPT | Performed by: FAMILY MEDICINE

## 2021-06-10 RX ORDER — TRAMADOL HYDROCHLORIDE 50 MG/1
50 TABLET ORAL EVERY 6 HOURS PRN
COMMUNITY
Start: 2020-07-17 | End: 2021-07-17

## 2021-06-10 RX ORDER — KETOCONAZOLE 20 MG/G
CREAM TOPICAL
COMMUNITY
Start: 2021-04-17

## 2021-06-10 RX ORDER — REPAGLINIDE 1 MG/1
1 TABLET ORAL
Qty: 90 TABLET | Refills: 1 | Status: SHIPPED | OUTPATIENT
Start: 2021-06-10 | End: 2021-10-21 | Stop reason: SDUPTHER

## 2021-06-10 RX ORDER — LOSARTAN POTASSIUM 25 MG/1
25 TABLET ORAL 2 TIMES DAILY
COMMUNITY
Start: 2021-06-02

## 2021-06-10 RX ORDER — BETAMETHASONE DIPROPIONATE 0.5 MG/G
OINTMENT TOPICAL
COMMUNITY
Start: 2021-04-14

## 2021-06-10 RX ORDER — CLOPIDOGREL BISULFATE 75 MG/1
75 TABLET ORAL DAILY
COMMUNITY
Start: 2020-06-26 | End: 2021-06-26

## 2021-06-10 NOTE — PROGRESS NOTES
Assessment and Plan:     Problem List Items Addressed This Visit        Endocrine    Type 2 diabetes mellitus ([facility] )       Lab Results   Component Value Date    HGBA1C 9 9 (A) 06/10/2021     Poor control  Urged diet compliance  Increase Prandin to 1mg with meals  Continue metformin  Recheck 3m         Relevant Medications    repaglinide (PRANDIN) 1 mg tablet    Other Relevant Orders    POCT hemoglobin A1c (Completed)    Microalbumin / creatinine urine ratio (Completed)    Comprehensive metabolic panel    Lipid panel       Cardiovascular and Mediastinum    Carotid stenosis, right     Due for repeat vasc duplex  Check labs  Continue repatha  Recheck 6m         Relevant Orders    CBC and differential    Comprehensive metabolic panel    Lipid panel    Hypertension     Well controlled  Cont present treatment  Monitor labs  Recheck 6m           Relevant Medications    losartan (COZAAR) 25 mg tablet       Other    Anxiety disorder     Counseled pt Continue present care  Consider counseling  Recheck 6m         Relevant Orders    CBC and differential    TSH, 3rd generation with Free T4 reflex    Hyperlipidemia     Check labs  Urged diet compliance  Recheck 6m         Recurrent major depressive disorder, in partial remission ([place] [id number] )     Depression Screening Follow-up Plan: Patient's depression screening was positive with a PHQ-2 score of 1  Their PHQ-9 score was 1  Patient assessed for underlying major depression  They have no active suicidal ideations  Brief counseling provided and recommend additional follow-up/re-evaluation next office visit  Recheck 6m             Other Visit Diagnoses     Medicare annual wellness visit, subsequent    -  Primary           Preventive health issues were discussed with patient, and age appropriate screening tests were ordered as noted in patient's After Visit Summary    Personalized health advice and appropriate referrals for health education or preventive services given if needed, as noted in patient's After Visit Summary  History of Present Illness:     Patient presents for Medicare Annual Wellness visit    Patient Care Team:  Asuncion Aguilar MD as PCP - Belinda Temple MD     Problem List:     Patient Active Problem List   Diagnosis    Anxiety disorder    Neck pain    Elevated ALT measurement    Elevated AST (SGOT)    Hyperlipidemia    Hypertension    [facility], [facility]    Heart block AV second degree    Type 2 diabetes mellitus ([facility]    Pacemaker    Health care maintenance    Heart block AV complete ([facility] )    Carotid stenosis, right    Tobacco abuse      Past Medical and Surgical History:     Past Medical History:   Diagnosis Date    Gastroenteritis      Past Surgical History:   Procedure Laterality Date    ATRIAL CARDIAC PACEMAKER INSERTION        Family History:     Family History   Problem Relation Age of Onset    Diabetes Sister     Dementia Sister       Social History:        Social History     Socioeconomic History    Marital status:      Spouse name: None    Number of children: None    Years of education: None    Highest education level: None   Occupational History    None   Tobacco Use    Smoking status: Former Smoker    Smokeless tobacco: Never Used   Substance and Sexual Activity    Alcohol use: Not Currently    Drug use: Never    Sexual activity: None   Other Topics Concern    None   Social History Narrative    None     Social Determinants of Health     Financial Resource Strain:     Difficulty of Paying Living Expenses:    Food Insecurity:     Worried About Running Out of Food in the Last Year:     Ran Out of Food in the Last Year:    Transportation Needs:     Lack of Transportation (Medical):      Lack of Transportation (Non-Medical):    Physical Activity:     Days of Exercise per Week:     Minutes of Exercise per Session:    Stress:     Feeling of Stress :    Social Connections:  Frequency of Communication with Friends and Family:     Frequency of Social Gatherings with Friends and Family:     Attends S[employer] Services:     Active Member of Clubs or Organizations:     Attends Club or Organization Meetings:     Marital Status:    Intimate Partner Violence:     Fear of Current or Ex-Partner:     Emotionally Abused:     Physically Abused:     Sexually Abused:       Medications and Allergies:     Current Outpatient Medications   Medication Sig Dispense Refill    amLODIPine (NORVASC) 5 mg tablet Take 1 tablet (5 mg total) by mouth daily 90 tablet 3    ascorbic acid (VITAMIN C) 500 mg tablet Take by mouth daily      atenolol (TENORMIN) 50 mg tablet Take 1 tablet (50 mg total) by mouth daily 90 tablet 0    [place] MICROLET LANCETS lancets Use as instructed 100 each 5    betamethasone, augmented, (DIPROLENE) 0 05 % ointment APPLYTWICE DAILY TO AFFECTED AREA ON LEG FOR 2 WEEKS THEN REDUCE      (REFER TO PRESCRIPTION NOTES)   Blood Glucose Monitoring Suppl (CONTOUR NEXT MONITOR) w/Device KIT As directed 1 kit 1    chlordiazePOXIDE (LIBRIUM) 5 mg capsule Take 1 capsule (5 mg total) by mouth 2 (two) times a day 180 capsule 0    clopidogrel (PLAVIX) 75 mg tablet Take 75 mg by mouth daily      Evolocumab (Repatha) 140 MG/ML SOSY Inject 140 mg under the skin every 14 (fourteen) days      fluticasone (FLONASE) 50 mcg/act nasal spray 2 sprays into each nostril daily 2 sprays bilat qd 1 Bottle 2    glucose blood (CONTOUR NEXT TEST) test strip Use as instructed 50 each 5    hydrocortisone 2 5 % cream MIX PEA SIZED DOSE WITH KETOCONAZOLE CREAM AND APPLY UNDER BREAST     (REFER TO PRESCRIPTION NOTES)        imipramine (TOFRANIL) 25 mg tablet take 1 tablet by mouth twice a day 180 tablet 3    ketoconazole (NIZORAL) 2 % cream APPLY TOPICALLY TWICE DAILY UNTIL RASH CLEARS UNDER BREASTS      losartan (COZAAR) 25 mg tablet Take 25 mg by mouth 2 (two) times a day      Meloxicam 7 5 MG TBDP Take by mouth daily      metFORMIN (GLUCOPHAGE) 500 mg tablet Take 1 tablet (500 mg total) by mouth 2 (two) times a day with meals 180 tablet 0    pantoprazole (PROTONIX) 20 mg tablet take 1 tablet by mouth once daily before breakfast 30 tablet 2    pramipexole (MIRAPEX) 0 125 mg tablet take 1 to 2 tablets 1 TO 2 HOURS BEFORE BEDTIME 180 tablet 0    RA Aspirin EC 81 MG EC tablet take 1 tablet by mouth once daily 90 tablet 0    repaglinide (PRANDIN) 1 mg tablet Take 1 tablet (1 mg total) by mouth 3 (three) times a day before meals 90 tablet 1    traMADol (ULTRAM) 50 mg tablet Take 50 mg by mouth every 6 (six) hours as needed      triamcinolone (KENALOG) 0 1 % cream Apply topically 2 (two) times a day 30 g 0    vitamin E 100 UNIT capsule Take 800 Units by mouth      Vitamin E 400 units TABS Take 2 tablets by mouth daily       No current facility-administered medications for this visit  Allergies   Allergen Reactions    Atorvastatin      Reaction Date: 23Dec2011; Unknown reaction  LFT increased    Clarithromycin      Reaction Date: 23Dec2011;   Patient does not recall reaction    Hydrogen Peroxide Swelling     Reaction Date: 23Dec2011;   Swelling in the mouth    Levofloxacin      Patient does not recall her reaction    Other Swelling     Perioxol (mouth rinse)  Perioxol (mouth rinse)    Penicillins Hives and Swelling    Repaglinide Diarrhea    Rosuvastatin      Reaction Date: 23Dec2011;    Unknown reaction per patient  Increase LFT      Immunizations:     Immunization History   Administered Date(s) Administered    SARS-CoV-2 / COVID-19 mRNA IM (Pfizer-BioNTech) 02/17/2021, 03/10/2021    Tuberculin Skin Test-PPD Intradermal 04/14/2008      Health Maintenance:         Topic Date Due    DXA SCAN  Never done    Cervical Cancer Screening  Never done    Colorectal Cancer Screening  Never done    Hepatitis C Screening  Completed         Topic Date Due    Pneumococcal Vaccine: 65+ Years (1 of 2 - PPSV23) Never done    DTaP,Tdap,and Td Vaccines (1 - Tdap) Never done    Influenza Vaccine (Season Ended) 09/01/2021      Medicare Health Risk Assessment:     /76   Pulse 72   Temp 98 2 °F (36 8 °C)   Ht 5' (1 524 m)   Wt 59 9 kg (132 lb)   BMI 25 78 kg/m²      Karli Khan is here for her Subsequent Wellness visit  Last Medicare Wellness visit information reviewed, patient interviewed and updates made to the record today  Health Risk Assessment:   Patient rates overall health as good  Patient feels that their physical health rating is much better  Patient is very satisfied with their life  Eyesight was rated as same  Hearing was rated as same  Patient feels that their emotional and mental health rating is same  Patients states they are never, rarely angry  Patient states they are never, rarely unusually tired/fatigued  Pain experienced in the last 7 days has been none  Patient states that she has experienced no weight loss or gain in last 6 months  Depression Screening:   PHQ-2 Score: 1  PHQ-9 Score: 1      Fall Risk Screening: In the past year, patient has experienced: no history of falling in past year      Urinary Incontinence Screening:   Patient has not leaked urine accidently in the last six months  Home Safety:  Patient does not have trouble with stairs inside or outside of their home  Patient has working smoke alarms and has working carbon monoxide detector  Home safety hazards include: none  Nutrition:   Current diet is Regular  Medications:   Patient is currently taking over-the-counter supplements  OTC medications include: see medication list  Patient is able to manage medications  Activities of Daily Living (ADLs)/Instrumental Activities of Daily Living (IADLs):   Walk and transfer into and out of bed and chair?: Yes  Dress and groom yourself?: Yes    Bathe or shower yourself?: Yes    Feed yourself?  Yes  Do your laundry/housekeeping?: Yes  Manage your money, pay your bills and track your expenses?: Yes  Make your own meals?: Yes    Do your own shopping?: Yes    Previous Hospitalizations:   Any hospitalizations or ED visits within the last 12 months?: No      Advance Care Planning:   Living will: Yes    Durable POA for healthcare: Yes    Advanced directive: Yes    Five wishes given: Yes      Cognitive Screening:   Provider or family/friend/caregiver concerned regarding cognition?: No    PREVENTIVE SCREENINGS      Cardiovascular Screening:    General: Screening Not Indicated and History Lipid Disorder      Diabetes Screening:     General: Screening Not Indicated and History Diabetes      Colorectal Cancer Screening:     General: Patient Declines      Breast Cancer Screening:     General: Patient Declines      Cervical Cancer Screening:    General: Screening Not Indicated      Osteoporosis Screening:    General: Patient Declines      Abdominal Aortic Aneurysm (AAA) Screening:        General: Screening Not Indicated      Lung Cancer Screening:     General: Screening Not Indicated      Hepatitis C Screening:    General: Screening Current    Screening, Brief Intervention, and Referral to Treatment (SBIRT)    Screening  Typical number of drinks in a day: 0    AUDIT-C Screenin) How often did you have a drink containing alcohol in the past year? never  2) How many drinks did you have on a typical day when you were drinking in the past year? never  3) How often did you have 6 or more drinks on one occasion in the past year? never    AUDIT-C Score: 0  Interpretation: Score 0-2 (female): Negative screen for alcohol misuse    Single Item Drug Screening:  How often have you used an illegal drug (including marijuana) or a prescription medication for non-medical reasons in the past year? never    Single Item Drug Screen Score: 0  Interpretation: Negative screen for possible drug use disorder    Other Counseling Topics:   Calcium and vitamin D intake and regular weightbearing exercise  BMI Counseling: Body mass index is 25 78 kg/m²  The BMI is above normal  Nutrition recommendations include moderation in carbohydrate intake, increasing intake of lean protein, reducing intake of saturated fat and trans fat and reducing intake of cholesterol      Marian Fountain MD

## 2021-06-10 NOTE — PATIENT INSTRUCTIONS
Medicare Preventive Visit Patient Instructions  Thank you for completing your Welcome to Medicare Visit or Medicare Annual Wellness Visit today  Your next wellness visit will be due in one year (6/11/2022)  The screening/preventive services that you may require over the next 5-10 years are detailed below  Some tests may not apply to you based off risk factors and/or age  Screening tests ordered at today's visit but not completed yet may show as past due  Also, please note that scanned in results may not display below  Preventive Screenings:  Service Recommendations Previous Testing/Comments   Colorectal Cancer Screening  * Colonoscopy    * Fecal Occult Blood Test (FOBT)/Fecal Immunochemical Test (FIT)  * Fecal DNA/Cologuard Test  * Flexible Sigmoidoscopy Age: 54-65 years old   Colonoscopy: every 10 years (may be performed more frequently if at higher risk)  OR  FOBT/FIT: every 1 year  OR  Cologuard: every 3 years  OR  Sigmoidoscopy: every 5 years  Screening may be recommended earlier than age 48 if at higher risk for colorectal cancer  Also, an individualized decision between you and your healthcare provider will decide whether screening between the ages of 74-80 would be appropriate  Colonoscopy: Not on file  FOBT/FIT: Not on file  Cologuard: Not on file  Sigmoidoscopy: Not on file          Breast Cancer Screening Age: 36 years old  Frequency: every 1-2 years  Not required if history of left and right mastectomy Mammogram: Not on file        Cervical Cancer Screening Between the ages of 21-29, pap smear recommended once every 3 years  Between the ages of 33-67, can perform pap smear with HPV co-testing every 5 years     Recommendations may differ for women with a history of total hysterectomy, cervical cancer, or abnormal pap smears in past  Pap Smear: Not on file    Screening Not Indicated   Hepatitis C Screening Once for adults born between [facility]  More frequently in patients at high risk for Hepatitis C Hep C Antibody: 03/22/2019    Screening Current   Diabetes Screening 1-2 times per year if you're at risk for diabetes or have pre-diabetes Fasting glucose: 122 mg/dL   A1C: 8 7 %    Screening Not Indicated  History Diabetes   Cholesterol Screening Once every 5 years if you don't have a lipid disorder  May order more often based on risk factors  Lipid panel: 06/13/2020    Screening Not Indicated  History Lipid Disorder     Other Preventive Screenings Covered by Medicare:  1  Abdominal Aortic Aneurysm (AAA) Screening: covered once if your at risk  You're considered to be at risk if you have a family history of AAA  2  Lung Cancer Screening: covers low dose CT scan once per year if you meet all of the following conditions: (1) Age 50-69; (2) No signs or symptoms of lung cancer; (3) Current smoker or have quit smoking within the last 15 years; (4) You have a tobacco smoking history of at least 30 pack years (packs per day multiplied by number of years you smoked); (5) You get a written order from a healthcare provider  3  Glaucoma Screening: covered annually if you're considered high risk: (1) You have diabetes OR (2) Family history of glaucoma OR (3)  aged 48 and older OR (3)  American aged 72 and older  3  Osteoporosis Screening: covered every 2 years if you meet one of the following conditions: (1) You're estrogen deficient and at risk for osteoporosis based off medical history and other findings; (2) Have a vertebral abnormality; (3) On glucocorticoid therapy for more than 3 months; (4) Have primary hyperparathyroidism; (5) On osteoporosis medications and need to assess response to drug therapy  · Last bone density test (DXA Scan): Not on file  5  HIV Screening: covered annually if you're between the age of 12-76  Also covered annually if you are younger than 13 and older than 72 with risk factors for HIV infection   For pregnant patients, it is covered up to 3 times per pregnancy  Immunizations:  Immunization Recommendations   Influenza Vaccine Annual influenza vaccination during flu season is recommended for all persons aged >= 6 months who do not have contraindications   Pneumococcal Vaccine (Prevnar and Pneumovax)  * Prevnar = PCV13  * Pneumovax = PPSV23   Adults 25-60 years old: 1-3 doses may be recommended based on certain risk factors  Adults 72 years old: Prevnar (PCV13) vaccine recommended followed by Pneumovax (PPSV23) vaccine  If already received PPSV23 since turning 65, then PCV13 recommended at least one year after PPSV23 dose  Hepatitis B Vaccine 3 dose series if at intermediate or high risk (ex: diabetes, end stage renal disease, liver disease)   Tetanus (Td) Vaccine - COST NOT COVERED BY MEDICARE PART B Following completion of primary series, a booster dose should be given every 10 years to maintain immunity against tetanus  Td may also be given as tetanus wound prophylaxis  Tdap Vaccine - COST NOT COVERED BY MEDICARE PART B Recommended at least once for all adults  For pregnant patients, recommended with each pregnancy  Shingles Vaccine (Shingrix) - COST NOT COVERED BY MEDICARE PART B  2 shot series recommended in those aged 48 and above     Health Maintenance Due:      Topic Date Due    DXA SCAN  Never done    Cervical Cancer Screening  Never done    Colorectal Cancer Screening  Never done    Hepatitis C Screening  Completed     Immunizations Due:      Topic Date Due    DTaP,Tdap,and Td Vaccines (1 - Tdap) Never done    Pneumococcal Vaccine: 65+ Years (1 of 1 - PPSV23) Never done    Influenza Vaccine (Season Ended) 09/01/2021     Advance Directives   What are advance directives? Advance directives are legal documents that state your wishes and plans for medical care  These plans are made ahead of time in case you lose your ability to make decisions for yourself   Advance directives can apply to any medical decision, such as the treatments you want, and if you want to donate organs  What are the types of advance directives? There are many types of advance directives, and each state has rules about how to use them  You may choose a combination of any of the following:  · Living will: This is a written record of the treatment you want  You can also choose which treatments you do not want, which to limit, and which to stop at a certain time  This includes surgery, medicine, IV fluid, and tube feedings  · Durable power of  for healthcare [facility]): This is a written record that states who you want to make healthcare choices for you when you are unable to make them for yourself  This person, called a proxy, is usually a family member or a friend  You may choose more than 1 proxy  · Do not resuscitate (DNR) order:  A DNR order is used in case your heart stops beating or you stop breathing  It is a request not to have certain forms of treatment, such as CPR  A DNR order may be included in other types of advance directives  · Medical directive: This covers the care that you want if you are in a coma, near death, or unable to make decisions for yourself  You can list the treatments you want for each condition  Treatment may include pain medicine, surgery, blood transfusions, dialysis, IV or tube feedings, and a ventilator (breathing machine)  · Values history: This document has questions about your views, beliefs, and how you feel and think about life  This information can help others choose the care that you would choose  Why are advance directives important? An advance directive helps you control your care  Although spoken wishes may be used, it is better to have your wishes written down  Spoken wishes can be misunderstood, or not followed  Treatments may be given even if you do not want them  An advance directive may make it easier for your family to make difficult choices about your care     Weight Management   Why it is important to manage your weight:  Being overweight increases your risk of health conditions such as heart disease, high blood pressure, type 2 diabetes, and certain types of cancer  It can also increase your risk for osteoarthritis, sleep apnea, and other respiratory problems  Aim for a slow, steady weight loss  Even a small amount of weight loss can lower your risk of health problems  How to lose weight safely:  A safe and healthy way to lose weight is to eat fewer calories and get regular exercise  You can lose up about 1 pound a week by decreasing the number of calories you eat by 500 calories each day  Healthy meal plan for weight management:  A healthy meal plan includes a variety of foods, contains fewer calories, and helps you stay healthy  A healthy meal plan includes the following:  · Eat whole-grain foods more often  A healthy meal plan should contain fiber  Fiber is the part of grains, fruits, and vegetables that is not broken down by your body  Whole-grain foods are healthy and provide extra fiber in your diet  Some examples of whole-grain foods are whole-wheat breads and pastas, oatmeal, brown rice, and bulgur  · Eat a variety of vegetables every day  Include dark, leafy greens such as spinach, kale, rox greens, and mustard greens  Eat yellow and orange vegetables such as carrots, sweet potatoes, and winter squash  · Eat a variety of fruits every day  Choose fresh or canned fruit (canned in its own juice or light syrup) instead of juice  Fruit juice has very little or no fiber  · Eat low-fat dairy foods  Drink fat-free (skim) milk or 1% milk  Eat fat-free yogurt and low-fat cottage cheese  Try low-fat cheeses such as mozzarella and other reduced-fat cheeses  · Choose meat and other protein foods that are low in fat  Choose beans or other legumes such as split peas or lentils  Choose fish, skinless poultry (chicken or turkey), or lean cuts of red meat (beef or pork)   Before you cook meat or poultry, cut off any visible fat  · Use less fat and oil  Try baking foods instead of frying them  Add less fat, such as margarine, sour cream, regular salad dressing and mayonnaise to foods  Eat fewer high-fat foods  Some examples of high-fat foods include french fries, doughnuts, ice cream, and cakes  · Eat fewer sweets  Limit foods and drinks that are high in sugar  This includes candy, cookies, regular soda, and sweetened drinks  Exercise:  Exercise at least 30 minutes per day on most days of the week  Some examples of exercise include walking, biking, dancing, and swimming  You can also fit in more physical activity by taking the stairs instead of the elevator or parking farther away from stores  Ask your healthcare provider about the best exercise plan for you  © Copyright [employer] 2018 Information is for End User's use only and may not be sold, redistributed or otherwise used for commercial purposes  All illustrations and images included in CareNotes® are the copyrighted property of [employer]  or [facility] Preventive Visit Patient Instructions  Thank you for completing your Welcome to Medicare Visit or Medicare Annual Wellness Visit today  Your next wellness visit will be due in one year (6/11/2022)  The screening/preventive services that you may require over the next 5-10 years are detailed below  Some tests may not apply to you based off risk factors and/or age  Screening tests ordered at today's visit but not completed yet may show as past due  Also, please note that scanned in results may not display below    Preventive Screenings:  Service Recommendations Previous Testing/Comments   Colorectal Cancer Screening  * Colonoscopy    * Fecal Occult Blood Test (FOBT)/Fecal Immunochemical Test (FIT)  * Fecal DNA/Cologuard Test  * Flexible Sigmoidoscopy Age: 54-65 years old   Colonoscopy: every 10 years (may be performed more frequently if at higher risk)  OR  FOBT/FIT: every 1 year OR  Cologuard: every 3 years  OR  Sigmoidoscopy: every 5 years  Screening may be recommended earlier than age 48 if at higher risk for colorectal cancer  Also, an individualized decision between you and your healthcare provider will decide whether screening between the ages of 74-80 would be appropriate  Colonoscopy: Not on file  FOBT/FIT: Not on file  Cologuard: Not on file  Sigmoidoscopy: Not on file          Breast Cancer Screening Age: 36 years old  Frequency: every 1-2 years  Not required if history of left and right mastectomy Mammogram: Not on file        Cervical Cancer Screening Between the ages of 21-29, pap smear recommended once every 3 years  Between the ages of 33-67, can perform pap smear with HPV co-testing every 5 years  Recommendations may differ for women with a history of total hysterectomy, cervical cancer, or abnormal pap smears in past  Pap Smear: Not on file    Screening Not Indicated   Hepatitis C Screening Once for adults born between 1945 and 1965  More frequently in patients at high risk for Hepatitis C Hep C Antibody: 03/22/2019    Screening Current   Diabetes Screening 1-2 times per year if you're at risk for diabetes or have pre-diabetes Fasting glucose: 122 mg/dL   A1C: 9 9    Screening Not Indicated  History Diabetes   Cholesterol Screening Once every 5 years if you don't have a lipid disorder  May order more often based on risk factors  Lipid panel: 06/13/2020    Screening Not Indicated  History Lipid Disorder     Other Preventive Screenings Covered by Medicare:  6  Abdominal Aortic Aneurysm (AAA) Screening: covered once if your at risk  You're considered to be at risk if you have a family history of AAA    7  Lung Cancer Screening: covers low dose CT scan once per year if you meet all of the following conditions: (1) Age 50-69; (2) No signs or symptoms of lung cancer; (3) Current smoker or have quit smoking within the last 15 years; (4) You have a tobacco smoking history of at least 30 pack years (packs per day multiplied by number of years you smoked); (5) You get a written order from a healthcare provider  8  Glaucoma Screening: covered annually if you're considered high risk: (1) You have diabetes OR (2) Family history of glaucoma OR (3)  aged 48 and older OR (3)  American aged 72 and older  5  Osteoporosis Screening: covered every 2 years if you meet one of the following conditions: (1) You're estrogen deficient and at risk for osteoporosis based off medical history and other findings; (2) Have a vertebral abnormality; (3) On glucocorticoid therapy for more than 3 months; (4) Have primary hyperparathyroidism; (5) On osteoporosis medications and need to assess response to drug therapy  · Last bone density test (DXA Scan): Not on file  10  HIV Screening: covered annually if you're between the age of 12-76  Also covered annually if you are younger than 13 and older than 72 with risk factors for HIV infection  For pregnant patients, it is covered up to 3 times per pregnancy  Immunizations:  Immunization Recommendations   Influenza Vaccine Annual influenza vaccination during flu season is recommended for all persons aged >= 6 months who do not have contraindications   Pneumococcal Vaccine (Prevnar and Pneumovax)  * Prevnar = PCV13  * Pneumovax = PPSV23   Adults 25-60 years old: 1-3 doses may be recommended based on certain risk factors  Adults 72 years old: Prevnar (PCV13) vaccine recommended followed by Pneumovax (PPSV23) vaccine  If already received PPSV23 since turning 65, then PCV13 recommended at least one year after PPSV23 dose  Hepatitis B Vaccine 3 dose series if at intermediate or high risk (ex: diabetes, end stage renal disease, liver disease)   Tetanus (Td) Vaccine - COST NOT COVERED BY MEDICARE PART B Following completion of primary series, a booster dose should be given every 10 years to maintain immunity against tetanus   Td may also be given as tetanus wound prophylaxis  Tdap Vaccine - COST NOT COVERED BY MEDICARE PART B Recommended at least once for all adults  For pregnant patients, recommended with each pregnancy  Shingles Vaccine (Shingrix) - COST NOT COVERED BY MEDICARE PART B  2 shot series recommended in those aged 48 and above     Health Maintenance Due:      Topic Date Due    DXA SCAN  Never done    Cervical Cancer Screening  Never done    Colorectal Cancer Screening  Never done    Hepatitis C Screening  Completed     Immunizations Due:      Topic Date Due    DTaP,Tdap,and Td Vaccines (1 - Tdap) Never done    Pneumococcal Vaccine: 65+ Years (1 of 1 - PPSV23) Never done    Influenza Vaccine (Season Ended) 09/01/2021     Advance Directives   What are advance directives? Advance directives are legal documents that state your wishes and plans for medical care  These plans are made ahead of time in case you lose your ability to make decisions for yourself  Advance directives can apply to any medical decision, such as the treatments you want, and if you want to donate organs  What are the types of advance directives? There are many types of advance directives, and each state has rules about how to use them  You may choose a combination of any of the following:  · Living will: This is a written record of the treatment you want  You can also choose which treatments you do not want, which to limit, and which to stop at a certain time  This includes surgery, medicine, IV fluid, and tube feedings  · Durable power of  for healthcare [facility]): This is a written record that states who you want to make healthcare choices for you when you are unable to make them for yourself  This person, called a proxy, is usually a family member or a friend  You may choose more than 1 proxy  · Do not resuscitate (DNR) order:  A DNR order is used in case your heart stops beating or you stop breathing   It is a request not to have certain forms of treatment, such as CPR  A DNR order may be included in other types of advance directives  · Medical directive: This covers the care that you want if you are in a coma, near death, or unable to make decisions for yourself  You can list the treatments you want for each condition  Treatment may include pain medicine, surgery, blood transfusions, dialysis, IV or tube feedings, and a ventilator (breathing machine)  · Values history: This document has questions about your views, beliefs, and how you feel and think about life  This information can help others choose the care that you would choose  Why are advance directives important? An advance directive helps you control your care  Although spoken wishes may be used, it is better to have your wishes written down  Spoken wishes can be misunderstood, or not followed  Treatments may be given even if you do not want them  An advance directive may make it easier for your family to make difficult choices about your care  Weight Management   Why it is important to manage your weight:  Being overweight increases your risk of health conditions such as heart disease, high blood pressure, type 2 diabetes, and certain types of cancer  It can also increase your risk for osteoarthritis, sleep apnea, and other respiratory problems  Aim for a slow, steady weight loss  Even a small amount of weight loss can lower your risk of health problems  How to lose weight safely:  A safe and healthy way to lose weight is to eat fewer calories and get regular exercise  You can lose up about 1 pound a week by decreasing the number of calories you eat by 500 calories each day  Healthy meal plan for weight management:  A healthy meal plan includes a variety of foods, contains fewer calories, and helps you stay healthy  A healthy meal plan includes the following:  · Eat whole-grain foods more often  A healthy meal plan should contain fiber   Fiber is the part of grains, fruits, and vegetables that is not broken down by your body  Whole-grain foods are healthy and provide extra fiber in your diet  Some examples of whole-grain foods are whole-wheat breads and pastas, oatmeal, brown rice, and bulgur  · Eat a variety of vegetables every day  Include dark, leafy greens such as spinach, kale, rox greens, and mustard greens  Eat yellow and orange vegetables such as carrots, sweet potatoes, and winter squash  · Eat a variety of fruits every day  Choose fresh or canned fruit (canned in its own juice or light syrup) instead of juice  Fruit juice has very little or no fiber  · Eat low-fat dairy foods  Drink fat-free (skim) milk or 1% milk  Eat fat-free yogurt and low-fat cottage cheese  Try low-fat cheeses such as mozzarella and other reduced-fat cheeses  · Choose meat and other protein foods that are low in fat  Choose beans or other legumes such as split peas or lentils  Choose fish, skinless poultry (chicken or turkey), or lean cuts of red meat (beef or pork)  Before you cook meat or poultry, cut off any visible fat  · Use less fat and oil  Try baking foods instead of frying them  Add less fat, such as margarine, sour cream, regular salad dressing and mayonnaise to foods  Eat fewer high-fat foods  Some examples of high-fat foods include french fries, doughnuts, ice cream, and cakes  · Eat fewer sweets  Limit foods and drinks that are high in sugar  This includes candy, cookies, regular soda, and sweetened drinks  Exercise:  Exercise at least 30 minutes per day on most days of the week  Some examples of exercise include walking, biking, dancing, and swimming  You can also fit in more physical activity by taking the stairs instead of the elevator or parking farther away from stores  Ask your healthcare provider about the best exercise plan for you        © Copyright [employer] 2018 Information is for End User's use only and may not be sold, redistributed or otherwise used for commercial purposes   All illustrations and images included in CareNotes® are the copyrighted property of [employer]  or Anita Aragon

## 2021-06-10 NOTE — PROGRESS NOTES
Assessment/Plan:    Type 2 diabetes mellitus ([place] [address] )    Lab Results   Component Value Date    HGBA1C 9 9 (A) 06/10/2021     Poor control  Urged diet compliance  Increase Prandin to 1mg with meals  Continue metformin  Recheck 3m    Carotid stenosis, right  Due for repeat vasc duplex  Check labs  Continue repatha  Recheck 6m    Hypertension  Well controlled  Cont present treatment  Monitor labs  Recheck 6m      Anxiety disorder  Counseled pt Continue present care  Consider counseling  Recheck 6m    Hyperlipidemia  Check labs  Urged diet compliance  Recheck 6m    Recurrent major depressive disorder, in partial remission ([place] [address] )  Depression Screening Follow-up Plan: Patient's depression screening was positive with a PHQ-2 score of 1  Their PHQ-9 score was 1  Patient assessed for underlying major depression  They have no active suicidal ideations  Brief counseling provided and recommend additional follow-up/re-evaluation next office visit  Recheck 6m         Diagnoses and all orders for this visit:    Medicare annual wellness visit, subsequent    Type 2 diabetes mellitus without complication, without long-term current use of insulin (HCC)  -     POCT hemoglobin A1c  -     Microalbumin / creatinine urine ratio  -     repaglinide (PRANDIN) 1 mg tablet; Take 1 tablet (1 mg total) by mouth 3 (three) times a day before meals  -     Comprehensive metabolic panel; Future  -     Lipid panel; Future    Carotid stenosis, right  -     CBC and differential; Future  -     Comprehensive metabolic panel; Future  -     Lipid panel; Future    Mixed hyperlipidemia    Recurrent major depressive disorder, in partial remission (HCC)    Generalized anxiety disorder  -     CBC and differential; Future  -     TSH, 3rd generation with Free T4 reflex; Future    Essential hypertension    Other orders  -     traMADol (ULTRAM) 50 mg tablet; Take 50 mg by mouth every 6 (six) hours as needed  -     losartan (COZAAR) 25 mg tablet;  Take 25 mg by mouth 2 (two) times a day  -     hydrocortisone 2 5 % cream; MIX PEA SIZED DOSE WITH KETOCONAZOLE CREAM AND APPLY UNDER BREAST     (REFER TO PRESCRIPTION NOTES)  -     clopidogrel (PLAVIX) 75 mg tablet; Take 75 mg by mouth daily  -     ketoconazole (NIZORAL) 2 % cream; APPLY TOPICALLY TWICE DAILY UNTIL RASH CLEARS UNDER BREASTS  -     betamethasone, augmented, (DIPROLENE) 0 05 % ointment; APPLYTWICE DAILY TO AFFECTED AREA ON LEG FOR 2 WEEKS THEN REDUCE      (REFER TO PRESCRIPTION NOTES)  -     vitamin E 100 UNIT capsule; Take 800 Units by mouth          Subjective:      Patient ID: Vera Tyler is a 76 y o  female  f/u multiple med issues and AWV  - pt notes sl increased anxiety  Pt had both COVID vaccinations [employer])  - pt is s/p stenting of the R carotid  Due for repeat duplex  R tinntus has resolved  - pt has been taking metformin 500mg and Prandin 0 5mg tid for her diabetes  Has not been watching diet consistently  A1C in office = 9 9 today  - pt not exercising but is keeping active  Pt denies CP, palpitations, lightheadedness or other CV symptoms with or without exertion  Pt had a negative nuclear stress test last year due for preop clearance  - no new GI or  complaints  - AWV done        The following portions of the patient's history were reviewed and updated as appropriate:   She  has a past medical history of Gastroenteritis    She   Patient Active Problem List    Diagnosis Date Noted    Carotid stenosis, right 06/16/2020    Heart block AV complete ([facility] [address] ) 06/15/2020    Health care maintenance 09/26/2018    Neck pain 06/06/2017    Recurrent major depressive disorder, in partial remission ([facility] [address] ) 05/10/2017    Pacemaker 09/25/2015    Tobacco abuse 09/17/2015    Heart block AV second degree 09/16/2015    Elevated ALT measurement 06/17/2013    Elevated AST (SGOT) 06/17/2013    Hyperlipidemia 06/17/2013    Type 2 diabetes mellitus ([address] ) 06/17/2013    Anxiety disorder 06/10/2013    Hypertension 09/27/2012     She  has a past surgical history that includes Atrial cardiac pacemaker insertion  She  reports that she has quit smoking  She has never used smokeless tobacco  She reports previous alcohol use  She reports that she does not use drugs  Current Outpatient Medications   Medication Sig Dispense Refill    amLODIPine (NORVASC) 5 mg tablet Take 1 tablet (5 mg total) by mouth daily 90 tablet 3    ascorbic acid (VITAMIN C) 500 mg tablet Take by mouth daily      atenolol (TENORMIN) 50 mg tablet Take 1 tablet (50 mg total) by mouth daily 90 tablet 0    salgomed MICROLET LANCETS lancets Use as instructed 100 each 5    betamethasone, augmented, (DIPROLENE) 0 05 % ointment APPLYTWICE DAILY TO AFFECTED AREA ON LEG FOR 2 WEEKS THEN REDUCE      (REFER TO PRESCRIPTION NOTES)   Blood Glucose Monitoring Suppl (CONTOUR NEXT MONITOR) w/Device KIT As directed 1 kit 1    chlordiazePOXIDE (LIBRIUM) 5 mg capsule Take 1 capsule (5 mg total) by mouth 2 (two) times a day 180 capsule 0    clopidogrel (PLAVIX) 75 mg tablet Take 75 mg by mouth daily      Evolocumab (Repatha) 140 MG/ML SOSY Inject 140 mg under the skin every 14 (fourteen) days      fluticasone (FLONASE) 50 mcg/act nasal spray 2 sprays into each nostril daily 2 sprays bilat qd 1 Bottle 2    glucose blood (CONTOUR NEXT TEST) test strip Use as instructed 50 each 5    hydrocortisone 2 5 % cream MIX PEA SIZED DOSE WITH KETOCONAZOLE CREAM AND APPLY UNDER BREAST     (REFER TO PRESCRIPTION NOTES)        imipramine (TOFRANIL) 25 mg tablet take 1 tablet by mouth twice a day 180 tablet 3    ketoconazole (NIZORAL) 2 % cream APPLY TOPICALLY TWICE DAILY UNTIL RASH CLEARS UNDER BREASTS      losartan (COZAAR) 25 mg tablet Take 25 mg by mouth 2 (two) times a day      Meloxicam 7 5 MG TBDP Take by mouth daily      metFORMIN (GLUCOPHAGE) 500 mg tablet Take 1 tablet (500 mg total) by mouth 2 (two) times a day with meals 180 tablet 0    pantoprazole (PROTONIX) 20 mg tablet take 1 tablet by mouth once daily before breakfast 30 tablet 2    pramipexole (MIRAPEX) 0 125 mg tablet take 1 to 2 tablets 1 TO 2 HOURS BEFORE BEDTIME 180 tablet 0    RA Aspirin EC 81 MG EC tablet take 1 tablet by mouth once daily 90 tablet 0    repaglinide (PRANDIN) 1 mg tablet Take 1 tablet (1 mg total) by mouth 3 (three) times a day before meals 90 tablet 1    traMADol (ULTRAM) 50 mg tablet Take 50 mg by mouth every 6 (six) hours as needed      triamcinolone (KENALOG) 0 1 % cream Apply topically 2 (two) times a day 30 g 0    vitamin E 100 UNIT capsule Take 800 Units by mouth      Vitamin E 400 units TABS Take 2 tablets by mouth daily       No current facility-administered medications for this visit  She is allergic to atorvastatin, clarithromycin, hydrogen peroxide, levofloxacin, other, penicillins, repaglinide, and rosuvastatin       Review of Systems   Constitutional: Negative  HENT: Negative  Eyes: Negative  Respiratory: Negative  Cardiovascular: Negative  Gastrointestinal: Negative  Endocrine: Negative  Genitourinary: Negative  Musculoskeletal: Positive for arthralgias and back pain  Negative for gait problem and myalgias  Skin: Negative  Allergic/Immunologic: Negative  Neurological: Negative  Hematological: Negative  Psychiatric/Behavioral: Negative for dysphoric mood, self-injury and suicidal ideas  The patient is nervous/anxious  Objective:      /76   Pulse 72   Temp 98 2 °F (36 8 °C)   Ht 5' (1 524 m)   Wt 59 9 kg (132 lb)   BMI 25 78 kg/m²          Physical Exam  Vitals reviewed  Constitutional:       Appearance: She is well-developed  She is not diaphoretic  HENT:      Head: Normocephalic and atraumatic  Right Ear: Tympanic membrane, ear canal and external ear normal       Left Ear: Tympanic membrane, ear canal and external ear normal    Eyes:      Extraocular Movements: Extraocular movements intact  Conjunctiva/sclera: Conjunctivae normal       Pupils: Pupils are equal, round, and reactive to light  Neck:      Thyroid: No thyromegaly  Vascular: No carotid bruit or JVD  Cardiovascular:      Rate and Rhythm: Normal rate and regular rhythm  Pulses: Normal pulses  Heart sounds: No murmur heard  Pulmonary:      Effort: Pulmonary effort is normal       Breath sounds: Normal breath sounds  Abdominal:      General: There is no distension  Palpations: Abdomen is soft  There is no mass  Tenderness: There is no abdominal tenderness  Musculoskeletal:         General: Deformity (mild arthritic changes in hands) present  No swelling  Normal range of motion  Cervical back: Normal range of motion and neck supple  No muscular tenderness  Right lower leg: No edema  Left lower leg: No edema  Lymphadenopathy:      Cervical: No cervical adenopathy  Skin:     General: Skin is warm and dry  Capillary Refill: Capillary refill takes less than 2 seconds  Neurological:      Mental Status: She is alert and oriented to person, place, and time  Cranial Nerves: No cranial nerve deficit  Sensory: No sensory deficit  Motor: No weakness or abnormal muscle tone  Gait: Gait normal       Deep Tendon Reflexes: Reflexes are normal and symmetric  Comments: [employer]og 4/5   Psychiatric:         Mood and Affect: Mood normal          Behavior: Behavior normal          Thought Content:  Thought content normal          Judgment: Judgment normal       Comments: PHQ-9 Depression Screening    PHQ-9:   Frequency of the following problems over the past two weeks:      Little interest or pleasure in doing things: 0 - not at all  Feeling down, depressed, or hopeless: 1 - several days  Trouble falling or staying asleep, or sleeping too much: 0 - not at all  Feeling tired or having little energy: 0 - not at all  Poor appetite or overeatin - not at all  Feeling bad about yourself - or that you are a failure or have let   yourself or your family down: 0 - not at all  Trouble concentrating on things, such as reading the newspaper or watching   television: 0 - not at all  Moving or speaking so slowly that other people could have noticed   Or the   opposite - being so fidgety or restless that you have been moving around a   lot more than usual: 0 - not at all  Thoughts that you would be better off dead, or of hurting yourself in some   way: 0 - not at all  PHQ-2 Score: 1  PHQ-9 Score: 1

## 2021-06-11 ENCOUNTER — TELEPHONE (OUTPATIENT)
Dept: FAMILY MEDICINE CLINIC | Facility: CLINIC | Age: 76
End: 2021-06-11

## 2021-06-11 DIAGNOSIS — E11.9 TYPE 2 DIABETES MELLITUS WITHOUT COMPLICATION, WITHOUT LONG-TERM CURRENT USE OF INSULIN (HCC): ICD-10-CM

## 2021-06-11 DIAGNOSIS — E11.9 TYPE 2 DIABETES MELLITUS WITHOUT COMPLICATION, WITHOUT LONG-TERM CURRENT USE OF INSULIN (HCC): Primary | ICD-10-CM

## 2021-06-11 NOTE — TELEPHONE ENCOUNTER
----- Message from ASHLEE Baca sent at 10/30/2017  5:49 PM CDT -----  Mammogram negative recheck in 1 year or with changes in monthly SBE Patient called  She is asking if you could set her up with a dietician to help manage diabetes   She would prefer a [place] location

## 2021-06-11 NOTE — TELEPHONE ENCOUNTER
I put in the order - they should be contacting her   Please let me know if she does not hear from them by Wednesday

## 2021-06-13 NOTE — ASSESSMENT & PLAN NOTE
Depression Screening Follow-up Plan: Patient's depression screening was positive with a PHQ-2 score of 1  Their PHQ-9 score was 1  Patient assessed for underlying major depression  They have no active suicidal ideations  Brief counseling provided and recommend additional follow-up/re-evaluation next office visit    Recheck 6m

## 2021-06-13 NOTE — ASSESSMENT & PLAN NOTE
Lab Results   Component Value Date    HGBA1C 9 9 (A) 06/10/2021     Poor control  Urged diet compliance  Increase Prandin to 1mg with meals  Continue metformin   Recheck 3m

## 2021-06-14 DIAGNOSIS — E11.9 TYPE 2 DIABETES MELLITUS WITHOUT COMPLICATION, WITHOUT LONG-TERM CURRENT USE OF INSULIN (HCC): ICD-10-CM

## 2021-06-15 DIAGNOSIS — R63.4 ABNORMAL WEIGHT LOSS: ICD-10-CM

## 2021-06-15 DIAGNOSIS — E11.9 TYPE 2 DIABETES MELLITUS WITHOUT COMPLICATION, WITHOUT LONG-TERM CURRENT USE OF INSULIN (HCC): ICD-10-CM

## 2021-06-15 DIAGNOSIS — E11.8 TYPE 2 DIABETES MELLITUS WITH COMPLICATION, WITHOUT LONG-TERM CURRENT USE OF INSULIN (HCC): ICD-10-CM

## 2021-06-16 ENCOUNTER — TELEPHONE (OUTPATIENT)
Dept: FAMILY MEDICINE CLINIC | Facility: CLINIC | Age: 76
End: 2021-06-16

## 2021-06-16 NOTE — TELEPHONE ENCOUNTER
Call from pharmacy, you sent over test strips and lancets that have instructions use as directed  Medicare will not process as such  They need frequency of testing in order   Please resend with frequency

## 2021-06-24 ENCOUNTER — TELEPHONE (OUTPATIENT)
Dept: FAMILY MEDICINE CLINIC | Facility: CLINIC | Age: 76
End: 2021-06-24

## 2021-06-24 NOTE — TELEPHONE ENCOUNTER
Patient states she has 3 Hemorids she can feel coming out if her  very painful and itching  she tried OTC creams that are not helping her     They are not bleeding at all      Please advise none

## 2021-06-25 DIAGNOSIS — E11.9 TYPE 2 DIABETES MELLITUS WITHOUT COMPLICATION, WITHOUT LONG-TERM CURRENT USE OF INSULIN (HCC): ICD-10-CM

## 2021-06-25 RX ORDER — BLOOD SUGAR DIAGNOSTIC
STRIP MISCELLANEOUS
Qty: 50 EACH | Refills: 5 | Status: SHIPPED | OUTPATIENT
Start: 2021-06-25 | End: 2022-03-01 | Stop reason: SDUPTHER

## 2021-07-06 DIAGNOSIS — F41.9 ANXIETY: ICD-10-CM

## 2021-07-06 DIAGNOSIS — G25.81 RESTLESS LEG: ICD-10-CM

## 2021-07-06 NOTE — TELEPHONE ENCOUNTER
Per PDMP last fill 06/05/21 06/05/2021 1 06/03/2021   CHLORDIAZEPOXIDE 5 MG CAPSULE  60 0 30 [facility]   [id number]  THRIF (0326) 0  Med[place]are [place]    03/08/2021 1 03/03/2021   CHLORDIAZEPOXIDE 5 MG CAPSULE  180 0 90 [facility]   [id number]  THRIF (0314) 0  Comm Ins [place]    12/07/2020 1 12/04/2020   CHLORDIAZEPOXIDE 5 MG CAPSULE  180 0 90 [facility]   [id number]  THRIF (0314) 0  Comm Ins [place]

## 2021-07-07 RX ORDER — CHLORDIAZEPOXIDE HYDROCHLORIDE 5 MG/1
5 CAPSULE, GELATIN COATED ORAL 2 TIMES DAILY
Qty: 180 CAPSULE | Refills: 0 | Status: SHIPPED | OUTPATIENT
Start: 2021-07-07 | End: 2021-10-11

## 2021-07-07 RX ORDER — PRAMIPEXOLE DIHYDROCHLORIDE 0.12 MG/1
TABLET ORAL
Qty: 180 TABLET | Refills: 0 | Status: SHIPPED | OUTPATIENT
Start: 2021-07-07 | End: 2021-10-21 | Stop reason: SDUPTHER

## 2021-08-02 DIAGNOSIS — I10 ESSENTIAL HYPERTENSION: ICD-10-CM

## 2021-08-02 RX ORDER — ATENOLOL 50 MG/1
50 TABLET ORAL DAILY
Qty: 90 TABLET | Refills: 1 | Status: SHIPPED | OUTPATIENT
Start: 2021-08-02 | End: 2021-10-21 | Stop reason: SDUPTHER

## 2021-08-09 DIAGNOSIS — F41.9 ANXIETY: ICD-10-CM

## 2021-08-09 NOTE — TELEPHONE ENCOUNTER
Pharmacy is only dispensing 30 days at a time     07/07/2021 1 07/07/2021   CHLORDIAZEPOXIDE 5 MG CAPSULE  60 0 30 [facility]   [id number]  THRIF (0326) 0  Medicare [place]    06/05/2021 1 06/03/2021   CHLORDIAZEPOXIDE 5 MG CAPSULE  60 0 30 [facility]   [id number]  THRIF (0326) 0  Medicare [place]  03/08/2021 1 03/03/2021   CHLORDIAZEPOXIDE 5 MG CAPSULE  180 0 90 [facility]   [id number]  THRIF (0314) 0  Comm Ins [place]

## 2021-08-10 RX ORDER — CHLORDIAZEPOXIDE HYDROCHLORIDE 5 MG/1
5 CAPSULE, GELATIN COATED ORAL 2 TIMES DAILY
Qty: 180 CAPSULE | Refills: 0 | OUTPATIENT
Start: 2021-08-10

## 2021-09-28 DIAGNOSIS — E11.9 TYPE 2 DIABETES MELLITUS WITHOUT COMPLICATION, WITHOUT LONG-TERM CURRENT USE OF INSULIN (HCC): ICD-10-CM

## 2021-10-06 ENCOUNTER — TELEPHONE (OUTPATIENT)
Dept: FAMILY MEDICINE CLINIC | Facility: CLINIC | Age: 76
End: 2021-10-06

## 2021-10-06 DIAGNOSIS — K21.9 GASTROESOPHAGEAL REFLUX DISEASE, UNSPECIFIED WHETHER ESOPHAGITIS PRESENT: Primary | ICD-10-CM

## 2021-10-09 DIAGNOSIS — F41.9 ANXIETY: ICD-10-CM

## 2021-10-11 DIAGNOSIS — F41.9 ANXIETY: ICD-10-CM

## 2021-10-11 RX ORDER — CHLORDIAZEPOXIDE HYDROCHLORIDE 5 MG/1
5 CAPSULE, GELATIN COATED ORAL 2 TIMES DAILY
Qty: 180 CAPSULE | Refills: 0 | Status: SHIPPED | OUTPATIENT
Start: 2021-10-11 | End: 2021-10-11 | Stop reason: SDUPTHER

## 2021-10-11 RX ORDER — CHLORDIAZEPOXIDE HYDROCHLORIDE 5 MG/1
5 CAPSULE, GELATIN COATED ORAL 2 TIMES DAILY
Qty: 180 CAPSULE | Refills: 0 | Status: SHIPPED | OUTPATIENT
Start: 2021-10-11 | End: 2021-10-21 | Stop reason: SDUPTHER

## 2021-10-21 DIAGNOSIS — K21.9 GASTROESOPHAGEAL REFLUX DISEASE: ICD-10-CM

## 2021-10-21 DIAGNOSIS — I10 ESSENTIAL HYPERTENSION: ICD-10-CM

## 2021-10-21 DIAGNOSIS — G25.81 RESTLESS LEG: ICD-10-CM

## 2021-10-21 DIAGNOSIS — F41.9 ANXIETY: ICD-10-CM

## 2021-10-21 DIAGNOSIS — E11.9 TYPE 2 DIABETES MELLITUS WITHOUT COMPLICATION, WITHOUT LONG-TERM CURRENT USE OF INSULIN (HCC): ICD-10-CM

## 2021-10-22 RX ORDER — REPAGLINIDE 1 MG/1
1 TABLET ORAL
Qty: 270 TABLET | Refills: 0 | Status: SHIPPED | OUTPATIENT
Start: 2021-10-22 | End: 2021-11-02 | Stop reason: SDUPTHER

## 2021-10-22 RX ORDER — AMLODIPINE BESYLATE 5 MG/1
5 TABLET ORAL DAILY
Qty: 90 TABLET | Refills: 3 | Status: SHIPPED | OUTPATIENT
Start: 2021-10-22 | End: 2022-01-04 | Stop reason: SDUPTHER

## 2021-10-22 RX ORDER — PANTOPRAZOLE SODIUM 20 MG/1
20 TABLET, DELAYED RELEASE ORAL DAILY
Qty: 90 TABLET | Refills: 1 | Status: SHIPPED | OUTPATIENT
Start: 2021-10-22 | End: 2022-03-01

## 2021-10-22 RX ORDER — PRAMIPEXOLE DIHYDROCHLORIDE 0.12 MG/1
TABLET ORAL
Qty: 180 TABLET | Refills: 0 | Status: SHIPPED | OUTPATIENT
Start: 2021-10-22 | End: 2021-12-15 | Stop reason: SDUPTHER

## 2021-10-22 RX ORDER — CHLORDIAZEPOXIDE HYDROCHLORIDE 5 MG/1
5 CAPSULE, GELATIN COATED ORAL 2 TIMES DAILY
Qty: 180 CAPSULE | Refills: 0 | Status: SHIPPED | OUTPATIENT
Start: 2021-10-22 | End: 2021-11-05 | Stop reason: SDUPTHER

## 2021-10-22 RX ORDER — IMIPRAMINE HCL 25 MG
25 TABLET ORAL 2 TIMES DAILY
Qty: 180 TABLET | Refills: 3 | Status: SHIPPED | OUTPATIENT
Start: 2021-10-22

## 2021-10-22 RX ORDER — ATENOLOL 50 MG/1
50 TABLET ORAL DAILY
Qty: 90 TABLET | Refills: 1 | Status: SHIPPED | OUTPATIENT
Start: 2021-10-22 | End: 2021-11-05 | Stop reason: SDUPTHER

## 2021-11-02 DIAGNOSIS — E11.9 TYPE 2 DIABETES MELLITUS WITHOUT COMPLICATION, WITHOUT LONG-TERM CURRENT USE OF INSULIN (HCC): ICD-10-CM

## 2021-11-02 RX ORDER — REPAGLINIDE 1 MG/1
1 TABLET ORAL
Qty: 270 TABLET | Refills: 0 | Status: SHIPPED | OUTPATIENT
Start: 2021-11-02 | End: 2022-03-01 | Stop reason: ALTCHOICE

## 2021-11-05 DIAGNOSIS — F41.9 ANXIETY: ICD-10-CM

## 2021-11-05 DIAGNOSIS — I10 ESSENTIAL HYPERTENSION: ICD-10-CM

## 2021-11-05 RX ORDER — CHLORDIAZEPOXIDE HYDROCHLORIDE 5 MG/1
5 CAPSULE, GELATIN COATED ORAL 2 TIMES DAILY
Qty: 180 CAPSULE | Refills: 0 | Status: SHIPPED | OUTPATIENT
Start: 2021-11-09 | End: 2021-12-09 | Stop reason: SDUPTHER

## 2021-11-05 RX ORDER — ATENOLOL 50 MG/1
50 TABLET ORAL DAILY
Qty: 90 TABLET | Refills: 1 | Status: SHIPPED | OUTPATIENT
Start: 2021-11-05 | End: 2022-03-01

## 2021-11-11 ENCOUNTER — TELEPHONE (OUTPATIENT)
Dept: FAMILY MEDICINE CLINIC | Facility: CLINIC | Age: 76
End: 2021-11-11

## 2021-12-09 DIAGNOSIS — F41.9 ANXIETY: ICD-10-CM

## 2021-12-10 RX ORDER — CHLORDIAZEPOXIDE HYDROCHLORIDE 5 MG/1
5 CAPSULE, GELATIN COATED ORAL 2 TIMES DAILY
Qty: 60 CAPSULE | Refills: 0 | Status: SHIPPED | OUTPATIENT
Start: 2021-12-10 | End: 2022-01-07 | Stop reason: SDUPTHER

## 2021-12-15 DIAGNOSIS — G25.81 RESTLESS LEG: ICD-10-CM

## 2021-12-15 RX ORDER — PRAMIPEXOLE DIHYDROCHLORIDE 0.12 MG/1
TABLET ORAL
Qty: 180 TABLET | Refills: 0 | Status: SHIPPED | OUTPATIENT
Start: 2021-12-15 | End: 2022-02-24 | Stop reason: SDUPTHER

## 2022-01-03 ENCOUNTER — TELEPHONE (OUTPATIENT)
Dept: FAMILY MEDICINE CLINIC | Facility: CLINIC | Age: 77
End: 2022-01-03

## 2022-01-03 DIAGNOSIS — E11.9 TYPE 2 DIABETES MELLITUS WITHOUT COMPLICATION, WITHOUT LONG-TERM CURRENT USE OF INSULIN (HCC): ICD-10-CM

## 2022-01-03 NOTE — TELEPHONE ENCOUNTER
Pt is asking if you could please call in to [facility] a cough syrup? Informed pt if she did not hear back from us, to check with her pharmacy later

## 2022-01-03 NOTE — TELEPHONE ENCOUNTER
Can you order a covid test,  Her partner is positive,  She started Friday with clear stuffy nose, dry cough, had chills the other day,  She is vaccinated

## 2022-01-04 DIAGNOSIS — I10 ESSENTIAL HYPERTENSION: ICD-10-CM

## 2022-01-04 RX ORDER — AMLODIPINE BESYLATE 5 MG/1
5 TABLET ORAL DAILY
Qty: 90 TABLET | Refills: 3 | Status: SHIPPED | OUTPATIENT
Start: 2022-01-04

## 2022-01-04 NOTE — TELEPHONE ENCOUNTER
Pt went for the covid test, she is asking for a 5 day antibiotic sent in, [facility]

## 2022-01-07 DIAGNOSIS — F41.9 ANXIETY: ICD-10-CM

## 2022-01-07 RX ORDER — CHLORDIAZEPOXIDE HYDROCHLORIDE 5 MG/1
5 CAPSULE, GELATIN COATED ORAL 2 TIMES DAILY
Qty: 60 CAPSULE | Refills: 0 | Status: SHIPPED | OUTPATIENT
Start: 2022-01-07 | End: 2022-03-01 | Stop reason: SDUPTHER

## 2022-01-07 NOTE — TELEPHONE ENCOUNTER
12/10/2021  1  12/10/2021  CHLORDIAZEPOXIDE 5 MG CAPSULE  60 0  30  [facility]  [id number]  THRIF (0326)  0   Med[place]are  [place]    11/08/2021  1  11/05/2021  CHLORDIAZEPOXIDE 5 MG CAPSULE  60 0  30  [facility]  [id number]  THRIF (0326)  0   Medicare  [place]    10/11/2021  1  10/11/2021  CHLORDIAZEPOXIDE 5 MG CAPSULE  60 0  30  [facility]  [id number]  THRIF (0326)  0   Medicare  [place]

## 2022-01-18 ENCOUNTER — TELEPHONE (OUTPATIENT)
Dept: FAMILY MEDICINE CLINIC | Facility: CLINIC | Age: 77
End: 2022-01-18

## 2022-01-18 NOTE — TELEPHONE ENCOUNTER
Patient called and would like a call back   Patient stated she needs open heart surgery and she would like to speak with you

## 2022-01-18 NOTE — TELEPHONE ENCOUNTER
Pt with recent finding of significant CAD on cath  Pt still in [facility] and is to see a CT surgeon today to discuss CABG  I explained to pt that lab result, cath results and notes are not available in Care everywhere, so I am not able to give an educated opinion re: present plan  However, I explained that if her cardiologist felt that the blockages were significant and not amenable to PTCA/stent, she should proceed with the open procedure

## 2022-01-20 NOTE — TELEPHONE ENCOUNTER
Jenny:  She is still at [address] and they are keeping her until her surgery, they tell her the surgery will be maybe Tuesday

## 2022-02-24 ENCOUNTER — TRANSITIONAL CARE MANAGEMENT (OUTPATIENT)
Dept: FAMILY MEDICINE CLINIC | Facility: CLINIC | Age: 77
End: 2022-02-24

## 2022-02-24 ENCOUNTER — TELEPHONE (OUTPATIENT)
Dept: FAMILY MEDICINE CLINIC | Facility: CLINIC | Age: 77
End: 2022-02-24

## 2022-02-24 DIAGNOSIS — G25.81 RESTLESS LEG: ICD-10-CM

## 2022-02-24 RX ORDER — PRAMIPEXOLE DIHYDROCHLORIDE 0.12 MG/1
TABLET ORAL
Qty: 180 TABLET | Refills: 0 | Status: SHIPPED | OUTPATIENT
Start: 2022-02-24 | End: 2022-04-28 | Stop reason: SDUPTHER

## 2022-02-24 NOTE — TELEPHONE ENCOUNTER
Is she taking all of her other meds? "Not comfortable" from restless leg? Is she taking the Mirapex ( pramipexole)?

## 2022-02-24 NOTE — TELEPHONE ENCOUNTER
Patient is taking all medication  She aware Mirapex was sent to pharmacy today   Patient requesting to take Tylenol PM

## 2022-02-24 NOTE — TELEPHONE ENCOUNTER
Patient is having a lot of trouble sleeping because she can't get comfortable    She would like to know if she can take Tylenol PM to help her sleep

## 2022-02-24 NOTE — TELEPHONE ENCOUNTER
I want her to take the Mirapex first  The tylenol PM combined with the other meds may slow her resp rate, or cause cognitive changes

## 2022-02-25 ENCOUNTER — TELEPHONE (OUTPATIENT)
Dept: FAMILY MEDICINE CLINIC | Facility: CLINIC | Age: 77
End: 2022-02-25

## 2022-02-25 NOTE — TELEPHONE ENCOUNTER
Patient called and stated from her bandages from surgery she has sticky residue all over her body   Patient would like to know how to remove this

## 2022-03-01 ENCOUNTER — OFFICE VISIT (OUTPATIENT)
Dept: FAMILY MEDICINE CLINIC | Facility: CLINIC | Age: 77
End: 2022-03-01
Payer: MEDICARE

## 2022-03-01 VITALS
HEIGHT: 60 IN | HEART RATE: 74 BPM | WEIGHT: 130 LBS | BODY MASS INDEX: 25.52 KG/M2 | TEMPERATURE: 98.7 F | DIASTOLIC BLOOD PRESSURE: 78 MMHG | SYSTOLIC BLOOD PRESSURE: 120 MMHG

## 2022-03-01 DIAGNOSIS — J04.0 LARYNGITIS: ICD-10-CM

## 2022-03-01 DIAGNOSIS — E11.9 TYPE 2 DIABETES MELLITUS WITHOUT COMPLICATION, WITHOUT LONG-TERM CURRENT USE OF INSULIN (HCC): Primary | ICD-10-CM

## 2022-03-01 DIAGNOSIS — J90 PLEURAL EFFUSION, LEFT: ICD-10-CM

## 2022-03-01 DIAGNOSIS — I25.10 3-VESSEL CAD: ICD-10-CM

## 2022-03-01 DIAGNOSIS — J02.9 PHARYNGITIS, UNSPECIFIED ETIOLOGY: ICD-10-CM

## 2022-03-01 DIAGNOSIS — Z79.4 TYPE 2 DIABETES MELLITUS WITHOUT COMPLICATION, WITH LONG-TERM CURRENT USE OF INSULIN (HCC): ICD-10-CM

## 2022-03-01 DIAGNOSIS — R53.83 FATIGUE, UNSPECIFIED TYPE: ICD-10-CM

## 2022-03-01 DIAGNOSIS — F41.9 ANXIETY: ICD-10-CM

## 2022-03-01 DIAGNOSIS — R35.1 NOCTURIA: ICD-10-CM

## 2022-03-01 DIAGNOSIS — Z76.89 ENCOUNTER FOR SUPPORT AND COORDINATION OF TRANSITION OF CARE: Primary | ICD-10-CM

## 2022-03-01 DIAGNOSIS — E11.9 TYPE 2 DIABETES MELLITUS WITHOUT COMPLICATION, WITH LONG-TERM CURRENT USE OF INSULIN (HCC): ICD-10-CM

## 2022-03-01 DIAGNOSIS — E11.9 TYPE 2 DIABETES MELLITUS WITHOUT COMPLICATION, WITHOUT LONG-TERM CURRENT USE OF INSULIN (HCC): ICD-10-CM

## 2022-03-01 DIAGNOSIS — R35.0 URINE FREQUENCY: ICD-10-CM

## 2022-03-01 DIAGNOSIS — Z95.1 S/P CABG (CORONARY ARTERY BYPASS GRAFT): ICD-10-CM

## 2022-03-01 DIAGNOSIS — I10 PRIMARY HYPERTENSION: ICD-10-CM

## 2022-03-01 DIAGNOSIS — I63.9 CEREBROVASCULAR ACCIDENT (CVA), UNSPECIFIED MECHANISM (HCC): ICD-10-CM

## 2022-03-01 PROBLEM — R57.0 CARDIOGENIC SHOCK (HCC): Status: ACTIVE | Noted: 2022-01-27

## 2022-03-01 PROBLEM — I42.9 MYOCARDIOPATHY (HCC): Status: ACTIVE | Noted: 2021-02-23

## 2022-03-01 PROBLEM — D62 ACUTE BLOOD LOSS ANEMIA: Status: ACTIVE | Noted: 2022-01-27

## 2022-03-01 PROBLEM — G93.41 ACUTE METABOLIC ENCEPHALOPATHY: Status: ACTIVE | Noted: 2022-01-29

## 2022-03-01 PROBLEM — R13.10 DYSPHAGIA: Status: ACTIVE | Noted: 2022-02-06

## 2022-03-01 PROBLEM — J96.01 ACUTE HYPOXEMIC RESPIRATORY FAILURE (HCC): Status: ACTIVE | Noted: 2022-01-28

## 2022-03-01 LAB
SL AMB  POCT GLUCOSE, UA: ABNORMAL
SL AMB LEUKOCYTE ESTERASE,UA: ABNORMAL
SL AMB POCT BILIRUBIN,UA: ABNORMAL
SL AMB POCT BLOOD,UA: ABNORMAL
SL AMB POCT CLARITY,UA: ABNORMAL
SL AMB POCT COLOR,UA: YELLOW
SL AMB POCT KETONES,UA: ABNORMAL
SL AMB POCT NITRITE,UA: ABNORMAL
SL AMB POCT PH,UA: 6
SL AMB POCT SPECIFIC GRAVITY,UA: 1.02
SL AMB POCT URINE PROTEIN: ABNORMAL
SL AMB POCT UROBILINOGEN: ABNORMAL

## 2022-03-01 PROCEDURE — 87186 SC STD MICRODIL/AGAR DIL: CPT | Performed by: FAMILY MEDICINE

## 2022-03-01 PROCEDURE — 99496 TRANSJ CARE MGMT HIGH F2F 7D: CPT | Performed by: FAMILY MEDICINE

## 2022-03-01 PROCEDURE — 87077 CULTURE AEROBIC IDENTIFY: CPT | Performed by: FAMILY MEDICINE

## 2022-03-01 PROCEDURE — 81002 URINALYSIS NONAUTO W/O SCOPE: CPT | Performed by: FAMILY MEDICINE

## 2022-03-01 PROCEDURE — 1111F DSCHRG MED/CURRENT MED MERGE: CPT | Performed by: FAMILY MEDICINE

## 2022-03-01 PROCEDURE — 87086 URINE CULTURE/COLONY COUNT: CPT | Performed by: FAMILY MEDICINE

## 2022-03-01 RX ORDER — SULFAMETHOXAZOLE AND TRIMETHOPRIM 800; 160 MG/1; MG/1
1 TABLET ORAL EVERY 12 HOURS SCHEDULED
Qty: 10 TABLET | Refills: 0 | Status: SHIPPED | OUTPATIENT
Start: 2022-03-01 | End: 2022-03-06

## 2022-03-01 RX ORDER — NITROGLYCERIN 0.4 MG/1
1 TABLET SUBLINGUAL
COMMUNITY
Start: 2022-02-22 | End: 2022-03-24

## 2022-03-01 RX ORDER — INSULIN GLARGINE 100 [IU]/ML
30 INJECTION, SOLUTION SUBCUTANEOUS DAILY
COMMUNITY
Start: 2022-02-22

## 2022-03-01 RX ORDER — PANTOPRAZOLE SODIUM 40 MG/1
TABLET, DELAYED RELEASE ORAL
COMMUNITY
Start: 2021-12-15

## 2022-03-01 RX ORDER — BLOOD SUGAR DIAGNOSTIC
STRIP MISCELLANEOUS
Qty: 100 EACH | Refills: 5 | Status: SHIPPED | OUTPATIENT
Start: 2022-03-01

## 2022-03-01 RX ORDER — ATENOLOL 25 MG/1
25 TABLET ORAL DAILY
COMMUNITY
Start: 2022-02-22 | End: 2022-03-18 | Stop reason: SDUPTHER

## 2022-03-01 RX ORDER — LANCETS
EACH MISCELLANEOUS
Qty: 100 EACH | Refills: 5 | Status: SHIPPED | OUTPATIENT
Start: 2022-03-01 | End: 2022-03-09 | Stop reason: SDUPTHER

## 2022-03-01 RX ORDER — PEN NEEDLE, DIABETIC 32GX 5/32"
NEEDLE, DISPOSABLE MISCELLANEOUS
COMMUNITY
Start: 2022-02-22

## 2022-03-01 RX ORDER — CHLORDIAZEPOXIDE HYDROCHLORIDE 5 MG/1
5 CAPSULE, GELATIN COATED ORAL 2 TIMES DAILY
Qty: 60 CAPSULE | Refills: 0 | Status: SHIPPED | OUTPATIENT
Start: 2022-03-01 | End: 2022-04-04 | Stop reason: SDUPTHER

## 2022-03-01 RX ORDER — AMIODARONE HYDROCHLORIDE 200 MG/1
200 TABLET ORAL DAILY
COMMUNITY
Start: 2022-02-23 | End: 2022-03-01 | Stop reason: ALTCHOICE

## 2022-03-01 NOTE — PROGRESS NOTES
Assessment/Plan:    No problem-specific Assessment & Plan notes found for this encounter  Diagnoses and all orders for this visit:    Encounter for support and coordination of transition of care    3-vessel CAD    S/P CABG (coronary artery bypass graft)    Cerebrovascular accident (CVA), unspecified mechanism ([facility] )  -     CBC and differential; Future  -     Comprehensive metabolic panel; Future    Pleural effusion, left  -     CBC and differential; Future  -     Comprehensive metabolic panel; Future    Urine frequency  -     POCT urine dip  -     Urine culture    Nocturia    Pharyngitis, unspecified etiology  -     sulfamethoxazole-trimethoprim (BACTRIM DS) 800-160 mg per tablet; Take 1 tablet by mouth every 12 (twelve) hours for 5 days  -     Ambulatory Referral to Otolaryngology; Future    Laryngitis  -     sulfamethoxazole-trimethoprim (BACTRIM DS) 800-160 mg per tablet; Take 1 tablet by mouth every 12 (twelve) hours for 5 days  -     Ambulatory Referral to Otolaryngology; Future    Type 2 diabetes mellitus without complication, without long-term current use of insulin ([facility])  -     Lancets    Primary hypertension    Type 2 diabetes mellitus without complication, with long-term current use of insulin ([facility])  -     glucose blood (Contour Next Test) test strip; Patient tests blood sugars twice daily    Fatigue, unspecified type  -     CBC and differential; Future  -     Comprehensive metabolic panel; Future    Anxiety  -     chlordiazePOXIDE (LIBRIUM) 5 mg capsule; Take 1 capsule (5 mg total) by mouth 2 (two) times a day    Other orders  -     atenolol (TENORMIN) 25 mg tablet;  Take 25 mg by mouth daily  -     pantoprazole (PROTONIX) 40 mg tablet; take 1 tablet by mouth twice a day 30 TO 60 MINUTES BEFORE BREAKFAST AND DINNER  -     nitroglycerin (NITROSTAT) 0 4 mg SL tablet; Place 1 tablet under the tongue every 5 (five) minutes as needed  -     insulin glargine (LANTUS) 100 units/mL subcutaneous injection; Inject 30 Units under the skin daily  -     Discontinue: amiodarone 200 mg tablet; Take 200 mg by mouth daily  -     Droplet Pen Needles 32G X 4 MM MISC; USE ONCE DAILY FOR INSULIN INJECTION        Discussion:  I reviewed all with patient and significant other   - patient is slowly improving status post CABG x4  Patient with multiple complications in the postop -all which seem to be improving  Patient has mild edema in the legs which may be related to saphenous vein harvesting  No calf tenderness or other signs of DVT appreciated  No JVD noted as well  Patient will continue on present medications, follow-up with Cardiology as well as CT surgery as directed  She will follow-up in this office in 3 weeks  - regards to her nocturia, patient did have white blood cells in the urine  We will send urine for a culture  Will start patient on Bactrim DS in the interim  Recheck by phone in 3 days if not improving  -in regards to her sore throat as well as laryngitis, exam was relatively unremarkable  Given that intubation was over 3 weeks ago, I will refer patient to ENT to evaluate her vocal cords  Warm saltwater gargles, voice rest and other conservative measures also advised  Recheck 1 week if not improving  -in regards to her CVA, left sided weakness appears to be transient  I did not appreciate any significant asymmetric weakness or numbness on exam   Strongly recommend the patient continue her home exercises  Recheck 3 weeks-earlier if symptoms should worsen  - in regards to her diabetes, patient is now off of metformin  She denies hyperglycemia or hypoglycemic episodes well at home  Will continue to monitor for now  Recheck 3 weeks  - patient will follow-up with me as above-earlier if symptoms should worsen    Patient call for problems or concerns in the interim      Subjective:     TCM Call (since 1/30/2022)     Date and time call was made  2/24/2022  4:15 PM    Hospital care reviewed  Records reviewed Patient was hospitialized at  [facility]        Date of Admission  02/15/22    Date of discharge  02/22/22    Diagnosis   CVA (cerebral vascular accident    Disposition  Home    Were the patients medications reviewed and updated  Yes    Current Symptoms  Weakness; Cough    Cough Severity  Mild    Weakness severity  Severe      TCM Call (since 1/30/2022)     Scheduled for follow up? Yes    Did you obtain your prescribed medications  No    Why were you unable to obtain your medications  going to doctor tomorrow    Do you need help managing your prescriptions or medications  No    Is transportation to your appointment needed  No    I have advised the patient to call PCP with any new or worsening symptoms  [address] or [address] other    Are you recieving any outpatient services  No    Are you recieving home care services  Yes    Types of home care services  Nurse visit    Are you using any community resources  No    Current waiver services  No    Have you fallen in the last 12 months  No    Interperter language line needed  No           Patient ID: Marva Ortiz is a 68 y o  female  Pt here for TCM visit  - 69 yo female was admitted to [facility] on 01/17/2022 for cardiac catheterization after having an abnormal stress Myoview done on 01/11/2022  Cardiac catheterization revealed multiple blockages including but not limited to 60% stenosis of the left main, 95% stenosis of the distal left main into the proximal LAD, 75 percent stenosis of the mid LAD, 95 percent stenosis of the left circ, an 80 percent stenosis of right coronary artery  Because of the severe left main into LAD disease as well as multiple other stenosis, plan for CABG x4 was done  Preop evaluation was completed however patient tested positive for COVID and surgery was delayed  Finally, patient underwent CABG x4 on 01/27/2022    Patient had a complicated postop course including difficulty following commands on postop day 1 which was found to be due to subdural bleed  Medications adjusted and patient was continued on the vent  She improved slowly was able to be extubated on 02/03/2022 (pod 7 )  After extubation, patient failed swallowing studies and a Dobbhoff tube was placed for nutrition  Patient had an improved swallowing study after 4-5 days  Dobbhoff was removed on 02/11/2022 after became clogged  Patient tolerated p o  At that time  Chest x-ray done on 02/12 22 showed left pleural effusion with his relevance of pulmonary edema  Patient was given Lasix  She underwent thoracentesis on 02/14/2022 and did well  Because of prolonged hospitalization, patient was recommended for rehab placement  Patient was discharged to rehab on 02/15/2022  Patient had some left-sided weakness initially that improved during hospitalization and rehab stay  By 2/14, patient states that she was feeling better and was able to be discharged to home  Patient here for transitional care visit  - since returning home, patient states that she has had multiple difficulties  - overall the last 6d, patient has noted significant sore throat with swallowing  She had some laryngitis postop as well as swallowing difficulties, both of which have resolved  Patient denies any fever/chills, nasal congestion, rashes, or sinus pain  - patient has been trying to monitor blood sugars since discharge  She denies episodes of hypo or hyperglycemia  - repeat CT done in the hospital did show some improvement of her subdural, and her left-sided weakness did improve  She denies any HA, change in vision, asymmetric weakness/numbness, or other neurologic symptoms     - has some R chest pain with movement  Occ SOB  Still very fatigued  (+) constipation (since stopping metformin)  Patient states that she has been waking up 4x a night to urinate    She denies any dysuria but did have a UTI well she was in the hospital   She had urinary frequency with that as well  - patient is scheduled to follow-up with all of her specialists  - I reviewed available hospital records, lab results and study reports with pt            The following portions of the patient's history were reviewed and updated as appropriate:   She  has a past medical history of Gastroenteritis  She   Patient Active Problem List    Diagnosis Date Noted    CVA (cerebral vascular accident) ([facility] ) 02/15/2022    Pleural effusion, left 02/13/2022    Dysphagia 02/06/2022    Encephalopathy 01/29/2022    [facility] hypox[facility] ) 01/28/2022    Acute blood loss anemia 01/27/2022    Cardiogenic shock ([place] [address] ) 01/27/2022    S/P CABG (coronary artery bypass graft) 01/27/2022    3-vessel CAD 01/17/2022    Myocardiopathy ([facility] ) 02/23/2021    Carotid stenosis, right 06/16/2020    Heart block AV complete ([place] [address] ) 06/15/2020    Health care maintenance 09/26/2018    Neck pain 06/06/2017    Recurrent major depressive disorder, in partial remission ([facility] ) 05/10/2017    Pacemaker 09/25/2015    Tobacco abuse 09/17/2015    Heart block AV second degree 09/16/2015    Elevated ALT measurement 06/17/2013    Elevated AST (SGOT) 06/17/2013    Mixed hyperlipidemia 06/17/2013    Type 2 diabetes mellitus without complication, without long-term current use of insulin ([facility] ) 06/17/2013    Anxiety disorder 06/10/2013    Hypertension 09/27/2012     She  has a past surgical history that includes Atrial cardiac pacemaker insertion  She  reports that she has quit smoking  She has never used smokeless tobacco  She reports previous alcohol use  She reports that she does not use drugs    Current Outpatient Medications   Medication Sig Dispense Refill    insulin glargine (LANTUS) 100 units/mL subcutaneous injection Inject 30 Units under the skin daily      nitroglycerin (NITROSTAT) 0 4 mg SL tablet Place 1 tablet under the tongue every 5 (five) minutes as needed      amLODIPine (NORVASC) 5 mg tablet Take 1 tablet (5 mg total) by mouth daily 90 tablet 3    ascorbic acid (VITAMIN C) 500 mg tablet Take by mouth daily      atenolol (TENORMIN) 25 mg tablet Take 25 mg by mouth daily      betamethasone, augmented, (DIPROLENE) 0 05 % ointment APPLYTWICE DAILY TO AFFECTED AREA ON LEG FOR 2 WEEKS THEN REDUCE      (REFER TO PRESCRIPTION NOTES)   Blood Glucose Monitoring Suppl (Contour Next Monitor) w/Device KIT Use bid as directed 1 kit 1    chlordiazePOXIDE (LIBRIUM) 5 mg capsule Take 1 capsule (5 mg total) by mouth 2 (two) times a day 60 capsule 0    Droplet Pen Needles 32G X 4 MM MISC USE ONCE DAILY FOR INSULIN INJECTION      fluticasone (FLONASE) 50 mcg/act nasal spray 2 sprays into each nostril daily 2 sprays bilat qd 1 Bottle 2    glucose blood (Contour Next Test) test strip Patient tests blood sugars twice daily 100 each 5    hydrocortisone 2 5 % cream MIX PEA SIZED DOSE WITH KETOCONAZOLE CREAM AND APPLY UNDER BREAST     (REFER TO PRESCRIPTION NOTES)        hydrocortisone-pramoxine (PROCTOFOAM-HC) 1-1 % FOAM rectal foam Insert 1 applicator into the rectum 2 (two) times a day 20 g 1    imipramine (TOFRANIL) 25 mg tablet Take 1 tablet (25 mg total) by mouth 2 (two) times a day 180 tablet 3    ketoconazole (NIZORAL) 2 % cream APPLY TOPICALLY TWICE DAILY UNTIL RASH CLEARS UNDER BREASTS      losartan (COZAAR) 25 mg tablet Take 25 mg by mouth 2 (two) times a day      Microlet Lancets [facility] Patient tests blood glucose twice daily 100 each 5    pantoprazole (PROTONIX) 40 mg tablet take 1 tablet by mouth twice a day 30 TO 60 MINUTES BEFORE BREAKFAST AND DINNER      pramipexole (MIRAPEX) 0 125 mg tablet take 1 to 2 tablets 1 TO 2 HOURS BEFORE BEDTIME 180 tablet 0    Promethazine-DM (PHENERGAN-DM) 6 25-15 mg/5 mL oral syrup Take 5 mL by mouth 4 (four) times a day as needed for cough 150 mL 0    RA Aspirin EC 81 MG EC tablet take 1 tablet by mouth once daily 90 tablet 0    sulfamethoxazole-trimethoprim (BACTRIM DS) 800-160 mg per tablet Take 1 tablet by mouth every 12 (twelve) hours for 5 days 10 tablet 0    triamcinolone (KENALOG) 0 1 % cream Apply topically 2 (two) times a day 30 g 0    vitamin E 100 UNIT capsule Take 800 Units by mouth      Vitamin E 400 units TABS Take 2 tablets by mouth daily       No current facility-administered medications for this visit  She is allergic to atorvastatin, clarithromycin, hydrogen peroxide, levofloxacin, other, penicillins, repaglinide, and rosuvastatin       Review of Systems   Constitutional: Positive for activity change and fatigue  Negative for chills and fever  HENT: Positive for congestion (mild), sore throat, trouble swallowing and voice change  Negative for sinus pressure and sinus pain  Eyes: Negative  Respiratory: Positive for cough and shortness of breath (mild)  Negative for wheezing  Cardiovascular: Positive for chest pain (R low sternal) and leg swelling (mild)  Negative for palpitations  Gastrointestinal: Positive for constipation  Negative for abdominal distention, abdominal pain, diarrhea, nausea and vomiting  Genitourinary: Positive for frequency  Negative for difficulty urinating, dysuria, flank pain, hematuria, pelvic pain and urgency  Nocturia   Musculoskeletal: Positive for arthralgias, gait problem and myalgias  Negative for joint swelling  Skin: Negative  Neurological: Negative for dizziness, facial asymmetry, weakness (?mild L sided - improved), light-headedness, numbness and headaches  Psychiatric/Behavioral: Positive for dysphoric mood (secondary to multiple medical issues) and sleep disturbance (due to nocturia)  Negative for hallucinations and suicidal ideas  Objective:      /78   Pulse 74   Temp 98 7 °F (37 1 °C)   Ht 5' (1 524 m)   Wt 59 kg (130 lb)   BMI 25 39 kg/m²          Physical Exam  Vitals reviewed     Constitutional:       Appearance: She is well-developed  She is not diaphoretic  Comments: Tired appearing female in NAD   HENT:      Head: Normocephalic and atraumatic  Right Ear: Tympanic membrane, ear canal and external ear normal       Left Ear: Tympanic membrane, ear canal and external ear normal       Nose: Congestion (mild) present  Mouth/Throat:      Mouth: Mucous membranes are moist       Pharynx: No oropharyngeal exudate or posterior oropharyngeal erythema  Eyes:      Extraocular Movements: Extraocular movements intact  Conjunctiva/sclera: Conjunctivae normal       Pupils: Pupils are equal, round, and reactive to light  Neck:      Thyroid: No thyromegaly  Vascular: No JVD  Comments: No JVD appreciated  Cardiovascular:      Rate and Rhythm: Normal rate and regular rhythm  Pulses: Normal pulses  Heart sounds: Murmur heard  No gallop  Pulmonary:      Effort: Pulmonary effort is normal       Breath sounds: Rales (scattered bibasilar, L >R (slight)) present  Chest:      Chest wall: Tenderness (tender over the R low sternum  ) present  Abdominal:      General: There is no distension  Palpations: Abdomen is soft  There is no mass  Tenderness: There is no abdominal tenderness  There is no right CVA tenderness or left CVA tenderness  Musculoskeletal:         General: Deformity (diffuse osteoarthritic changes) present  No swelling  Normal range of motion  Cervical back: Normal range of motion and neck supple  No tenderness  No muscular tenderness  Right lower leg: Edema (trace) present  Left lower leg: Edema (trace) present  Lymphadenopathy:      Cervical: No cervical adenopathy  Skin:     General: Skin is warm and dry  Capillary Refill: Capillary refill takes less than 2 seconds  Comments: Sternotomy and chest tube sites healing well without erythema, dehiscence or discharge   Neurological:      Mental Status: She is alert and oriented to person, place, and time  Cranial Nerves: No cranial nerve deficit  Sensory: No sensory deficit  Motor: No weakness or abnormal muscle tone  Gait: Gait abnormal (mildly antalgic appearing gait)  Deep Tendon Reflexes: Reflexes are normal and symmetric  Reflexes normal    Psychiatric:         Behavior: Behavior normal          Thought Content:  Thought content normal          Judgment: Judgment normal

## 2022-03-02 PROBLEM — G93.40 ENCEPHALOPATHY: Status: ACTIVE | Noted: 2022-01-29

## 2022-03-03 ENCOUNTER — TELEPHONE (OUTPATIENT)
Dept: FAMILY MEDICINE CLINIC | Facility: CLINIC | Age: 77
End: 2022-03-03

## 2022-03-03 LAB
BACTERIA UR CULT: ABNORMAL
BACTERIA UR CULT: ABNORMAL

## 2022-03-04 ENCOUNTER — TELEPHONE (OUTPATIENT)
Dept: FAMILY MEDICINE CLINIC | Facility: CLINIC | Age: 77
End: 2022-03-04

## 2022-03-04 NOTE — TELEPHONE ENCOUNTER
The ultrasound is now in the computer if she wants it performed at [facility] than she will have to come to office to  the script

## 2022-03-04 NOTE — TELEPHONE ENCOUNTER
Pt called stating you were going to order an ultrasound for her  Pt called the central scheduling number and was told there was no order in the computer for an ultrasound  Pt states she will be going to [facility] to have it done

## 2022-03-04 NOTE — TELEPHONE ENCOUNTER
Central Scheduling called patient called to schedule ultrasound  Did you want her to have an ultrasound? If so please place orders

## 2022-03-07 ENCOUNTER — TELEPHONE (OUTPATIENT)
Dept: FAMILY MEDICINE CLINIC | Facility: CLINIC | Age: 77
End: 2022-03-07

## 2022-03-07 NOTE — TELEPHONE ENCOUNTER
Milagros Claude from [facility] called and would like the script for the Liver Function Test for this patient faxed to   [phone]

## 2022-03-09 ENCOUNTER — TELEPHONE (OUTPATIENT)
Dept: FAMILY MEDICINE CLINIC | Facility: CLINIC | Age: 77
End: 2022-03-09

## 2022-03-09 NOTE — TELEPHONE ENCOUNTER
Was she asking for a lab test or an ultrasound of the liver (I thought that she need the ultrasound)

## 2022-03-09 NOTE — TELEPHONE ENCOUNTER
Patient called asking for refill on lancets  Patient states that she is unable to get lancets until her medicare form is filled out

## 2022-03-11 ENCOUNTER — TELEPHONE (OUTPATIENT)
Dept: FAMILY MEDICINE CLINIC | Facility: CLINIC | Age: 77
End: 2022-03-11

## 2022-03-11 NOTE — TELEPHONE ENCOUNTER
I spoke with the pharmacist at [facility], she faxed  over the Medicare forms  I filled out the forms, Dr Sonny Allen signed and I faxed them back over  I called the pharmacist back and confirmed that she received, she did receive and was going to process medications  I notified patient

## 2022-03-11 NOTE — TELEPHONE ENCOUNTER
Patient called very upset   She stated that they still wont fill her Rx for these lancets because dr Eneida Matthews did not fill out forms from medicare   She is requesting that [facility] calls [facility]   [phone]

## 2022-03-18 DIAGNOSIS — I10 PRIMARY HYPERTENSION: Primary | ICD-10-CM

## 2022-03-18 RX ORDER — ATENOLOL 25 MG/1
25 TABLET ORAL DAILY
Qty: 90 TABLET | Refills: 0 | Status: SHIPPED | OUTPATIENT
Start: 2022-03-18 | End: 2022-06-09 | Stop reason: SDUPTHER

## 2022-03-28 ENCOUNTER — RA CDI HCC (OUTPATIENT)
Dept: OTHER | Facility: HOSPITAL | Age: 77
End: 2022-03-28

## 2022-03-28 NOTE — PROGRESS NOTES
[id number]  [facility]  coding opportunities          Chart Reviewed number of suggestions sent to Provider: 1     Patients Insurance     Medicare Insurance: [employer]

## 2022-04-04 DIAGNOSIS — F41.9 ANXIETY: ICD-10-CM

## 2022-04-04 RX ORDER — CHLORDIAZEPOXIDE HYDROCHLORIDE 5 MG/1
5 CAPSULE, GELATIN COATED ORAL 2 TIMES DAILY
Qty: 60 CAPSULE | Refills: 0 | Status: SHIPPED | OUTPATIENT
Start: 2022-04-04 | End: 2022-04-28 | Stop reason: SDUPTHER

## 2022-04-05 ENCOUNTER — OFFICE VISIT (OUTPATIENT)
Dept: FAMILY MEDICINE CLINIC | Facility: CLINIC | Age: 77
End: 2022-04-05
Payer: MEDICARE

## 2022-04-05 VITALS
SYSTOLIC BLOOD PRESSURE: 110 MMHG | HEIGHT: 60 IN | TEMPERATURE: 98.7 F | DIASTOLIC BLOOD PRESSURE: 70 MMHG | BODY MASS INDEX: 26.31 KG/M2 | WEIGHT: 134 LBS | HEART RATE: 72 BPM

## 2022-04-05 DIAGNOSIS — F41.1 GENERALIZED ANXIETY DISORDER: ICD-10-CM

## 2022-04-05 DIAGNOSIS — I63.9 CEREBROVASCULAR ACCIDENT (CVA), UNSPECIFIED MECHANISM (HCC): ICD-10-CM

## 2022-04-05 DIAGNOSIS — I10 PRIMARY HYPERTENSION: ICD-10-CM

## 2022-04-05 DIAGNOSIS — E11.9 TYPE 2 DIABETES MELLITUS WITHOUT COMPLICATION, WITHOUT LONG-TERM CURRENT USE OF INSULIN (HCC): Primary | ICD-10-CM

## 2022-04-05 DIAGNOSIS — I25.10 3-VESSEL CAD: ICD-10-CM

## 2022-04-05 PROCEDURE — 99214 OFFICE O/P EST MOD 30 MIN: CPT | Performed by: FAMILY MEDICINE

## 2022-04-05 NOTE — PROGRESS NOTES
Assessment/Plan:    No problem-specific Assessment & Plan notes found for this encounter  There are no diagnoses linked to this encounter  Subjective:      Patient ID: Anai Arriaza is a 68 y o  female  f/u multiple med issues  - pt still dealing with "leg kicking at night" and anxiety since having her CABG  Mirapex helps a little - takes one, then may take a second one an hour letter if not improved  Finds that she frequently needs the second dose  Has multiple stressors besides healing from her own surgery  In particular, pt found out that her sister has stopped/refused further treatment for her CA  - pt states that she still has some L upper chest discomfort (chest wall) s/p CABG  Wounds appear to have closed well  She denies worsening SOB or CP with exertion  She was to start Cardiac Rehab but is not sure that she wants to attend  No increased edema  - pt denies any new GI or  issues  - pt without any new neuro/extremity complaints  - I reviewed recent labs and study results with pt and her friend          The following portions of the patient's history were reviewed and updated as appropriate:   She  has a past medical history of Gastroenteritis    She   Patient Active Problem List    Diagnosis Date Noted    CVA (cerebral vascular accident) ([facility] [address] ) 02/15/2022    Pleural effusion, left 02/13/2022    Dysphagia 02/06/2022    Encephalopathy 01/29/2022    Acute hypoxemic respiratory failure ([address] ) 01/28/2022    Acute blood loss anemia 01/27/2022    Cardiogenic shock ([facility] [address] ) 01/27/2022    S/P CABG (coronary artery bypass graft) 01/27/2022    3-vessel CAD 01/17/2022    Myocardiopathy ([facility] [address] ) 02/23/2021    Carotid stenosis, right 06/16/2020    Heart block AV complete ([facility] [address] ) 06/15/2020    Health care maintenance 09/26/2018    Neck pain 06/06/2017    Recurrent major depressive disorder, in partial remission ([facility] [address] ) 05/10/2017    Pacemaker 09/25/2015    Tobacco abuse 09/17/2015    Heart block AV second degree 09/16/2015    Elevated ALT measurement 06/17/2013    Elevated AST (SGOT) 06/17/2013    Mixed hyperlipidemia 06/17/2013    Type 2 diabetes mellitus without complication, without long-term current use of insulin ([facility] [address] ) 06/17/2013    Anxiety disorder 06/10/2013    Hypertension 09/27/2012     She  has a past surgical history that includes Atrial cardiac pacemaker insertion  She  reports that she has quit smoking  She has never used smokeless tobacco  She reports previous alcohol use  She reports that she does not use drugs  Current Outpatient Medications   Medication Sig Dispense Refill    amLODIPine (NORVASC) 5 mg tablet Take 1 tablet (5 mg total) by mouth daily 90 tablet 3    ascorbic acid (VITAMIN C) 500 mg tablet Take by mouth daily      atenolol (TENORMIN) 25 mg tablet Take 1 tablet (25 mg total) by mouth daily 90 tablet 0    betamethasone, augmented, (DIPROLENE) 0 05 % ointment APPLYTWICE DAILY TO AFFECTED AREA ON LEG FOR 2 WEEKS THEN REDUCE      (REFER TO PRESCRIPTION NOTES)   Blood Glucose Monitoring Suppl (Contour Next Monitor) w/Device KIT Use bid as directed 1 kit 1    chlordiazePOXIDE (LIBRIUM) 5 mg capsule Take 1 capsule (5 mg total) by mouth 2 (two) times a day 60 capsule 0    Droplet Pen Needles 32G X 4 MM MISC USE ONCE DAILY FOR INSULIN INJECTION      fluticasone (FLONASE) 50 mcg/act nasal spray 2 sprays into each nostril daily 2 sprays bilat qd 1 Bottle 2    glucose blood (Contour Next Test) test strip Patient tests blood sugars twice daily 100 each 5    hydrocortisone 2 5 % cream MIX PEA SIZED DOSE WITH KETOCONAZOLE CREAM AND APPLY UNDER BREAST     (REFER TO PRESCRIPTION NOTES)        hydrocortisone-pramoxine (PROCTOFOAM-HC) 1-1 % FOAM rectal foam Insert 1 applicator into the rectum 2 (two) times a day 20 g 1    imipramine (TOFRANIL) 25 mg tablet Take 1 tablet (25 mg total) by mouth 2 (two) times a day 180 tablet 3    insulin glargine (LANTUS) 100 units/mL subcutaneous injection Inject 30 Units under the skin daily      ketoconazole (NIZORAL) 2 % cream APPLY TOPICALLY TWICE DAILY UNTIL RASH CLEARS UNDER BREASTS      losartan (COZAAR) 25 mg tablet Take 25 mg by mouth 2 (two) times a day      Microlet Lancets [facility] Patient tests blood glucose twice daily 100 each 5    pantoprazole (PROTONIX) 40 mg tablet take 1 tablet by mouth twice a day 30 TO 60 MINUTES BEFORE BREAKFAST AND DINNER      pramipexole (MIRAPEX) 0 125 mg tablet take 1 to 2 tablets 1 TO 2 HOURS BEFORE BEDTIME 180 tablet 0    Promethazine-DM (PHENERGAN-DM) 6 25-15 mg/5 mL oral syrup Take 5 mL by mouth 4 (four) times a day as needed for cough 150 mL 0    RA Aspirin EC 81 MG EC tablet take 1 tablet by mouth once daily 90 tablet 0    triamcinolone (KENALOG) 0 1 % cream Apply topically 2 (two) times a day 30 g 0    vitamin E 100 UNIT capsule Take 800 Units by mouth      Vitamin E 400 units TABS Take 2 tablets by mouth daily       No current facility-administered medications for this visit  She is allergic to atorvastatin, clarithromycin, hydrogen peroxide, levofloxacin, other, penicillins, repaglinide, and rosuvastatin       Review of Systems   Constitutional: Positive for activity change (still a little below baseline) and fatigue (improved but not resolved)  Negative for chills and fever  HENT: Negative  Eyes: Negative  Respiratory: Positive for shortness of breath (improved compared to before surgery)  Negative for wheezing  Cardiovascular: Positive for chest pain (L upper parasternal) and leg swelling (trace)  Negative for palpitations  Gastrointestinal: Negative  Genitourinary: Negative  Nocturia   Musculoskeletal: Positive for arthralgias, gait problem and myalgias  Skin: Negative  Neurological: Negative for dizziness, facial asymmetry, weakness, light-headedness, numbness and headaches  Psychiatric/Behavioral: Positive for dysphoric mood and sleep disturbance  Negative for hallucinations and suicidal ideas  The patient is nervous/anxious  Objective:      /70   Pulse 72   Temp 98 7 °F (37 1 °C)   Ht 5' (1 524 m)   Wt 60 8 kg (134 lb)   BMI 26 17 kg/m²          Physical Exam  Vitals reviewed  Constitutional:       Appearance: She is well-developed  She is not diaphoretic  HENT:      Head: Normocephalic and atraumatic  Right Ear: Tympanic membrane, ear canal and external ear normal       Left Ear: Tympanic membrane, ear canal and external ear normal       Mouth/Throat:      Mouth: Mucous membranes are moist    Eyes:      Extraocular Movements: Extraocular movements intact  Pupils: Pupils are equal, round, and reactive to light  Comments: ? Sl conjunctival pallor? Neck:      Thyroid: No thyromegaly  Vascular: No JVD  Cardiovascular:      Rate and Rhythm: Normal rate and regular rhythm  Pulmonary:      Effort: Pulmonary effort is normal       Breath sounds: Normal breath sounds  No wheezing or rales  Abdominal:      General: There is no distension  Palpations: Abdomen is soft  There is no mass  Tenderness: There is no abdominal tenderness  Musculoskeletal:         General: Tenderness (mild In L upper parasternal area) and deformity (mild diffuse OA changes) present  No swelling  Normal range of motion  Cervical back: Normal range of motion and neck supple  No tenderness  No muscular tenderness  Right lower leg: Edema (trace) present  Left lower leg: Edema (trace) present  Lymphadenopathy:      Cervical: No cervical adenopathy  Skin:     General: Skin is warm and dry  Capillary Refill: Capillary refill takes less than 2 seconds  Neurological:      Mental Status: She is alert and oriented to person, place, and time  Cranial Nerves: No cranial nerve deficit  Sensory: No sensory deficit  Motor: No weakness or abnormal muscle tone        Gait: Gait normal       Deep Tendon Reflexes: Reflexes are normal and symmetric  Reflexes normal    Psychiatric:         Thought Content: Thought content normal          Judgment: Judgment normal       Comments: FELIPE-7 Flowsheet Screening      Most Recent Value   Over the last 2 weeks, how often have you been bothered by any of the   following problems?     Feeling nervous, anxious, or on edge 2   Not being able to stop or control worrying 1   Worrying too much about different things 2   Trouble relaxing 2   Being so restless that it is hard to sit still 1   Becoming easily annoyed or irritable 1   Feeling afraid as if something awful might happen 0   FELIPE-7 Total Score 9

## 2022-04-07 PROBLEM — R57.0 CARDIOGENIC SHOCK (HCC): Status: RESOLVED | Noted: 2022-01-27 | Resolved: 2022-04-07

## 2022-04-07 PROBLEM — J90 PLEURAL EFFUSION, LEFT: Status: RESOLVED | Noted: 2022-02-13 | Resolved: 2022-04-07

## 2022-04-07 PROBLEM — J96.01 ACUTE HYPOXEMIC RESPIRATORY FAILURE (HCC): Status: RESOLVED | Noted: 2022-01-28 | Resolved: 2022-04-07

## 2022-04-07 NOTE — ASSESSMENT & PLAN NOTE
With?  Restless leg versus other  Mirapex does seem to help with the leg issues, though at a higher dose  I will have her increase the dose to 0 25 mg 2-3 hours before bed  She will call next week with follow-up  Continue all other medications for now  Recheck 3 months-earlier if worse

## 2022-04-07 NOTE — ASSESSMENT & PLAN NOTE
Lab Results   Component Value Date    HGBA1C 10 3 (H) 01/18/2022   Had been with poor control but appears to be improving  Pt appears to be doing better with her diet  nonfasting BG last month was 157  Continue present care  Recheck A1C later this month   F/u 3m

## 2022-04-07 NOTE — ASSESSMENT & PLAN NOTE
Status post cardiac bypass  Discussed importance of starting cardiac rehab with a can monitor her as she increases activity  Patient agrees to go  Follow-up with Cardiology    Recheck 3 months

## 2022-04-15 ENCOUNTER — TELEPHONE (OUTPATIENT)
Dept: FAMILY MEDICINE CLINIC | Facility: CLINIC | Age: 77
End: 2022-04-15

## 2022-04-15 NOTE — TELEPHONE ENCOUNTER
Patient informed  Will take 3 tabs at night over the weekend and call on Monday with update  Patient states she took two 325mg tylenol late last night when the two tablets were not working and that helped her get to sleep  She wants to know if she can continue tylenol PRN to help if the three tabs don't work  Patient reports significant anxiety in late afternoon/evening along with RLS

## 2022-04-15 NOTE — TELEPHONE ENCOUNTER
----- Message from Dell Apley, MD sent at 4/7/2022  7:53 AM EDT -----  I am not sure if I gave patient labs to check her blood sugars at the end of the month   Please call her and have her go for labs including A1C after 4/18/22

## 2022-04-15 NOTE — TELEPHONE ENCOUNTER
Patient called in to give you an update    Since her last office visit on 4/5, she started taking 2 of the Pramipexole at a time, before bed like you suggested    She states sometimes it works and sometimes it doesn't  The last 2 nights have been bad   She states she was very restless with kicking and getting up and down all night    She said she gets very aggravated because she can't sleep and she "dreads nights anymore"

## 2022-04-15 NOTE — TELEPHONE ENCOUNTER
Patient was not given labs  Would like them mailed so she can have them done at [facility]  Mailed 4/15/22

## 2022-04-15 NOTE — TELEPHONE ENCOUNTER
----- Message from Wes Tejada MD sent at 4/7/2022  7:53 AM EDT -----  I am not sure if I gave patient labs to check her blood sugars at the end of the month   Please call her and have her go for labs including A1C after 4/18/22

## 2022-04-15 NOTE — TELEPHONE ENCOUNTER
She is presently taking Mirapex 0 125mg, 2 tab a day ( 25mg total)   She can increase to 3 tab (0 375mg) together 2-3h before bedtime

## 2022-04-15 NOTE — TELEPHONE ENCOUNTER
Patient informed  Mailed labs to mango acevedo have done at [facility]

## 2022-04-18 NOTE — TELEPHONE ENCOUNTER
Called patient and advised   She also wanted you to know that she is sleeping better since increasing her Mirapex to 3 tablets at bedtime

## 2022-04-28 DIAGNOSIS — G25.81 RESTLESS LEG: ICD-10-CM

## 2022-04-28 DIAGNOSIS — F41.9 ANXIETY: ICD-10-CM

## 2022-04-28 RX ORDER — CHLORDIAZEPOXIDE HYDROCHLORIDE 5 MG/1
5 CAPSULE, GELATIN COATED ORAL 2 TIMES DAILY
Qty: 60 CAPSULE | Refills: 0 | Status: SHIPPED | OUTPATIENT
Start: 2022-04-28 | End: 2022-06-01 | Stop reason: SDUPTHER

## 2022-04-28 RX ORDER — PRAMIPEXOLE DIHYDROCHLORIDE 0.12 MG/1
TABLET ORAL
Qty: 180 TABLET | Refills: 0 | Status: SHIPPED | OUTPATIENT
Start: 2022-04-28 | End: 2022-05-02 | Stop reason: SDUPTHER

## 2022-04-28 NOTE — TELEPHONE ENCOUNTER
04/04/2022 04/04/2022 chlordiazePOXIDE HCL (Capsule)  60 0 30 5 MG NA DANNY CHOWDARY POGODZINSKI  THRIFT

## 2022-05-02 ENCOUNTER — TELEPHONE (OUTPATIENT)
Dept: FAMILY MEDICINE CLINIC | Facility: CLINIC | Age: 77
End: 2022-05-02

## 2022-05-02 NOTE — TELEPHONE ENCOUNTER
Patient called asking if you can resend script for medication pramipexole 0 125 mg to [facility]    Per patient, needs to say 3 times a night      Script that was sent states 1-2 times

## 2022-05-16 ENCOUNTER — TELEPHONE (OUTPATIENT)
Dept: FAMILY MEDICINE CLINIC | Facility: CLINIC | Age: 77
End: 2022-05-16

## 2022-05-16 NOTE — TELEPHONE ENCOUNTER
Patient called to let you know that she is not sleeping again     She is up and down all night for about a week and she is very frustrated

## 2022-05-18 DIAGNOSIS — G25.81 RESTLESS LEG: ICD-10-CM

## 2022-05-18 RX ORDER — PRAMIPEXOLE DIHYDROCHLORIDE 0.5 MG/1
TABLET ORAL
Qty: 30 TABLET | Refills: 2 | Status: SHIPPED | OUTPATIENT
Start: 2022-05-18 | End: 2022-08-01 | Stop reason: SDUPTHER

## 2022-05-18 NOTE — TELEPHONE ENCOUNTER
I changed the pramipexole from 0 125mg to a 0 5mg tab  Take 1 tab 1-2h before bed  Call Monday with follow up

## 2022-06-01 DIAGNOSIS — F41.9 ANXIETY: ICD-10-CM

## 2022-06-01 RX ORDER — CHLORDIAZEPOXIDE HYDROCHLORIDE 5 MG/1
5 CAPSULE, GELATIN COATED ORAL 2 TIMES DAILY
Qty: 60 CAPSULE | Refills: 0 | Status: SHIPPED | OUTPATIENT
Start: 2022-06-01 | End: 2022-07-05 | Stop reason: SDUPTHER

## 2022-06-01 NOTE — TELEPHONE ENCOUNTER
05/02/2022 04/28/2022 chlordiazePOXIDE HCL (Capsule)  60 0 30 5 MG NA DANNY CHOWDARY POGODZINSKI THRIFT D

## 2022-06-09 DIAGNOSIS — I10 PRIMARY HYPERTENSION: ICD-10-CM

## 2022-06-09 RX ORDER — ATENOLOL 25 MG/1
25 TABLET ORAL DAILY
Qty: 90 TABLET | Refills: 0 | Status: SHIPPED | OUTPATIENT
Start: 2022-06-09

## 2022-07-05 DIAGNOSIS — F41.9 ANXIETY: ICD-10-CM

## 2022-07-05 RX ORDER — CHLORDIAZEPOXIDE HYDROCHLORIDE 5 MG/1
5 CAPSULE, GELATIN COATED ORAL 2 TIMES DAILY
Qty: 60 CAPSULE | Refills: 0 | Status: SHIPPED | OUTPATIENT
Start: 2022-07-05 | End: 2022-08-01 | Stop reason: SDUPTHER

## 2022-07-05 NOTE — TELEPHONE ENCOUNTER
1  [id number] 06/01/2022 06/01/2022 chlordiazePOXIDE HCL (Capsule)  60 0 30 5 MG NA [facility]  [facility] 0 / 0 [place]    1  [id number] 05/02/2022 04/28/2022 chlordiazePOXIDE HCL (Capsule)  60 0 30 5 MG NA DANNY CHOWDARY [facility]OD[facility]INSKI [facility]  Medicare 00 / 00 [place]    1  [id number] 04/04/2022 04/04/2022 chlordiazePOXIDE HCL (Capsule)  60 0 30 5 MG NA DANNY CHOWDARY [facility]OGODZINSKI [facility]    Medicare 00 / 00 [place]

## 2022-07-21 ENCOUNTER — TELEPHONE (OUTPATIENT)
Dept: FAMILY MEDICINE CLINIC | Facility: CLINIC | Age: 77
End: 2022-07-21

## 2022-07-21 DIAGNOSIS — E11.9 TYPE 2 DIABETES MELLITUS WITHOUT COMPLICATION, WITHOUT LONG-TERM CURRENT USE OF INSULIN (HCC): Primary | ICD-10-CM

## 2022-07-21 DIAGNOSIS — I25.10 3-VESSEL CAD: ICD-10-CM

## 2022-07-21 DIAGNOSIS — E78.2 MIXED HYPERLIPIDEMIA: ICD-10-CM

## 2022-07-21 NOTE — TELEPHONE ENCOUNTER
Patient called back and said that she is going to come in and  the script instead of having it mailed

## 2022-07-21 NOTE — TELEPHONE ENCOUNTER
Patient is requesting a referral to see a nutritionist   But it has to be at [address]     Please advise

## 2022-07-21 NOTE — TELEPHONE ENCOUNTER
Patient states she wants help controlling her cholesterol  Dr Giovana Killian just put her on prescription fish oil  She doesn't know what she is supposed to eat or not eat for this  She was told to eat eggs prior to this, but now their office said don't eat eggs   She is confused as to what she should be doing and wants a nutritionist to spell it out for her

## 2022-08-01 DIAGNOSIS — F41.9 ANXIETY: ICD-10-CM

## 2022-08-01 DIAGNOSIS — G25.81 RESTLESS LEG: ICD-10-CM

## 2022-08-01 RX ORDER — CHLORDIAZEPOXIDE HYDROCHLORIDE 5 MG/1
5 CAPSULE, GELATIN COATED ORAL 2 TIMES DAILY
Qty: 60 CAPSULE | Refills: 0 | Status: SHIPPED | OUTPATIENT
Start: 2022-08-01 | End: 2022-08-31 | Stop reason: SDUPTHER

## 2022-08-01 RX ORDER — PRAMIPEXOLE DIHYDROCHLORIDE 0.5 MG/1
TABLET ORAL
Qty: 30 TABLET | Refills: 2 | Status: SHIPPED | OUTPATIENT
Start: 2022-08-01 | End: 2022-08-11 | Stop reason: SDUPTHER

## 2022-08-02 ENCOUNTER — RA CDI HCC (OUTPATIENT)
Dept: OTHER | Facility: HOSPITAL | Age: 77
End: 2022-08-02

## 2022-08-02 LAB — HBA1C MFR BLD HPLC: 9.4 %

## 2022-08-02 NOTE — PROGRESS NOTES
E11 [id number]  [facility]  coding opportunities          Chart Reviewed number of suggestions sent to Provider: 1     Patients Insurance     Medicare Insurance: [employer]

## 2022-08-11 ENCOUNTER — OFFICE VISIT (OUTPATIENT)
Dept: FAMILY MEDICINE CLINIC | Facility: CLINIC | Age: 77
End: 2022-08-11
Payer: MEDICARE

## 2022-08-11 ENCOUNTER — TELEPHONE (OUTPATIENT)
Dept: FAMILY MEDICINE CLINIC | Facility: CLINIC | Age: 77
End: 2022-08-11

## 2022-08-11 VITALS
HEIGHT: 60 IN | TEMPERATURE: 98.3 F | HEART RATE: 74 BPM | WEIGHT: 138 LBS | BODY MASS INDEX: 27.09 KG/M2 | SYSTOLIC BLOOD PRESSURE: 118 MMHG | DIASTOLIC BLOOD PRESSURE: 78 MMHG

## 2022-08-11 DIAGNOSIS — I25.10 3-VESSEL CAD: ICD-10-CM

## 2022-08-11 DIAGNOSIS — I73.9 CLAUDICATION (HCC): ICD-10-CM

## 2022-08-11 DIAGNOSIS — I65.21 CAROTID STENOSIS, RIGHT: ICD-10-CM

## 2022-08-11 DIAGNOSIS — G25.81 RESTLESS LEG: ICD-10-CM

## 2022-08-11 DIAGNOSIS — E11.9 TYPE 2 DIABETES MELLITUS WITHOUT COMPLICATION, WITHOUT LONG-TERM CURRENT USE OF INSULIN (HCC): ICD-10-CM

## 2022-08-11 DIAGNOSIS — I10 PRIMARY HYPERTENSION: ICD-10-CM

## 2022-08-11 DIAGNOSIS — R09.89 RALES: ICD-10-CM

## 2022-08-11 DIAGNOSIS — Z00.00 MEDICARE ANNUAL WELLNESS VISIT, SUBSEQUENT: Primary | ICD-10-CM

## 2022-08-11 PROBLEM — I25.5 CARDIOMYOPATHY, ISCHEMIC: Status: ACTIVE | Noted: 2021-02-23

## 2022-08-11 PROCEDURE — G0439 PPPS, SUBSEQ VISIT: HCPCS | Performed by: FAMILY MEDICINE

## 2022-08-11 PROCEDURE — 99214 OFFICE O/P EST MOD 30 MIN: CPT | Performed by: FAMILY MEDICINE

## 2022-08-11 RX ORDER — EZETIMIBE 10 MG/1
10 TABLET ORAL DAILY
COMMUNITY
Start: 2022-07-21

## 2022-08-11 RX ORDER — INSULIN GLARGINE 100 [IU]/ML
30 INJECTION, SOLUTION SUBCUTANEOUS DAILY
COMMUNITY
Start: 2022-08-10 | End: 2022-10-06 | Stop reason: SDUPTHER

## 2022-08-11 RX ORDER — REPAGLINIDE 0.5 MG/1
0.5 TABLET ORAL
Qty: 90 TABLET | Refills: 1 | Status: SHIPPED | OUTPATIENT
Start: 2022-08-11 | End: 2022-10-03

## 2022-08-11 RX ORDER — PRAMIPEXOLE DIHYDROCHLORIDE 0.75 MG/1
TABLET ORAL
Qty: 30 TABLET | Refills: 1 | Status: SHIPPED | OUTPATIENT
Start: 2022-08-11 | End: 2022-10-03

## 2022-08-11 RX ORDER — ICOSAPENT ETHYL 1000 MG/1
2 CAPSULE ORAL 2 TIMES DAILY
COMMUNITY
Start: 2022-07-21

## 2022-08-11 RX ORDER — LOSARTAN POTASSIUM 50 MG/1
50 TABLET ORAL 2 TIMES DAILY
COMMUNITY
Start: 2022-07-19

## 2022-08-11 RX ORDER — INSULIN GLARGINE 100 [IU]/ML
40 INJECTION, SOLUTION SUBCUTANEOUS DAILY
Qty: 12 ML | Refills: 3 | Status: SHIPPED | OUTPATIENT
Start: 2022-08-11

## 2022-08-11 NOTE — TELEPHONE ENCOUNTER
Called [facility] and left message on Milton's voicemail to verify if medicare will accept  [phone] - I will speak to her when she returns call

## 2022-08-11 NOTE — PATIENT INSTRUCTIONS
Medicare Preventive Visit Patient Instructions  Thank you for completing your Welcome to Medicare Visit or Medicare Annual Wellness Visit today  Your next wellness visit will be due in one year (8/12/2023)  The screening/preventive services that you may require over the next 5-10 years are detailed below  Some tests may not apply to you based off risk factors and/or age  Screening tests ordered at today's visit but not completed yet may show as past due  Also, please note that scanned in results may not display below  Preventive Screenings:  Service Recommendations Previous Testing/Comments   Colorectal Cancer Screening  * Colonoscopy    * Fecal Occult Blood Test (FOBT)/Fecal Immunochemical Test (FIT)  * Fecal DNA/Cologuard Test  * Flexible Sigmoidoscopy Age: 39-70 years old   Colonoscopy: every 10 years (may be performed more frequently if at higher risk)  OR  FOBT/FIT: every 1 year  OR  Cologuard: every 3 years  OR  Sigmoidoscopy: every 5 years  Screening may be recommended earlier than age 39 if at higher risk for colorectal cancer  Also, an individualized decision between you and your healthcare provider will decide whether screening between the ages of 74-80 would be appropriate  Colonoscopy: Not on file  FOBT/FIT: Not on file  Cologuard: Not on file  Sigmoidoscopy: Not on file          Breast Cancer Screening Age: 36 years old  Frequency: every 1-2 years  Not required if history of left and right mastectomy Mammogram: 04/24/2008        Cervical Cancer Screening Between the ages of 21-29, pap smear recommended once every 3 years  Between the ages of 33-67, can perform pap smear with HPV co-testing every 5 years     Recommendations may differ for women with a history of total hysterectomy, cervical cancer, or abnormal pap smears in past  Pap Smear: Not on file    Screening Not Indicated   Hepatitis C Screening Once for adults born between [facility]  More frequently in patients at high risk for Hepatitis C Hep C Antibody: 03/22/2019    Screening Current   Diabetes Screening 1-2 times per year if you're at risk for diabetes or have pre-diabetes Fasting glucose: 122 mg/dL (3/22/2019)  A1C: 9 4 % (8/2/2022)  Screening Not Indicated  History Diabetes   Cholesterol Screening Once every 5 years if you don't have a lipid disorder  May order more often based on risk factors  Lipid panel: 01/13/2022    Screening Not Indicated  History Lipid Disorder     Other Preventive Screenings Covered by Medicare:  1  Abdominal Aortic Aneurysm (AAA) Screening: covered once if your at risk  You're considered to be at risk if you have a family history of AAA  2  Lung Cancer Screening: covers low dose CT scan once per year if you meet all of the following conditions: (1) Age 50-69; (2) No signs or symptoms of lung cancer; (3) Current smoker or have quit smoking within the last 15 years; (4) You have a tobacco smoking history of at least 20 pack years (packs per day multiplied by number of years you smoked); (5) You get a written order from a healthcare provider  3  Glaucoma Screening: covered annually if you're considered high risk: (1) You have diabetes OR (2) Family history of glaucoma OR (3)  aged 48 and older OR (3)  American aged 72 and older  3  Osteoporosis Screening: covered every 2 years if you meet one of the following conditions: (1) You're estrogen deficient and at risk for osteoporosis based off medical history and other findings; (2) Have a vertebral abnormality; (3) On glucocorticoid therapy for more than 3 months; (4) Have primary hyperparathyroidism; (5) On osteoporosis medications and need to assess response to drug therapy  · Last bone density test (DXA Scan): Not on file  5  HIV Screening: covered annually if you're between the age of 12-76  Also covered annually if you are younger than 13 and older than 72 with risk factors for HIV infection   For pregnant patients, it is covered up to 3 times per pregnancy  Immunizations:  Immunization Recommendations   Influenza Vaccine Annual influenza vaccination during flu season is recommended for all persons aged >= 6 months who do not have contraindications   Pneumococcal Vaccine   * Pneumococcal conjugate vaccine = PCV13 (Prevnar 13), PCV15 (Vaxneuvance), PCV20 (Prevnar 20)  * Pneumococcal polysaccharide vaccine = PPSV23 (Pneumovax) Adults 25-60 years old: 1-3 doses may be recommended based on certain risk factors  Adults 72 years old: 1-2 doses may be recommended based off what pneumonia vaccine you previously received   Hepatitis B Vaccine 3 dose series if at intermediate or high risk (ex: diabetes, end stage renal disease, liver disease)   Tetanus (Td) Vaccine - COST NOT COVERED BY MEDICARE PART B Following completion of primary series, a booster dose should be given every 10 years to maintain immunity against tetanus  Td may also be given as tetanus wound prophylaxis  Tdap Vaccine - COST NOT COVERED BY MEDICARE PART B Recommended at least once for all adults  For pregnant patients, recommended with each pregnancy  Shingles Vaccine (Shingrix) - COST NOT COVERED BY MEDICARE PART B  2 shot series recommended in those aged 48 and above     Health Maintenance Due:      Topic Date Due    DXA SCAN  Never done    Cervical Cancer Screening  Never done    Breast Cancer Screening: Mammogram  04/24/2009    Hepatitis C Screening  Completed     Immunizations Due:      Topic Date Due    Pneumococcal Vaccine: 65+ Years (1 - PCV) Never done    COVID-19 Vaccine (4 - Booster for Pfizer series) 04/09/2022    Influenza Vaccine (1) 09/01/2022     Advance Directives   What are advance directives? Advance directives are legal documents that state your wishes and plans for medical care  These plans are made ahead of time in case you lose your ability to make decisions for yourself   Advance directives can apply to any medical decision, such as the treatments you want, and if you want to donate organs  What are the types of advance directives? There are many types of advance directives, and each state has rules about how to use them  You may choose a combination of any of the following:  · Living will: This is a written record of the treatment you want  You can also choose which treatments you do not want, which to limit, and which to stop at a certain time  This includes surgery, medicine, IV fluid, and tube feedings  · Durable power of  for healthcare [facility]): This is a written record that states who you want to make healthcare choices for you when you are unable to make them for yourself  This person, called a proxy, is usually a family member or a friend  You may choose more than 1 proxy  · Do not resuscitate (DNR) order:  A DNR order is used in case your heart stops beating or you stop breathing  It is a request not to have certain forms of treatment, such as CPR  A DNR order may be included in other types of advance directives  · Medical directive: This covers the care that you want if you are in a coma, near death, or unable to make decisions for yourself  You can list the treatments you want for each condition  Treatment may include pain medicine, surgery, blood transfusions, dialysis, IV or tube feedings, and a ventilator (breathing machine)  · Values history: This document has questions about your views, beliefs, and how you feel and think about life  This information can help others choose the care that you would choose  Why are advance directives important? An advance directive helps you control your care  Although spoken wishes may be used, it is better to have your wishes written down  Spoken wishes can be misunderstood, or not followed  Treatments may be given even if you do not want them  An advance directive may make it easier for your family to make difficult choices about your care     Weight Management   Why it is important to manage your weight:  Being overweight increases your risk of health conditions such as heart disease, high blood pressure, type 2 diabetes, and certain types of cancer  It can also increase your risk for osteoarthritis, sleep apnea, and other respiratory problems  Aim for a slow, steady weight loss  Even a small amount of weight loss can lower your risk of health problems  How to lose weight safely:  A safe and healthy way to lose weight is to eat fewer calories and get regular exercise  You can lose up about 1 pound a week by decreasing the number of calories you eat by 500 calories each day  Healthy meal plan for weight management:  A healthy meal plan includes a variety of foods, contains fewer calories, and helps you stay healthy  A healthy meal plan includes the following:  · Eat whole-grain foods more often  A healthy meal plan should contain fiber  Fiber is the part of grains, fruits, and vegetables that is not broken down by your body  Whole-grain foods are healthy and provide extra fiber in your diet  Some examples of whole-grain foods are whole-wheat breads and pastas, oatmeal, brown rice, and bulgur  · Eat a variety of vegetables every day  Include dark, leafy greens such as spinach, kale, rox greens, and mustard greens  Eat yellow and orange vegetables such as carrots, sweet potatoes, and winter squash  · Eat a variety of fruits every day  Choose fresh or canned fruit (canned in its own juice or light syrup) instead of juice  Fruit juice has very little or no fiber  · Eat low-fat dairy foods  Drink fat-free (skim) milk or 1% milk  Eat fat-free yogurt and low-fat cottage cheese  Try low-fat cheeses such as mozzarella and other reduced-fat cheeses  · Choose meat and other protein foods that are low in fat  Choose beans or other legumes such as split peas or lentils  Choose fish, skinless poultry (chicken or turkey), or lean cuts of red meat (beef or pork)   Before you cook meat or poultry, cut off any visible fat  · Use less fat and oil  Try baking foods instead of frying them  Add less fat, such as margarine, sour cream, regular salad dressing and mayonnaise to foods  Eat fewer high-fat foods  Some examples of high-fat foods include french fries, doughnuts, ice cream, and cakes  · Eat fewer sweets  Limit foods and drinks that are high in sugar  This includes candy, cookies, regular soda, and sweetened drinks  Exercise:  Exercise at least 30 minutes per day on most days of the week  Some examples of exercise include walking, biking, dancing, and swimming  You can also fit in more physical activity by taking the stairs instead of the elevator or parking farther away from stores  Ask your healthcare provider about the best exercise plan for you  © Copyright [employer] 2018 Information is for End User's use only and may not be sold, redistributed or otherwise used for commercial purposes  All illustrations and images included in CareNotes® are the copyrighted property of [employer]  or [facility] Preventive Visit Patient Instructions  Thank you for completing your Welcome to Medicare Visit or Medicare Annual Wellness Visit today  Your next wellness visit will be due in one year (8/12/2023)  The screening/preventive services that you may require over the next 5-10 years are detailed below  Some tests may not apply to you based off risk factors and/or age  Screening tests ordered at today's visit but not completed yet may show as past due  Also, please note that scanned in results may not display below    Preventive Screenings:  Service Recommendations Previous Testing/Comments   Colorectal Cancer Screening  * Colonoscopy    * Fecal Occult Blood Test (FOBT)/Fecal Immunochemical Test (FIT)  * Fecal DNA/Cologuard Test  * Flexible Sigmoidoscopy Age: 39-70 years old   Colonoscopy: every 10 years (may be performed more frequently if at higher risk)  OR  FOBT/FIT: every 1 year  OR  Cologuard: every 3 years  OR  Sigmoidoscopy: every 5 years  Screening may be recommended earlier than age 39 if at higher risk for colorectal cancer  Also, an individualized decision between you and your healthcare provider will decide whether screening between the ages of 74-80 would be appropriate  Colonoscopy: Not on file  FOBT/FIT: Not on file  Cologuard: Not on file  Sigmoidoscopy: Not on file          Breast Cancer Screening Age: 36 years old  Frequency: every 1-2 years  Not required if history of left and right mastectomy Mammogram: 04/24/2008        Cervical Cancer Screening Between the ages of 21-29, pap smear recommended once every 3 years  Between the ages of 33-67, can perform pap smear with HPV co-testing every 5 years  Recommendations may differ for women with a history of total hysterectomy, cervical cancer, or abnormal pap smears in past  Pap Smear: Not on file    Screening Not Indicated   Hepatitis C Screening Once for adults born between 1945 and 1965  More frequently in patients at high risk for Hepatitis C Hep C Antibody: 03/22/2019    Screening Current   Diabetes Screening 1-2 times per year if you're at risk for diabetes or have pre-diabetes Fasting glucose: 122 mg/dL (3/22/2019)  A1C: 9 4 % (8/2/2022)  Screening Not Indicated  History Diabetes   Cholesterol Screening Once every 5 years if you don't have a lipid disorder  May order more often based on risk factors  Lipid panel: 08/02/2022    Screening Not Indicated  History Lipid Disorder     Other Preventive Screenings Covered by Medicare:  6  Abdominal Aortic Aneurysm (AAA) Screening: covered once if your at risk  You're considered to be at risk if you have a family history of AAA    7  Lung Cancer Screening: covers low dose CT scan once per year if you meet all of the following conditions: (1) Age 50-69; (2) No signs or symptoms of lung cancer; (3) Current smoker or have quit smoking within the last 15 years; (4) You have a tobacco smoking history of at least 20 pack years (packs per day multiplied by number of years you smoked); (5) You get a written order from a healthcare provider  8  Glaucoma Screening: covered annually if you're considered high risk: (1) You have diabetes OR (2) Family history of glaucoma OR (3)  aged 48 and older OR (3)  American aged 72 and older  5  Osteoporosis Screening: covered every 2 years if you meet one of the following conditions: (1) You're estrogen deficient and at risk for osteoporosis based off medical history and other findings; (2) Have a vertebral abnormality; (3) On glucocorticoid therapy for more than 3 months; (4) Have primary hyperparathyroidism; (5) On osteoporosis medications and need to assess response to drug therapy  · Last bone density test (DXA Scan): Not on file  10  HIV Screening: covered annually if you're between the age of 12-76  Also covered annually if you are younger than 13 and older than 72 with risk factors for HIV infection  For pregnant patients, it is covered up to 3 times per pregnancy      Immunizations:  Immunization Recommendations   Influenza Vaccine Annual influenza vaccination during flu season is recommended for all persons aged >= 6 months who do not have contraindications   Pneumococcal Vaccine   * Pneumococcal conjugate vaccine = PCV13 (Prevnar 13), PCV15 (Vaxneuvance), PCV20 (Prevnar 20)  * Pneumococcal polysaccharide vaccine = PPSV23 (Pneumovax) Adults 25-60 years old: 1-3 doses may be recommended based on certain risk factors  Adults 72 years old: 1-2 doses may be recommended based off what pneumonia vaccine you previously received   Hepatitis B Vaccine 3 dose series if at intermediate or high risk (ex: diabetes, end stage renal disease, liver disease)   Tetanus (Td) Vaccine - COST NOT COVERED BY MEDICARE PART B Following completion of primary series, a booster dose should be given every 10 years to maintain immunity against tetanus  Td may also be given as tetanus wound prophylaxis  Tdap Vaccine - COST NOT COVERED BY MEDICARE PART B Recommended at least once for all adults  For pregnant patients, recommended with each pregnancy  Shingles Vaccine (Shingrix) - COST NOT COVERED BY MEDICARE PART B  2 shot series recommended in those aged 48 and above     Health Maintenance Due:      Topic Date Due    DXA SCAN  Never done    Cervical Cancer Screening  Never done    Breast Cancer Screening: Mammogram  04/24/2009    Hepatitis C Screening  Completed     Immunizations Due:      Topic Date Due    Pneumococcal Vaccine: 65+ Years (1 - PCV) Never done    COVID-19 Vaccine (4 - Booster for Pfizer series) 04/09/2022    Influenza Vaccine (1) 09/01/2022     Advance Directives   What are advance directives? Advance directives are legal documents that state your wishes and plans for medical care  These plans are made ahead of time in case you lose your ability to make decisions for yourself  Advance directives can apply to any medical decision, such as the treatments you want, and if you want to donate organs  What are the types of advance directives? There are many types of advance directives, and each state has rules about how to use them  You may choose a combination of any of the following:  · Living will: This is a written record of the treatment you want  You can also choose which treatments you do not want, which to limit, and which to stop at a certain time  This includes surgery, medicine, IV fluid, and tube feedings  · Durable power of  for healthcare [facility]): This is a written record that states who you want to make healthcare choices for you when you are unable to make them for yourself  This person, called a proxy, is usually a family member or a friend  You may choose more than 1 proxy  · Do not resuscitate (DNR) order:  A DNR order is used in case your heart stops beating or you stop breathing   It is a request not to have certain forms of treatment, such as CPR  A DNR order may be included in other types of advance directives  · Medical directive: This covers the care that you want if you are in a coma, near death, or unable to make decisions for yourself  You can list the treatments you want for each condition  Treatment may include pain medicine, surgery, blood transfusions, dialysis, IV or tube feedings, and a ventilator (breathing machine)  · Values history: This document has questions about your views, beliefs, and how you feel and think about life  This information can help others choose the care that you would choose  Why are advance directives important? An advance directive helps you control your care  Although spoken wishes may be used, it is better to have your wishes written down  Spoken wishes can be misunderstood, or not followed  Treatments may be given even if you do not want them  An advance directive may make it easier for your family to make difficult choices about your care  Weight Management   Why it is important to manage your weight:  Being overweight increases your risk of health conditions such as heart disease, high blood pressure, type 2 diabetes, and certain types of cancer  It can also increase your risk for osteoarthritis, sleep apnea, and other respiratory problems  Aim for a slow, steady weight loss  Even a small amount of weight loss can lower your risk of health problems  How to lose weight safely:  A safe and healthy way to lose weight is to eat fewer calories and get regular exercise  You can lose up about 1 pound a week by decreasing the number of calories you eat by 500 calories each day  Healthy meal plan for weight management:  A healthy meal plan includes a variety of foods, contains fewer calories, and helps you stay healthy  A healthy meal plan includes the following:  · Eat whole-grain foods more often  A healthy meal plan should contain fiber   Fiber is the part of grains, fruits, and vegetables that is not broken down by your body  Whole-grain foods are healthy and provide extra fiber in your diet  Some examples of whole-grain foods are whole-wheat breads and pastas, oatmeal, brown rice, and bulgur  · Eat a variety of vegetables every day  Include dark, leafy greens such as spinach, kale, rox greens, and mustard greens  Eat yellow and orange vegetables such as carrots, sweet potatoes, and winter squash  · Eat a variety of fruits every day  Choose fresh or canned fruit (canned in its own juice or light syrup) instead of juice  Fruit juice has very little or no fiber  · Eat low-fat dairy foods  Drink fat-free (skim) milk or 1% milk  Eat fat-free yogurt and low-fat cottage cheese  Try low-fat cheeses such as mozzarella and other reduced-fat cheeses  · Choose meat and other protein foods that are low in fat  Choose beans or other legumes such as split peas or lentils  Choose fish, skinless poultry (chicken or turkey), or lean cuts of red meat (beef or pork)  Before you cook meat or poultry, cut off any visible fat  · Use less fat and oil  Try baking foods instead of frying them  Add less fat, such as margarine, sour cream, regular salad dressing and mayonnaise to foods  Eat fewer high-fat foods  Some examples of high-fat foods include french fries, doughnuts, ice cream, and cakes  · Eat fewer sweets  Limit foods and drinks that are high in sugar  This includes candy, cookies, regular soda, and sweetened drinks  Exercise:  Exercise at least 30 minutes per day on most days of the week  Some examples of exercise include walking, biking, dancing, and swimming  You can also fit in more physical activity by taking the stairs instead of the elevator or parking farther away from stores  Ask your healthcare provider about the best exercise plan for you        © Copyright [employer] 2018 Information is for End User's use only and may not be sold, redistributed or otherwise used for commercial purposes   All illustrations and images included in CareNotes® are the copyrighted property of [employer]  or [facility] Aramis Aragon

## 2022-08-11 NOTE — TELEPHONE ENCOUNTER
Peace Fuller from [facility] called     She states that Medicare will not pay for patient's lower limb arterial duplex with the current diagnosis code, 173 9  She want to know if there is a different code you could try using     Call back number is [phone]

## 2022-08-11 NOTE — TELEPHONE ENCOUNTER
Patient's pharmacy called with questions about the script they received for her for Lantus today   The script is for vials of insulin however she has always gotten a pen  They wanted to check with you before filling it to make sure it is correct

## 2022-08-11 NOTE — TELEPHONE ENCOUNTER
Patient said she forgot to talk to you about her sore thumb at her visit today because you two were talking about other things    She would like to know what you advise

## 2022-08-11 NOTE — PROGRESS NOTES
Assessment and Plan:     Problem List Items Addressed This Visit        Endocrine    Type 2 diabetes mellitus without complication, without long-term current use of insulin ([facility] [address] )       Lab Results   Component Value Date    HGBA1C 9 4 (H) 08/02/2022   BGs worse  Increase Lantus to 40u qD  Restart repaglinide 0 5mg tid ac  Monitor home BGs  Recheck labs in 3m         Relevant Medications    Lantus SoloStar 100 units/mL SOPN    insulin glargine (LANTUS) 100 units/mL subcutaneous injection    repaglinide (PRANDIN) 0 5 mg tablet       Cardiovascular and Mediastinum    3-vessel CAD     Asymptomatic  F/u with Cardio  Continue present care  Recheck 6m         Carotid stenosis, right     Recent carotid duplex revealed occluded R carotid and 50-69% occlusion of L  Continue present meds  F/u with vascular surgery  Recheck 6m         Hypertension     Well controlled  Cont present treatment  Monitor labs  Recheck 6m           Relevant Medications    losartan (COZAAR) 50 mg tablet       Other    Claudication (HCC)     I could not appreciate popliteal or pedal pulses though feet were warm  Vasc surgery exam in June notes 1+ pulses bilat, though pt's symptoms started after that visit  Check lower extremity arterial duplex  Recheck after duplex         Relevant Orders    VAS lower limb arterial duplex, complete bilateral    Rales     Unclear cause  No worsening SOB  Check CXR         Relevant Orders    XR chest pa & lateral    Restless leg     I reviewed with pt  Will try to increase Mirapex to 0 75mg qd  I reviewed side effects  Recheck 2-3w if not improving         Relevant Medications    pramipexole (MIRAPEX) 0 75 MG tablet      Other Visit Diagnoses     Medicare annual wellness visit, subsequent    -  Primary           Preventive health issues were discussed with patient, and age appropriate screening tests were ordered as noted in patient's After Visit Summary    Personalized health advice and appropriate referrals for health education or preventive services given if needed, as noted in patient's After Visit Summary  History of Present Illness:     Patient presents for a Medicare Wellness Visit    f/u multiple med issues and AWV  - L thumb base pain x 1 week  No trauma or overuse  - pt started with her leg "kicking" again  Pt was doing a little better after we increased the dose of Mirapex, but worsened again in June  Has trouble sleeping due to this  - mood sl worse  Lost her best friend as well as her sister in June  - chest discomfort has resolved  Denies CP, palpitations, lightheadedness or other CV symptoms with or without exertion  - pt needs to have cataract surgery  Awaiting cardiac clearance  Pt is doing nutritional classes  - pt has been having increased constipation  No other Gi orGU complaints  - pt still working on her diet in re; to her DMII  A1C last week as 9 4  Morning typically low 200  Evening is labile  Given her constipation, pt agrees to retry replaginide  - pt notes increased lower leg discomfort  Has pain in calf area with exertion/ambulation over the last 1-2m  No hx of PAD but does have CAD and carotid disease  - AWV done       Patient Care Team:  Car Diggs MD as PCP - General  Val Wong MD     Review of Systems:     Review of Systems   Constitutional: Negative  HENT: Negative  Eyes: Positive for visual disturbance (needs cataract surgery)  Respiratory: Negative  Cardiovascular: Negative  Gastrointestinal: Positive for constipation  Negative for abdominal distention, abdominal pain, diarrhea, nausea and vomiting  Endocrine: Negative  Genitourinary: Negative  Musculoskeletal: Positive for arthralgias, gait problem and myalgias  Negative for joint swelling  Skin: Negative  Allergic/Immunologic: Negative  Neurological: Negative for dizziness, weakness, light-headedness, numbness and headaches  Hematological: Negative      Psychiatric/Behavioral: Positive for dysphoric mood and sleep disturbance  Negative for decreased concentration and suicidal ideas  The patient is nervous/anxious  Problem List:     Patient Active Problem List   Diagnosis    Anxiety disorder    Neck pain    Elevated ALT measurement    Elevated AST (SGOT)    Mixed hyperlipidemia    Hypertension    Recurrent major depressive disorder, in partial remission ([place] [address] )    Heart block AV second degree    Type 2 diabetes mellitus without complication, without long-term current use of insulin ([place] [address] )    Pacemaker    Health care maintenance    Heart block AV complete ([place] [address] )    Carotid stenosis, right    Tobacco abuse    3-vessel CAD    Acute blood loss anemia    CVA (cerebral vascular accident) ([place] [address] )    Dysphagia    Myocardiopathy ([place] [address] )    S/P CABG (coronary artery bypass graft)    Cardiomyopathy, ischemic    Rales    Claudication ([place] [address] )    Restless leg      Past Medical and Surgical History:     Past Medical History:   Diagnosis Date    Gastroenteritis      Past Surgical History:   Procedure Laterality Date    ATRIAL CARDIAC PACEMAKER INSERTION        Family History:     Family History   Problem Relation Age of Onset    Diabetes Sister     Dementia Sister       Social History:     Social History     Socioeconomic History    Marital status:       Spouse name: None    Number of children: None    Years of education: None    Highest education level: None   Occupational History    None   Tobacco Use    Smoking status: Former Smoker    Smokeless tobacco: Never Used   Substance and Sexual Activity    Alcohol use: Not Currently    Drug use: Never    Sexual activity: None   Other Topics Concern    None   Social History Narrative    None     Social Determinants of Health     Financial Resource Strain: Low Risk     Difficulty of Paying Living Expenses: Not very hard   Food Insecurity: Not on file   Transportation Needs: No Transportation Needs    Lack of Transportation (Medical): No    Lack of Transportation (Non-Medical): No   Physical Activity: Not on file   Stress: Not on file   Social Connections: Not on file   Intimate Partner Violence: Not on file   Housing Stability: Not on file      Medications and Allergies:     Current Outpatient Medications   Medication Sig Dispense Refill    amLODIPine (NORVASC) 5 mg tablet Take 1 tablet (5 mg total) by mouth daily 90 tablet 3    ascorbic acid (VITAMIN C) 500 mg tablet Take by mouth daily      atenolol (TENORMIN) 25 mg tablet Take 1 tablet (25 mg total) by mouth daily 90 tablet 0    betamethasone, augmented, (DIPROLENE) 0 05 % ointment APPLYTWICE DAILY TO AFFECTED AREA ON LEG FOR 2 WEEKS THEN REDUCE      (REFER TO PRESCRIPTION NOTES)   Blood Glucose Monitoring Suppl (Contour Next Monitor) w/Device KIT Use bid as directed 1 kit 1    chlordiazePOXIDE (LIBRIUM) 5 mg capsule Take 1 capsule (5 mg total) by mouth 2 (two) times a day 60 capsule 0    Droplet Pen Needles 32G X 4 MM MISC USE ONCE DAILY FOR INSULIN INJECTION      ezetimibe (ZETIA) 10 mg tablet Take 10 mg by mouth daily      fluticasone (FLONASE) 50 mcg/act nasal spray 2 sprays into each nostril daily 2 sprays bilat qd 1 Bottle 2    glucose blood (Contour Next Test) test strip Patient tests blood sugars twice daily 100 each 5    hydrocortisone 2 5 % cream MIX PEA SIZED DOSE WITH KETOCONAZOLE CREAM AND APPLY UNDER BREAST     (REFER TO PRESCRIPTION NOTES)        hydrocortisone-pramoxine (PROCTOFOAM-HC) 1-1 % FOAM rectal foam Insert 1 applicator into the rectum 2 (two) times a day 20 g 1    imipramine (TOFRANIL) 25 mg tablet Take 1 tablet (25 mg total) by mouth 2 (two) times a day 180 tablet 3    insulin glargine (LANTUS) 100 units/mL subcutaneous injection Inject 40 Units under the skin daily 12 mL 3    ketoconazole (NIZORAL) 2 % cream APPLY TOPICALLY TWICE DAILY UNTIL RASH CLEARS UNDER BREASTS      Lantus SoloStar 100 units/mL SOPN Inject 30 Units as directed daily      losartan (COZAAR) 50 mg tablet Take 50 mg by mouth 2 (two) times a day      Microlet Lancets [facility] [place] tests blood glucose twice daily 100 each 5    mupirocin (BACTROBAN) 2 % ointment Apply topically Three times a day      pantoprazole (PROTONIX) 40 mg tablet take 1 tablet by mouth twice a day 30 TO 60 MINUTES BEFORE BREAKFAST AND DINNER      pramipexole (MIRAPEX) 0 75 MG tablet 1 tab 1 TO 2 HOURS BEFORE BEDTIME 30 tablet 1    Promethazine-DM (PHENERGAN-DM) 6 25-15 mg/5 mL oral syrup Take 5 mL by mouth 4 (four) times a day as needed for cough 150 mL 0    RA Aspirin EC 81 MG EC tablet take 1 tablet by mouth once daily 90 tablet 0    repaglinide (PRANDIN) 0 5 mg tablet Take 1 tablet (0 5 mg total) by mouth 3 (three) times a day before meals 90 tablet 1    triamcinolone (KENALOG) 0 1 % cream Apply topically 2 (two) times a day 30 g 0    Vascepa 1 g CAPS Take 2 capsules by mouth 2 (two) times a day      vitamin E 100 UNIT capsule Take 800 Units by mouth      Vitamin E 400 units TABS Take 2 tablets by mouth daily       No current facility-administered medications for this visit  Allergies   Allergen Reactions    Atorvastatin      Reaction Date: 23Dec2011; Unknown reaction  LFT increased    Clarithromycin      Reaction Date: 23Dec2011;   Patient does not recall reaction    Hydrogen Peroxide Swelling     Reaction Date: 23Dec2011;   Swelling in the mouth    Levofloxacin      Patient does not recall her reaction    Other Swelling     Perioxol (mouth rinse)  Perioxol (mouth rinse)    Penicillins Hives and Swelling    Rosuvastatin      Reaction Date: 23Dec2011;    Unknown reaction per patient  Increase LFT      Immunizations:     Immunization History   Administered Date(s) Administered    COVID-19 PFIZER VACCINE 0 3 ML IM 02/17/2021, 03/10/2021, 12/09/2021    Tuberculin Skin Test-PPD Intradermal 04/14/2008      Health Maintenance:         Topic Date Due    DXA SCAN  Never done    Cervical Cancer Screening  Never done    Breast Cancer Screening: Mammogram  04/24/2009    Hepatitis C Screening  Completed         Topic Date Due    Pneumococcal Vaccine: 65+ Years (1 - PCV) Never done    COVID-19 Vaccine (4 - Booster for Pfizer series) 04/09/2022    Influenza Vaccine (1) 09/01/2022      Medicare Screening Tests and Risk Assessments:     Mell Dhillon is here for her Subsequent Wellness visit  Health Risk Assessment:   Patient rates overall health as good  Patient feels that their physical health rating is same  Patient is satisfied with their life  Eyesight was rated as same  Hearing was rated as same  Patient feels that their emotional and mental health rating is same  Patients states they are never, rarely angry  Patient states they are sometimes unusually tired/fatigued  Pain experienced in the last 7 days has been some  Patient's pain rating has been 4/10  Patient states that she has experienced no weight loss or gain in last 6 months  L thumb    Depression Screening:   PHQ-9 Score: 4      Fall Risk Screening: In the past year, patient has experienced: no history of falling in past year      Urinary Incontinence Screening:   Patient has not leaked urine accidently in the last six months  Home Safety:  Patient does not have trouble with stairs inside or outside of their home  Patient has working smoke alarms and has working carbon monoxide detector  Home safety hazards include: none  Nutrition:   Current diet is Regular and Diabetic  Medications:   Patient is currently taking over-the-counter supplements  OTC medications include: see medication list  Patient is able to manage medications  Activities of Daily Living (ADLs)/Instrumental Activities of Daily Living (IADLs):   Walk and transfer into and out of bed and chair?: Yes  Dress and groom yourself?: Yes    Bathe or shower yourself?: Yes    Feed yourself?  Yes  Do your laundry/housekeeping?: Yes  Manage your money, pay your bills and track your expenses?: Yes  Make your own meals?: Yes    Do your own shopping?: Yes    Previous Hospitalizations:   Any hospitalizations or ED visits within the last 12 months?: No      Advance Care Planning:   Living will: Yes    Durable POA for healthcare:  Yes    Advanced directive: Yes    Advanced directive counseling given: Yes      Cognitive Screening:   Provider or family/friend/caregiver concerned regarding cognition?: No    PREVENTIVE SCREENINGS      Cardiovascular Screening:    General: Screening Not Indicated and History Lipid Disorder      Diabetes Screening:     General: Screening Not Indicated and History Diabetes      Colorectal Cancer Screening:     General: Patient Declines      Breast Cancer Screening:     General: Patient Declines      Cervical Cancer Screening:    General: Screening Not Indicated      Osteoporosis Screening:    General: Patient Declines      Abdominal Aortic Aneurysm (AAA) Screening:        General: Screening Not Indicated      Lung Cancer Screening:     General: Screening Not Indicated      Hepatitis C Screening:    General: Screening Current    Screening, Brief Intervention, and Referral to Treatment (SBIRT)    Screening    Typical number of drinks in a week: 0    AUDIT-C Screenin) How often did you have a drink containing alcohol in the past year? never  2) How many drinks did you have on a typical day when you were drinking in the past year? 0  3) How often did you have 6 or more drinks on one occasion in the past year? never    AUDIT-C Score: 0  Interpretation: Score 0-2 (female): Negative screen for alcohol misuse    Single Item Drug Screening:  How often have you used an illegal drug (including marijuana) or a prescription medication for non-medical reasons in the past year? never    Single Item Drug Screen Score: 0  Interpretation: Negative screen for possible drug use disorder    Other Counseling Topics:   Calcium and vitamin D intake and regular weightbearing exercise  No exam data present     Physical Exam:     /78   Pulse 74   Temp 98 3 °F (36 8 °C)   Ht 5' (1 524 m)   Wt 62 6 kg (138 lb)   BMI 26 95 kg/m²     Physical Exam  Vitals reviewed  Constitutional:       Appearance: She is well-developed  She is not diaphoretic  HENT:      Head: Normocephalic and atraumatic  Right Ear: Tympanic membrane, ear canal and external ear normal       Left Ear: Tympanic membrane, ear canal and external ear normal       Nose: No congestion  Mouth/Throat:      Mouth: Mucous membranes are moist       Pharynx: No oropharyngeal exudate  Eyes:      Extraocular Movements: Extraocular movements intact  Conjunctiva/sclera: Conjunctivae normal       Pupils: Pupils are equal, round, and reactive to light  Comments: bilat cataracts   Neck:      Thyroid: No thyromegaly  Vascular: No JVD  Cardiovascular:      Rate and Rhythm: Normal rate and regular rhythm  Pulses: Pulses are weak  Dorsalis pedis pulses are 0 on the right side and 0 on the left side  Posterior tibial pulses are 0 on the right side and 0 on the left side  Heart sounds: Normal heart sounds  No murmur heard  Comments: Unable to palpate popliteal or pedal pulses  Feet are warm  Pulmonary:      Effort: Pulmonary effort is normal       Breath sounds: Rales (rales in R base) present  No wheezing  Abdominal:      General: There is no distension  Palpations: Abdomen is soft  There is no mass  Tenderness: There is no abdominal tenderness  Musculoskeletal:         General: Tenderness (mmild tenderness over L thumb nase) and deformity (mild diffuse OA changes  (+) TTP over the L thumb base) present  Normal range of motion  Cervical back: Normal range of motion and neck supple  No tenderness  Right lower leg: No edema  Left lower leg: No edema     Feet:      Right foot:      Skin integrity: No ulcer, skin breakdown, erythema, warmth, callus or dry skin  Left foot:      Skin integrity: No ulcer, skin breakdown, erythema, warmth, callus or dry skin  Lymphadenopathy:      Cervical: No cervical adenopathy  Skin:     Capillary Refill: Capillary refill takes less than 2 seconds  Comments: Sebaceous cyst under SK on R parietal scalp   Neurological:      Mental Status: She is alert and oriented to person, place, and time  Cranial Nerves: No cranial nerve deficit  Motor: No abnormal muscle tone  Coordination: Coordination normal       Deep Tendon Reflexes: Reflexes are normal and symmetric  Psychiatric:         Behavior: Behavior normal          Thought Content: Thought content normal          Judgment: Judgment normal         Patient's shoes and socks removed  Right Foot/Ankle   Right Foot Inspection  Skin Exam: skin normal and skin intact  No dry skin, no warmth, no callus, no erythema, no maceration, no abnormal color, no pre-ulcer, no ulcer and no callus  Toe Exam: ROM and strength within normal limits  Sensory   Vibration: intact  Monofilament testing: intact    Vascular  Capillary refills: < 3 seconds  The right DP pulse is 0  The right PT pulse is 0  Left Foot/Ankle  Left Foot Inspection  Skin Exam: skin normal and skin intact  No dry skin, no warmth, no erythema, no maceration, normal color, no pre-ulcer, no ulcer and no callus  Toe Exam: ROM and strength within normal limits  Sensory   Vibration: intact  Monofilament testing: intact    Vascular  Capillary refills: < 3 seconds  The left DP pulse is 0  The left PT pulse is 0       Assign Risk Category  No deformity present  No loss of protective sensation  Weak pulses  Risk: 0      Katty Alvarez MD

## 2022-08-11 NOTE — TELEPHONE ENCOUNTER
OK to use Tylenol for thumb pain  If pain persists, we should get xrays   I want pt to rest it over the weekend and call Monday with follow up

## 2022-08-13 ENCOUNTER — TELEPHONE (OUTPATIENT)
Dept: OTHER | Facility: OTHER | Age: 77
End: 2022-08-13

## 2022-08-13 NOTE — TELEPHONE ENCOUNTER
Please fax Ultrasound orders to [facility]  Patient is scheduling with [facility]      Fax # [phone]

## 2022-08-15 DIAGNOSIS — I73.9 CLAUDICATION IN PERIPHERAL VASCULAR DISEASE (HCC): Primary | ICD-10-CM

## 2022-08-15 PROBLEM — R09.89 RALES: Status: ACTIVE | Noted: 2022-08-15

## 2022-08-15 PROBLEM — G25.81 RESTLESS LEG: Status: ACTIVE | Noted: 2022-08-15

## 2022-08-15 PROBLEM — G93.40 ENCEPHALOPATHY: Status: RESOLVED | Noted: 2022-01-29 | Resolved: 2022-08-15

## 2022-08-15 NOTE — ASSESSMENT & PLAN NOTE
Recent carotid duplex revealed occluded R carotid and 50-69% occlusion of L  Continue present meds  F/u with vascular surgery   Recheck 6m

## 2022-08-15 NOTE — ASSESSMENT & PLAN NOTE
I could not appreciate popliteal or pedal pulses though feet were warm  Vasc surgery exam in June notes 1+ pulses bilat, though pt's symptoms started after that visit  Check lower extremity arterial duplex   Recheck after duplex

## 2022-08-15 NOTE — ASSESSMENT & PLAN NOTE
Lab Results   Component Value Date    HGBA1C 9 4 (H) 08/02/2022   BGs worse  Increase Lantus to 40u qD  Restart repaglinide 0 5mg tid ac  Monitor home BGs   Recheck labs in

## 2022-08-15 NOTE — ASSESSMENT & PLAN NOTE
I reviewed with pt  Will try to increase Mirapex to 0 75mg qd  I reviewed side effects   Recheck 2-3w if not improving

## 2022-08-15 NOTE — TELEPHONE ENCOUNTER
I have called twice to speak with the person at   She is not returning my calls, however we received a message over the weekend requesting us to fax a new order to them   Can you reorder with [id number] as diagnosis and I will fax it Active smoker: 1ppd cigarette use for "many years", cocaine use, alcohol abuse, marijuana use  -Utox neg  -c/w home nicotine gum and nicotine patch

## 2022-08-19 ENCOUNTER — TELEPHONE (OUTPATIENT)
Dept: FAMILY MEDICINE CLINIC | Facility: CLINIC | Age: 77
End: 2022-08-19

## 2022-08-19 NOTE — TELEPHONE ENCOUNTER
Patient has upcoming cataract surgery     12/21 right eye and 02/4 left eye    She needs a pre op appointment sometime between 12/4-12/7    Patient is asking to cancel her 3 m fu appoitment in November and reschedule it in between those dates instead so she can get the pre op clearance      Please advise where to put her on the schedule

## 2022-08-31 DIAGNOSIS — F41.9 ANXIETY: ICD-10-CM

## 2022-08-31 RX ORDER — CHLORDIAZEPOXIDE HYDROCHLORIDE 5 MG/1
5 CAPSULE, GELATIN COATED ORAL 2 TIMES DAILY
Qty: 60 CAPSULE | Refills: 0 | Status: SHIPPED | OUTPATIENT
Start: 2022-08-31 | End: 2022-09-26 | Stop reason: SDUPTHER

## 2022-08-31 NOTE — TELEPHONE ENCOUNTER
08/02/2022 08/01/2022 chlordiazePOXIDE HCL (Capsule)  60 0 30 5 MG NA DANNY CHOWDARY POGODZINSKI  THRIFT

## 2022-09-26 DIAGNOSIS — F41.9 ANXIETY: ICD-10-CM

## 2022-09-26 RX ORDER — CHLORDIAZEPOXIDE HYDROCHLORIDE 5 MG/1
5 CAPSULE, GELATIN COATED ORAL 2 TIMES DAILY
Qty: 60 CAPSULE | Refills: 0 | Status: SHIPPED | OUTPATIENT
Start: 2022-09-26 | End: 2022-10-27 | Stop reason: SDUPTHER

## 2022-09-26 NOTE — TELEPHONE ENCOUNTER
08/31/2022 08/31/2022 chlordiazePOXIDE HCL (Capsule)  60 0 30 5 MG NA DANNY CHOWDARY POGODZINSKI  THRIFT 5

## 2022-10-03 DIAGNOSIS — G25.81 RESTLESS LEG: ICD-10-CM

## 2022-10-03 DIAGNOSIS — E11.9 TYPE 2 DIABETES MELLITUS WITHOUT COMPLICATION, WITHOUT LONG-TERM CURRENT USE OF INSULIN (HCC): ICD-10-CM

## 2022-10-03 RX ORDER — REPAGLINIDE 0.5 MG/1
TABLET ORAL
Qty: 90 TABLET | Refills: 1 | Status: SHIPPED | OUTPATIENT
Start: 2022-10-03

## 2022-10-03 RX ORDER — PRAMIPEXOLE DIHYDROCHLORIDE 0.75 MG/1
TABLET ORAL
Qty: 30 TABLET | Refills: 1 | Status: SHIPPED | OUTPATIENT
Start: 2022-10-03

## 2022-10-04 ENCOUNTER — TELEPHONE (OUTPATIENT)
Dept: FAMILY MEDICINE CLINIC | Facility: CLINIC | Age: 77
End: 2022-10-04

## 2022-10-04 NOTE — TELEPHONE ENCOUNTER
She should be on Lantus, 40u  She can take 45u today  Hopefully the effect of the shot will only be for a couple of days and her sugars should come down soon   Let me know tomorrow how they are doing

## 2022-10-04 NOTE — TELEPHONE ENCOUNTER
Patient called to let you know that her R knee pain became unbearable so she got a Cortizone shot from her orthopedic yesterday    She states she was hesitant to get the shot because she knew her sugar would go high and her numbers have been really good lately    She states that since she got the shot, her numbers have evan up higher and higher    Yesterday they were in the 200's and this morning she was over 300    Please advise if you would like to increase her insulin units and for how long

## 2022-10-05 NOTE — TELEPHONE ENCOUNTER
Patient called in with her bg readings      10/3 208,[phone]  10/4 283, 311, 195,170  10/5 161    Patient states she is not sleeping at all    She states she is so full of anxiety and she doesn't know why   She states she is extremely restless and wakes up all night long    Patient is upset and crying as she is telling me this    She is asking what she can take to help her sleep

## 2022-10-06 ENCOUNTER — OFFICE VISIT (OUTPATIENT)
Dept: FAMILY MEDICINE CLINIC | Facility: CLINIC | Age: 77
End: 2022-10-06
Payer: MEDICARE

## 2022-10-06 VITALS
BODY MASS INDEX: 26.9 KG/M2 | TEMPERATURE: 96.7 F | HEIGHT: 60 IN | DIASTOLIC BLOOD PRESSURE: 76 MMHG | HEART RATE: 80 BPM | OXYGEN SATURATION: 96 % | RESPIRATION RATE: 16 BRPM | SYSTOLIC BLOOD PRESSURE: 126 MMHG | WEIGHT: 137 LBS

## 2022-10-06 DIAGNOSIS — E11.9 TYPE 2 DIABETES MELLITUS WITHOUT COMPLICATION, WITHOUT LONG-TERM CURRENT USE OF INSULIN (HCC): Primary | ICD-10-CM

## 2022-10-06 DIAGNOSIS — F51.01 PRIMARY INSOMNIA: ICD-10-CM

## 2022-10-06 DIAGNOSIS — G25.81 RESTLESS LEG: ICD-10-CM

## 2022-10-06 DIAGNOSIS — F41.1 GENERALIZED ANXIETY DISORDER: ICD-10-CM

## 2022-10-06 PROCEDURE — 99214 OFFICE O/P EST MOD 30 MIN: CPT | Performed by: FAMILY MEDICINE

## 2022-10-06 NOTE — PROGRESS NOTES
Name: Indio Bahena      : 1945      MRN: [id number]  Encounter Provider: Rahat Carias MD  Encounter Date: 10/6/2022   Encounter department: [address] [place] [address] Road     1  Type 2 diabetes mellitus without complication, without long-term current use of insulin [facility])  Assessment & Plan:    Lab Results   Component Value Date    HGBA1C 9 4 (H) 2022   Reviewed with patient  Home blood sugars were much better until she had her knee injected  Blood sugars did spike for sure  But now seem to be improving  Continue present treatment  Urged dietary compliance  Explained that she can take her Prandin after eating if she does not take it before her meals  Recheck 1-2 months      2  Restless leg  Assessment & Plan:  Patient is not sure that Mirapex is doing much at this point however will try to address her insomnia and see if the restless leg improves as well  Start melatonin as above  Recheck 4-6 weeks      3  Generalized anxiety disorder  Assessment & Plan:  I reviewed with patient  She refuses changes in present medications or addition of any other meds at this point  Patient would like to address her insomnia and watch  Recheck 1-2 months      4  Primary insomnia  Assessment & Plan:  Reviewed with patient  She feels that this is the reason for her worsening anxiety  Patient is refusing medications for insomnia at this point I would like to try melatonin  Will recommend that she started a low dose and slowly increase every 2-3 days as directed  Recheck 4 weeks if not improved-earlier if worse             Subjective      Pt here for several concerns  - had been having increased R knee pain for several weeks  Pt was given injection on Monday by ortho which may have heped somewhat  - blood sugars increased after the injeciton, but have improved after a day or two    Pt notes that before her injection, they were running 130-180 fasting and 140-200 nonfasting  Pt has been compliant with meds and is trying to monitor diet   - pt notes increased worsening anxiety and insomnia  Her insomnia is the primary problem  Patient feels that if she slept better, her anxiety would be better as well  She is looking for ways to improve her sleep is not necessary looking for another anxiety med  - pt denies any new CV issues  Pt is up to date with Cardio  - no new GI or  issues    Review of Systems   Constitutional: Positive for activity change and fatigue  Negative for appetite change, chills and fever  HENT: Negative  Eyes: Positive for visual disturbance (needs cataract surgery)  Respiratory: Negative  Cardiovascular: Negative  Gastrointestinal: Negative  Endocrine: Negative  Genitourinary: Negative  Musculoskeletal: Positive for arthralgias, gait problem and myalgias  Negative for joint swelling  Skin: Negative  Allergic/Immunologic: Negative  Neurological: Negative for dizziness, weakness, light-headedness, numbness and headaches  Hematological: Negative  Psychiatric/Behavioral: Positive for sleep disturbance  Negative for decreased concentration, dysphoric mood, self-injury and suicidal ideas  The patient is nervous/anxious  Current Outpatient Medications on File Prior to Visit   Medication Sig   • amLODIPine (NORVASC) 5 mg tablet Take 1 tablet (5 mg total) by mouth daily   • ascorbic acid (VITAMIN C) 500 mg tablet Take by mouth daily   • atenolol (TENORMIN) 25 mg tablet Take 1 tablet (25 mg total) by mouth daily   • betamethasone, augmented, (DIPROLENE) 0 05 % ointment APPLYTWICE DAILY TO AFFECTED AREA ON LEG FOR 2 WEEKS THEN REDUCE      (REFER TO PRESCRIPTION NOTES)     • Blood Glucose Monitoring Suppl (Contour Next Monitor) w/Device KIT Use bid as directed   • chlordiazePOXIDE (LIBRIUM) 5 mg capsule Take 1 capsule (5 mg total) by mouth 2 (two) times a day   • Droplet Pen Needles 32G X 4 MM MISC USE ONCE DAILY FOR INSULIN INJECTION   • ezetimibe (ZETIA) 10 mg tablet Take 10 mg by mouth daily   • fluticasone (FLONASE) 50 mcg/act nasal spray 2 sprays into each nostril daily 2 sprays bilat qd   • glucose blood (Contour Next Test) test strip Patient tests blood sugars twice daily   • hydrocortisone 2 5 % cream MIX PEA SIZED DOSE WITH KETOCONAZOLE CREAM AND APPLY UNDER BREAST     (REFER TO PRESCRIPTION NOTES)  • imipramine (TOFRANIL) 25 mg tablet Take 1 tablet (25 mg total) by mouth 2 (two) times a day   • insulin glargine (LANTUS) 100 units/mL subcutaneous injection Inject 40 Units under the skin daily   • ketoconazole (NIZORAL) 2 % cream APPLY TOPICALLY TWICE DAILY UNTIL RASH CLEARS UNDER BREASTS   • losartan (COZAAR) 50 mg tablet Take 50 mg by mouth 2 (two) times a day   • Microlet Lancets MISC Patient tests blood glucose twice daily   • mupirocin (BACTROBAN) 2 % ointment Apply topically Three times a day   • pantoprazole (PROTONIX) 40 mg tablet take 1 tablet by mouth twice a day 30 TO 60 MINUTES BEFORE BREAKFAST AND DINNER   • pramipexole (MIRAPEX) 0 75 MG tablet TAKE 1 TABLET BY MOUTH 1 TO 2 HOURS BEFORE BEDTIME   • RA Aspirin EC 81 MG EC tablet take 1 tablet by mouth once daily   • repaglinide (PRANDIN) 0 5 mg tablet take 1 tablet by mouth three times a day BEFORE MEALS   • triamcinolone (KENALOG) 0 1 % cream Apply topically 2 (two) times a day   • Vascepa 1 g CAPS Take 2 capsules by mouth 2 (two) times a day   • vitamin E 100 UNIT capsule Take 800 Units by mouth   • Vitamin E 400 units TABS Take 2 tablets by mouth daily   • Promethazine-DM (PHENERGAN-DM) 6 25-15 mg/5 mL oral syrup Take 5 mL by mouth 4 (four) times a day as needed for cough (Patient not taking: Reported on 10/6/2022)       Objective     /76   Pulse 80   Temp (!) 96 7 °F (35 9 °C)   Resp 16   Ht 5' (1 524 m)   Wt 62 1 kg (137 lb)   SpO2 96%   BMI 26 76 kg/m²     Physical Exam  Vitals reviewed  Constitutional:       Appearance: She is well-developed  She is not diaphoretic  HENT:      Head: Normocephalic and atraumatic  Mouth/Throat:      Mouth: Mucous membranes are moist    Eyes:      Extraocular Movements: Extraocular movements intact  Pupils: Pupils are equal, round, and reactive to light  Comments: ? Sl conjunctival pallor? Neck:      Thyroid: No thyromegaly  Vascular: No JVD  Cardiovascular:      Rate and Rhythm: Normal rate and regular rhythm  Pulmonary:      Effort: Pulmonary effort is normal       Breath sounds: Normal breath sounds  Abdominal:      General: There is no distension  Palpations: Abdomen is soft  There is no mass  Tenderness: There is no abdominal tenderness  Musculoskeletal:         General: Deformity (diffuse OA changes) present  No swelling or tenderness  Normal range of motion  Cervical back: Normal range of motion and neck supple  No tenderness  No muscular tenderness  Right lower leg: Edema (trace) present  Left lower leg: Edema (trace) present  Lymphadenopathy:      Cervical: No cervical adenopathy  Skin:     General: Skin is warm and dry  Capillary Refill: Capillary refill takes less than 2 seconds  Neurological:      Mental Status: She is alert and oriented to person, place, and time  Cranial Nerves: No cranial nerve deficit  Sensory: No sensory deficit  Motor: No weakness or abnormal muscle tone  Gait: Gait normal       Deep Tendon Reflexes: Reflexes are normal and symmetric  Psychiatric:         Thought Content:  Thought content normal          Judgment: Judgment normal       Comments: (+) sleep disturbance (insomnia) and persistent anxiety             Rosa Sutherland MD

## 2022-10-09 PROBLEM — F51.01 PRIMARY INSOMNIA: Status: ACTIVE | Noted: 2022-10-09

## 2022-10-09 NOTE — ASSESSMENT & PLAN NOTE
Lab Results   Component Value Date    HGBA1C 9 4 (H) 08/02/2022   Reviewed with patient  Home blood sugars were much better until she had her knee injected  Blood sugars did spike for sure  But now seem to be improving  Continue present treatment  Urged dietary compliance  Explained that she can take her Prandin after eating if she does not take it before her meals    Recheck 1-2 months

## 2022-10-09 NOTE — ASSESSMENT & PLAN NOTE
Reviewed with patient  She feels that this is the reason for her worsening anxiety  Patient is refusing medications for insomnia at this point I would like to try melatonin  Will recommend that she started a low dose and slowly increase every 2-3 days as directed    Recheck 4 weeks if not improved-earlier if worse

## 2022-10-09 NOTE — ASSESSMENT & PLAN NOTE
I reviewed with patient  She refuses changes in present medications or addition of any other meds at this point  Patient would like to address her insomnia and watch    Recheck 1-2 months

## 2022-10-09 NOTE — ASSESSMENT & PLAN NOTE
Patient is not sure that Mirapex is doing much at this point however will try to address her insomnia and see if the restless leg improves as well  Start melatonin as above    Recheck 4-6 weeks

## 2022-10-14 ENCOUNTER — TELEPHONE (OUTPATIENT)
Dept: FAMILY MEDICINE CLINIC | Facility: CLINIC | Age: 77
End: 2022-10-14

## 2022-10-14 NOTE — TELEPHONE ENCOUNTER
Patient states that the pain in her knee is very bad and she cannot find anyone who will do an MRI on her because she has a pace maker    She is asking if you know of where they would agree to do it

## 2022-10-17 NOTE — TELEPHONE ENCOUNTER
She spoke with cardiologist  It is not MRI safe   She is going to have a CT tomorrow and they gave her pain meds to hold her over

## 2022-10-21 ENCOUNTER — TELEPHONE (OUTPATIENT)
Dept: FAMILY MEDICINE CLINIC | Facility: CLINIC | Age: 77
End: 2022-10-21

## 2022-10-21 NOTE — TELEPHONE ENCOUNTER
Patient called to tell you she has a broken knee     She had a CT scan and the ortho just called her to tell her

## 2022-10-26 ENCOUNTER — TELEPHONE (OUTPATIENT)
Dept: FAMILY MEDICINE CLINIC | Facility: CLINIC | Age: 77
End: 2022-10-26

## 2022-10-26 DIAGNOSIS — Z78.0 ASYMPTOMATIC AGE-RELATED POSTMENOPAUSAL STATE: Primary | ICD-10-CM

## 2022-10-26 NOTE — TELEPHONE ENCOUNTER
Patient called to see if the order for the bone density scan is in her chart     I did not see it   She states she has someone who can come in and pick it up for her today around 10:30 so she is asking if you could please have it put in her chart by then

## 2022-10-27 DIAGNOSIS — F41.9 ANXIETY: ICD-10-CM

## 2022-10-28 RX ORDER — CHLORDIAZEPOXIDE HYDROCHLORIDE 5 MG/1
5 CAPSULE, GELATIN COATED ORAL 2 TIMES DAILY
Qty: 60 CAPSULE | Refills: 0 | Status: SHIPPED | OUTPATIENT
Start: 2022-10-28

## 2022-11-15 ENCOUNTER — TELEPHONE (OUTPATIENT)
Dept: FAMILY MEDICINE CLINIC | Facility: CLINIC | Age: 77
End: 2022-11-15

## 2022-11-15 NOTE — TELEPHONE ENCOUNTER
I would like her to hold the metamucil and start OTC Miralax, 1 packet in 6oz fluid a day  It can take 3d for it to fully kick in   Pt should call Monday with follow up

## 2022-11-15 NOTE — TELEPHONE ENCOUNTER
Called patient back  She did take a dulcolax yesterday and she was able to go today, but she has had 2 instances recently where she tried to go, but the stool was stuck and she had to pull it out of her rectum  She states that she does take metamucil daily  Also, if she takes 2 dulcolax it gives her terrible diarrhea and makes her nauseated

## 2022-11-15 NOTE — TELEPHONE ENCOUNTER
Patient called stating that she has been very constipated     She is asking if Repaglinide, or any of the other meds she is on would cause constipation and what you advise

## 2022-11-21 DIAGNOSIS — Z78.0 ASYMPTOMATIC AGE-RELATED POSTMENOPAUSAL STATE: ICD-10-CM

## 2022-11-25 ENCOUNTER — TELEPHONE (OUTPATIENT)
Dept: FAMILY MEDICINE CLINIC | Facility: CLINIC | Age: 77
End: 2022-11-25

## 2022-11-25 ENCOUNTER — OFFICE VISIT (OUTPATIENT)
Dept: FAMILY MEDICINE CLINIC | Facility: CLINIC | Age: 77
End: 2022-11-25

## 2022-11-25 ENCOUNTER — NURSE TRIAGE (OUTPATIENT)
Dept: OTHER | Facility: OTHER | Age: 77
End: 2022-11-25

## 2022-11-25 VITALS — HEART RATE: 75 BPM | OXYGEN SATURATION: 96 % | TEMPERATURE: 97.6 F

## 2022-11-25 DIAGNOSIS — U07.1 COVID: Primary | ICD-10-CM

## 2022-11-25 DIAGNOSIS — E11.9 TYPE 2 DIABETES MELLITUS WITHOUT COMPLICATION, WITHOUT LONG-TERM CURRENT USE OF INSULIN (HCC): ICD-10-CM

## 2022-11-25 DIAGNOSIS — Z11.52 ENCOUNTER FOR SCREENING FOR COVID-19: Primary | ICD-10-CM

## 2022-11-25 LAB
SARS-COV-2 AG UPPER RESP QL IA: POSITIVE
VALID CONTROL: ABNORMAL

## 2022-11-25 RX ORDER — DEXTROMETHORPHAN HYDROBROMIDE AND PROMETHAZINE HYDROCHLORIDE 15; 6.25 MG/5ML; MG/5ML
5 SOLUTION ORAL 4 TIMES DAILY PRN
Qty: 150 ML | Refills: 0 | Status: SHIPPED | OUTPATIENT
Start: 2022-11-25

## 2022-11-25 RX ORDER — NIRMATRELVIR AND RITONAVIR 300-100 MG
3 KIT ORAL 2 TIMES DAILY
Qty: 30 TABLET | Refills: 0 | Status: SHIPPED | OUTPATIENT
Start: 2022-11-25 | End: 2022-11-30

## 2022-11-25 RX ORDER — REPAGLINIDE 0.5 MG/1
0.5 TABLET ORAL
Qty: 90 TABLET | Refills: 1
Start: 2022-11-25 | End: 2022-12-23 | Stop reason: SDUPTHER

## 2022-11-25 NOTE — PROGRESS NOTES
COVID-19 Outpatient Progress Note    Assessment/Plan:    Problem List Items Addressed This Visit    None  Visit Diagnoses     COVID    -  Primary    Relevant Medications    nirmatrelvir & ritonavir (Paxlovid, 300/100,) tablet therapy pack    Promethazine-DM (PHENERGAN-DM) 6 25-15 mg/5 mL oral syrup         Disposition:     Patient has asymptomatic or mild COVID-19 infection  Based off CDC guidelines, they were recommended to isolate for 5 days  If they are asymptomatic or symptoms are improving with no fevers in the past 24 hours, isolation may be ended followed by 5 days of wearing a mask when around othes to minimize risk of infecting others  If still have a fever or other symptoms have not improved, continue to isolate until they improve  Regardless of when they end isolation, avoid being around people who are more likely to get very sick from COVID-19 until at least day 11  If COVID symptoms worsen after ending isolation, restart isolation at day 0  With two sequential negative antigen tests 48 hours apart, they may remove their mask sooner than day 10  If antigen test remains positive, they may need to continue wearing a mask beyond day 10  Continue taking antigen tests at least 48 hours apart until you have two sequential negative results  Discussed symptom directed medication options with patient  Discussed vitamin D, vitamin C, and/or zinc supplementation with patient  Day 2 of COVID symptoms  Pt meets criteria for Paxlovid  Creat Clearance in Aug = 79  Pt understands that she should hold her amlodipine if she develops lightheadedness  Prometh DM for Cough  Pt can take Vit C 500mg qd, Vit D 1000IU qd, and zinc lozenges as directed on the bottle (depends on the product)  Pt to call Monday with follow up    Patient meets criteria for PAXLOVID and they have been counseled appropriately according to EUA documentation released by the FDA  After discussion, patient agrees to treatment      Sveta Dennison is an investigational medicine used to treat mild-to-moderate COVID-19 in adults and children (15years of age and older weighing at least 80 pounds (40 kg)) with positive results of direct SARS-CoV-2 viral testing, and who are at high risk for progression to severe COVID-19, including hospitalization or death  PAXLOVID is investigational because it is still being studied  There is limited information about the safety and effectiveness of using PAXLOVID to treat people with mild-to-moderate COVID-19  The FDA has authorized the emergency use of PAXLOVID for the treatment of mild-tomoderate COVID-19 in adults and children (15years of age and older weighing at least 80 pounds (40 kg)) with a positive test for the virus that causes COVID-19, and who are at high risk for progression to severe COVID-19, including hospitalization or death, under an EUA  What should I tell my healthcare provider before I take PAXLOVID? Tell your healthcare provider if you:  - Have any allergies  - Have liver or kidney disease  - Are pregnant or plan to become pregnant  - Are breastfeeding a child  - Have any serious illnesses    Tell your healthcare provider about all the medicines you take, including prescription and over-the-counter medicines, vitamins, and herbal supplements  Some medicines may interact with PAXLOVID and may cause serious side effects  Keep a list of your medicines to show your healthcare provider and pharmacist when you get a new medicine  You can ask your healthcare provider or pharmacist for a list of medicines that interact with PAXLOVID  Do not start taking a new medicine without telling your healthcare provider  Your healthcare provider can tell you if it is safe to take PAXLOVID with other medicines  Tell your healthcare provider if you are taking combined hormonal contraceptive  PAXLOVID may affect how your birth control pills work   Females who are able to become pregnant should use another effective alternative form of contraception or an additional barrier method of contraception  Talk to your healthcare provider if you have any questions about contraceptive methods that might be right for you  How do I take PAXLOVID? PAXLOVID consists of 2 medicines: nirmatrelvir and ritonavir  - Take 2 pink tablets of nirmatrelvir with 1 white tablet of ritonavir by mouth 2 times each day (in the morning and in the evening) for 5 days  For each dose, take all 3 tablets at the same time  - If you have kidney disease, talk to your healthcare provider  You may need a different dose  - Swallow the tablets whole  Do not chew, break, or crush the tablets  - Take PAXLOVID with or without food  - Do not stop taking PAXLOVID without talking to your healthcare provider, even if you feel better  - If you miss a dose of PAXLOVID within 8 hours of the time it is usually taken, take it as soon as you remember  If you miss a dose by more than 8 hours, skip the missed dose and take the next dose at your regular time  Do not take 2 doses of PAXLOVID at the same time  - If you take too much PAXLOVID, call your healthcare provider or go to the nearest hospital emergency room right away  - If you are taking a ritonavir- or cobicistat-containing medicine to treat hepatitis C or Human Immunodeficiency Virus (HIV), you should continue to take your medicine as prescribed by your healthcare provider   - Talk to your healthcare provider if you do not feel better or if you feel worse after 5 days  Who should generally not take PAXLOVID? Do not take PAXLOVID if:  You are allergic to nirmatrelvir, ritonavir, or any of the ingredients in PAXLOVID      You are taking any of the following medicines:  - Alfuzosin  - Pethidine, piroxicam, propoxyphene  - Ranolazine  - Amiodarone, dronedarone, flecainide, propafenone, quinidine  - Colchicine  - Lurasidone, pimozide, clozapine  - Dihydroergotamine, ergotamine, methylergonovine  - Lovastatin, simvastatin  - Sildenafil (Revatio®) for pulmonary arterial hypertension (PAH)  - Triazolam, oral midazolam  - Apalutamide  - Carbamazepine, phenobarbital, phenytoin  - Rifampin  - St  Neymar’s Wort (hypericum perforatum)    What are the important possible side effects of PAXLOVID? Possible side effects of PAXLOVID are:  - Liver Problems  Tell your healthcare provider right away if you have any of these signs and symptoms of liver problems: loss of appetite, yellowing of your skin and the whites of eyes (jaundice), dark-colored urine, pale colored stools and itchy skin, stomach area (abdominal) pain  - Resistance to HIV Medicines  If you have untreated HIV infection, PAXLOVID may lead to some HIV medicines not working as well in the future  - Other possible side effects include: altered sense of taste, diarrhea, high blood pressure, or muscle aches    These are not all the possible side effects of PAXLOVID  Not many people have taken PAXLOVID  Serious and unexpected side effects may happen  Mary Hidalgo is still being studied, so it is possible that all of the risks are not known at this time  What other treatment choices are there? Like Cleave Prior may allow for the emergency use of other medicines to treat people with COVID-19  Go to https://[employer] for information on the emergency use of other medicines that are authorized by FDA to treat people with COVID-19  Your healthcare provider may talk with you about clinical trials for which you may be eligible  It is your choice to be treated or not to be treated with PAXLOVID  Should you decide not to receive it or for your child not to receive it, it will not change your standard medical care  What if I am pregnant or breastfeeding? There is no experience treating pregnant women or breastfeeding mothers with PAXLOVID   For a mother and unborn baby, the benefit of taking PAXLOVID may be greater than the risk from the treatment  If you are pregnant, discuss your options and specific situation with your healthcare provider  It is recommended that you use effective barrier contraception or do not have sexual activity while taking PAXLOVID  If you are breastfeeding, discuss your options and specific situation with your healthcare provider  How do I report side effects with PAXLOVID? Contact your healthcare provider if you have any side effects that bother you or do not go away  Report side effects to FDA MedWatch at www fda gov/medwatch or call 5-624-VZZ5474 or you can report side effects to [employer]  at the contact information provided below  Website Fax number Telephone number   [facility] [phone]     How should I store [place]? Store PAXLOVID tablets at room temperature between 68°F to 77°F (20°C to 25°C)  Full fact sheet for patients, parents, and caregivers can be found at: [employer]    I have spent 12 minutes directly with the patient  Greater than 50% of this time was spent in counseling/coordination of care regarding: diagnostic results, prognosis, risks and benefits of treatment options, instructions for management, patient and family education, importance of treatment compliance, risk factor reductions and impressions  Encounter provider: Jonathan Reynaga MD     Provider located at: [address] [place] [id number]     Recent Visits  No visits were found meeting these conditions    Showing recent visits within past 7 days and meeting all other requirements  Today's Visits  Date Type Provider Dept   11/25/22 Office Visit Jonathan Reynaga MD [address] [place] [address]   11/25/22 Telephone Jonathan Reynaga MD [place] today's visits and meeting all other requirements  Future Appointments  No visits were found meeting these conditions  Showing future appointments within next 150 days and meeting all other requirements     Subjective:   Maricarmen Manriquez is a 68 y o  female who has been screened for COVID-19  Symptom change since last report: unchanged  Patient's symptoms include fever, chills, fatigue, malaise, nasal congestion, rhinorrhea, sore throat, cough and myalgias  Patient denies anosmia, loss of taste, shortness of breath, chest tightness, abdominal pain, nausea, vomiting, diarrhea and headaches  - Date of symptom onset: 11/23/2022  - Date of positive COVID-19 test: 11/25/2022  Type of test: Rapid antigen  COVID-19 vaccination status: Fully vaccinated with booster    Breanna Ly has been staying home and has isolated themselves in her home  Taking care not to share personal items?: is not      Cleaning all surfaces that are touched often?: is not      Wearing a mask when leaving room?: is not      Day #2 of COVID symptoms as above  Patient came to office before visit and had a positive rapid COVID test   Evaluation was done in the parking lot  Lab Results   Component Value Date    SARSCOV2 Not Detected 01/02/2021    [facility] [facility] [address] Not Detected 02/15/2022    SARSCOVAG Positive (A) 11/25/2022       Review of Systems   Constitutional: Positive for chills, fatigue and fever  HENT: Positive for congestion, rhinorrhea and sore throat  Respiratory: Positive for cough  Negative for chest tightness and shortness of breath  Gastrointestinal: Negative for abdominal pain, diarrhea, nausea and vomiting  Musculoskeletal: Positive for myalgias  Neurological: Negative for headaches       Current Outpatient Medications on File Prior to Visit   Medication Sig   • amLODIPine (NORVASC) 5 mg tablet Take 1 tablet (5 mg total) by mouth daily   • ascorbic acid (VITAMIN C) 500 mg tablet Take by mouth daily   • atenolol (TENORMIN) 25 mg tablet Take 1 tablet (25 mg total) by mouth daily   • betamethasone, augmented, (DIPROLENE) 0 05 % ointment APPLYTWICE DAILY TO AFFECTED AREA ON LEG FOR 2 WEEKS THEN REDUCE      (REFER TO PRESCRIPTION NOTES)  • Blood Glucose Monitoring Suppl (Contour Next Monitor) w/Device KIT Use bid as directed   • chlordiazePOXIDE (LIBRIUM) 5 mg capsule Take 1 capsule (5 mg total) by mouth 2 (two) times a day   • Droplet Pen Needles 32G X 4 MM MISC USE ONCE DAILY FOR INSULIN INJECTION   • ezetimibe (ZETIA) 10 mg tablet Take 10 mg by mouth daily   • fluticasone (FLONASE) 50 mcg/act nasal spray 2 sprays into each nostril daily 2 sprays bilat qd   • glucose blood (Contour Next Test) test strip Patient tests blood sugars twice daily   • hydrocortisone 2 5 % cream MIX PEA SIZED DOSE WITH KETOCONAZOLE CREAM AND APPLY UNDER BREAST     (REFER TO PRESCRIPTION NOTES)     • imipramine (TOFRANIL) 25 mg tablet Take 1 tablet (25 mg total) by mouth 2 (two) times a day   • insulin glargine (LANTUS) 100 units/mL subcutaneous injection Inject 40 Units under the skin daily   • ketoconazole (NIZORAL) 2 % cream APPLY TOPICALLY TWICE DAILY UNTIL RASH CLEARS UNDER BREASTS   • losartan (COZAAR) 50 mg tablet Take 50 mg by mouth 2 (two) times a day   • Microlet Lancets MISC Patient tests blood glucose twice daily   • mupirocin (BACTROBAN) 2 % ointment Apply topically Three times a day   • pantoprazole (PROTONIX) 40 mg tablet take 1 tablet by mouth twice a day 30 TO 60 MINUTES BEFORE BREAKFAST AND DINNER   • pramipexole (MIRAPEX) 0 75 MG tablet TAKE 1 TABLET BY MOUTH 1 TO 2 HOURS BEFORE BEDTIME   • RA Aspirin EC 81 MG EC tablet take 1 tablet by mouth once daily   • repaglinide (PRANDIN) 0 5 mg tablet take 1 tablet by mouth three times a day BEFORE MEALS   • triamcinolone (KENALOG) 0 1 % cream Apply topically 2 (two) times a day   • Vascepa 1 g CAPS Take 2 capsules by mouth 2 (two) times a day   • vitamin E 100 UNIT capsule Take 800 Units by mouth   • Vitamin E 400 units TABS Take 2 tablets by mouth daily   • [DISCONTINUED] Promethazine-DM (PHENERGAN-DM) 6 25-15 mg/5 mL oral syrup Take 5 mL by mouth 4 (four) times a day as needed for cough (Patient not taking: Reported on 10/6/2022)       Objective:    Pulse 75   Temp 97 6 °F (36 4 °C) (Temporal)   SpO2 96%      Physical Exam  Vitals reviewed  Constitutional:       Appearance: Normal appearance  HENT:      Head: Normocephalic  Nose: Congestion present  Mouth/Throat:      Mouth: Mucous membranes are moist    Eyes:      Extraocular Movements: Extraocular movements intact  Conjunctiva/sclera: Conjunctivae normal       Pupils: Pupils are equal, round, and reactive to light  Cardiovascular:      Rate and Rhythm: Normal rate and regular rhythm  Pulmonary:      Effort: Pulmonary effort is normal       Breath sounds: No wheezing or rales  Musculoskeletal:      Cervical back: No tenderness  Lymphadenopathy:      Cervical: No cervical adenopathy  Skin:     General: Skin is warm  Capillary Refill: Capillary refill takes less than 2 seconds  Neurological:      General: No focal deficit present  Mental Status: She is alert and oriented to person, place, and time         Brendon Ramos MD

## 2022-11-25 NOTE — TELEPHONE ENCOUNTER
Patient is c/o diarrhea, nasal congestion, cough and chills  She states symptoms began 2 days ago     She denies sore throat, fever, ear pain     She did not test for COVID     She is asking for an appointment or for an antibiotic and cough medicine to be sent to the pharmacy

## 2022-11-26 NOTE — TELEPHONE ENCOUNTER
Regarding: COVID medication questions  ----- Message from Ghazala Lerma sent at 11/25/2022  8:14 PM EST -----  "he put me on this pill u take 3am and 3 at night (Paxlovid) because i have the virus, i dont know if he told me i have to stop my cholesterol or blood pressure medication the drug store told me they think its cholesterol "

## 2022-11-26 NOTE — TELEPHONE ENCOUNTER
Reason for Disposition  • [1] Caller has URGENT medicine question about med that PCP or specialist prescribed AND [2] triager unable to answer question    Answer Assessment - Initial Assessment Questions  1  NAME of MEDICATION: "What medicine are you calling about?"      Paxlovid     2  QUESTION: "What is your question?" (e g , medication refill, side effect)     "I thought Dr Rodney Henderson told me to hold the blood pressure medications and the cholesterol medication, which one do I hold?"    3  PRESCRIBING HCP: "Who prescribed it?" Reason: if prescribed by specialist, call should be referred to that group        Dr Cindy Bob    Protocols used: MEDICATION QUESTION CALL-ADULT-

## 2022-11-28 DIAGNOSIS — F41.9 ANXIETY: ICD-10-CM

## 2022-11-29 RX ORDER — CHLORDIAZEPOXIDE HYDROCHLORIDE 5 MG/1
5 CAPSULE, GELATIN COATED ORAL 2 TIMES DAILY
Qty: 60 CAPSULE | Refills: 0 | Status: SHIPPED | OUTPATIENT
Start: 2022-11-29

## 2022-12-01 ENCOUNTER — RA CDI HCC (OUTPATIENT)
Dept: OTHER | Facility: HOSPITAL | Age: 77
End: 2022-12-01

## 2022-12-01 ENCOUNTER — TELEPHONE (OUTPATIENT)
Dept: FAMILY MEDICINE CLINIC | Facility: CLINIC | Age: 77
End: 2022-12-01

## 2022-12-01 NOTE — PROGRESS NOTES
E11 65  [id number]  [facility] 75  coding opportunities          Chart Reviewed number of suggestions sent to Provider: 2     Patients Insurance     Medicare Insurance: [employer]

## 2022-12-01 NOTE — TELEPHONE ENCOUNTER
Pt called stating she would like to be scheduled with someone from [facility] to get her prolia injections  States it's covered 100% with FAP

## 2022-12-03 DIAGNOSIS — G25.81 RESTLESS LEG: ICD-10-CM

## 2022-12-05 RX ORDER — PRAMIPEXOLE DIHYDROCHLORIDE 0.75 MG/1
TABLET ORAL
Qty: 30 TABLET | Refills: 1 | Status: SHIPPED | OUTPATIENT
Start: 2022-12-05

## 2022-12-06 ENCOUNTER — TELEPHONE (OUTPATIENT)
Dept: FAMILY MEDICINE CLINIC | Facility: CLINIC | Age: 77
End: 2022-12-06

## 2022-12-06 ENCOUNTER — OFFICE VISIT (OUTPATIENT)
Dept: FAMILY MEDICINE CLINIC | Facility: CLINIC | Age: 77
End: 2022-12-06

## 2022-12-06 VITALS
WEIGHT: 144.6 LBS | OXYGEN SATURATION: 98 % | BODY MASS INDEX: 28.39 KG/M2 | SYSTOLIC BLOOD PRESSURE: 138 MMHG | HEART RATE: 78 BPM | DIASTOLIC BLOOD PRESSURE: 86 MMHG | RESPIRATION RATE: 17 BRPM | TEMPERATURE: 97.3 F | HEIGHT: 60 IN

## 2022-12-06 DIAGNOSIS — E11.9 TYPE 2 DIABETES MELLITUS WITHOUT COMPLICATION, WITHOUT LONG-TERM CURRENT USE OF INSULIN (HCC): ICD-10-CM

## 2022-12-06 DIAGNOSIS — M81.0 AGE-RELATED OSTEOPOROSIS WITHOUT CURRENT PATHOLOGICAL FRACTURE: Primary | ICD-10-CM

## 2022-12-06 DIAGNOSIS — H25.9 AGE-RELATED CATARACT OF BOTH EYES, UNSPECIFIED AGE-RELATED CATARACT TYPE: ICD-10-CM

## 2022-12-06 DIAGNOSIS — Z01.810 PREOP CARDIOVASCULAR EXAM: Primary | ICD-10-CM

## 2022-12-06 DIAGNOSIS — I10 PRIMARY HYPERTENSION: ICD-10-CM

## 2022-12-06 PROBLEM — D68.9 COAGULOPATHY (HCC): Status: ACTIVE | Noted: 2022-12-06

## 2022-12-06 RX ORDER — INSULIN GLARGINE 100 [IU]/ML
40 INJECTION, SOLUTION SUBCUTANEOUS DAILY
Qty: 15 ML | Refills: 5 | Status: SHIPPED | OUTPATIENT
Start: 2022-12-06 | End: 2023-07-14 | Stop reason: SDUPTHER

## 2022-12-06 RX ORDER — GABAPENTIN 100 MG/1
100 CAPSULE ORAL 2 TIMES DAILY
COMMUNITY
Start: 2022-11-16

## 2022-12-06 RX ORDER — OFLOXACIN 3 MG/ML
1 SOLUTION/ DROPS OPHTHALMIC DAILY
COMMUNITY
Start: 2022-12-05

## 2022-12-06 RX ORDER — LORAZEPAM 0.5 MG/1
0.5 TABLET ORAL EVERY 6 HOURS PRN
COMMUNITY
Start: 2022-10-11

## 2022-12-06 RX ORDER — INSULIN GLARGINE 100 [IU]/ML
40 INJECTION, SOLUTION SUBCUTANEOUS DAILY
Qty: 15 ML | Refills: 3 | Status: SHIPPED | OUTPATIENT
Start: 2022-12-06 | End: 2022-12-06 | Stop reason: ALTCHOICE

## 2022-12-06 RX ORDER — GABAPENTIN 100 MG/1
100 CAPSULE ORAL 2 TIMES DAILY
COMMUNITY
Start: 2022-11-08 | End: 2022-12-08

## 2022-12-06 NOTE — TELEPHONE ENCOUNTER
Patient informed  Patient wanted to let me know that her appointment with Stefano Aragon is April 26, 2023

## 2022-12-06 NOTE — PROGRESS NOTES
Patient ID: Keshia Hilario is a 68 y o  female  HPI: 68 y  o female is being seen for a preoperative visit bilat cataract surgery (Dr Santi Mercado) R on 12/12 and L on 1/4/23    Surgical Risk Assessment:    Prior anesthesia: Adverse Reaction to : Epidural n    General n   Spinal n  Family history of adverse reactions to anesthesia? Pertinent Past Medical History:  CAD, DMII, Carotid stenosis, hx of CVA, hyperlipidemia    Exercise Capacity:     Able to walk 4 blocks w/o Sx       y       Able to walk 2 flights of steps w/o Sx   y    Lifestyle Factors: Tobacco Use:  Quit several years ago        Pack years: 50+ pk yr  Alcohol Use:  no  Illicit Drug Use:  no  No transfusions : Taoism   no     AAD Risk Factors: none      Personal history of venous thromboembolic disease:    History of Steroid use for >2 weeks within last year? Family History   Problem Relation Age of Onset   • Diabetes Sister    • Dementia Sister      Social History     Socioeconomic History   • Marital status:      Spouse name: Not on file   • Number of children: Not on file   • Years of education: Not on file   • Highest education level: Not on file   Occupational History   • Not on file   Tobacco Use   • Smoking status: Former   • Smokeless tobacco: Never   Substance and Sexual Activity   • Alcohol use: Not Currently   • Drug use: Never   • Sexual activity: Not on file   Other Topics Concern   • Not on file   Social History Narrative   • Not on file     Social Determinants of Health     Financial Resource Strain: Low Risk    • Difficulty of Paying Living Expenses: Not very hard   Food Insecurity: Not on file   Transportation Needs: No Transportation Needs   • Lack of Transportation (Medical): No   • Lack of Transportation (Non-Medical):  No   Physical Activity: Not on file   Stress: Not on file   Social Connections: Not on file   Intimate Partner Violence: Not on file   Housing Stability: Not on file     Past Medical History: Diagnosis Date   • Gastroenteritis      Past Surgical History:   Procedure Laterality Date   • ATRIAL CARDIAC PACEMAKER INSERTION       Allergies   Allergen Reactions   • Atorvastatin      Reaction Date: 23Dec2011; Unknown reaction  LFT increased   • Clarithromycin      Reaction Date: 23Dec2011;   Patient does not recall reaction   • Hydrogen Peroxide Swelling     Reaction Date: 23Dec2011;   Swelling in the mouth   • Levofloxacin      Patient does not recall her reaction   • Other Swelling     Perioxol (mouth rinse)  Perioxol (mouth rinse)   • Penicillins Hives and Swelling   • Rosuvastatin      Reaction Date: 23Dec2011; Unknown reaction per patient  Increase LFT       Current Outpatient Medications:   •  ascorbic acid (VITAMIN C) 500 mg tablet, Take by mouth daily, Disp: , Rfl:   •  atenolol (TENORMIN) 25 mg tablet, Take 1 tablet (25 mg total) by mouth daily, Disp: 90 tablet, Rfl: 0  •  betamethasone, augmented, (DIPROLENE) 0 05 % ointment, APPLYTWICE DAILY TO AFFECTED AREA ON LEG FOR 2 WEEKS THEN REDUCE      (REFER TO PRESCRIPTION NOTES)  , Disp: , Rfl:   •  Blood Glucose Monitoring Suppl (Contour Next Monitor) w/Device KIT, Use bid as directed, Disp: 1 kit, Rfl: 1  •  chlordiazePOXIDE (LIBRIUM) 5 mg capsule, Take 1 capsule (5 mg total) by mouth 2 (two) times a day, Disp: 60 capsule, Rfl: 0  •  Droplet Pen Needles 32G X 4 MM MISC, USE ONCE DAILY FOR INSULIN INJECTION, Disp: , Rfl:   •  ezetimibe (ZETIA) 10 mg tablet, Take 10 mg by mouth daily, Disp: , Rfl:   •  fluticasone (FLONASE) 50 mcg/act nasal spray, 2 sprays into each nostril daily 2 sprays bilat qd, Disp: 1 Bottle, Rfl: 2  •  gabapentin (NEURONTIN) 100 mg capsule, Take 100 mg by mouth 2 (two) times a day, Disp: , Rfl:   •  gabapentin (NEURONTIN) 100 mg capsule, Take 100 mg by mouth 2 (two) times a day, Disp: , Rfl:   •  glucose blood (Contour Next Test) test strip, Patient tests blood sugars twice daily, Disp: 100 each, Rfl: 5  •  hydrocortisone 2 5 % cream, MIX PEA SIZED DOSE WITH KETOCONAZOLE CREAM AND APPLY UNDER BREAST     (REFER TO PRESCRIPTION NOTES)  , Disp: , Rfl:   •  imipramine (TOFRANIL) 25 mg tablet, Take 1 tablet (25 mg total) by mouth 2 (two) times a day, Disp: 180 tablet, Rfl: 3  •  insulin glargine (LANTUS) 100 units/mL subcutaneous injection, Inject 40 Units under the skin daily, Disp: 12 mL, Rfl: 3  •  ketoconazole (NIZORAL) 2 % cream, APPLY TOPICALLY TWICE DAILY UNTIL RASH CLEARS UNDER BREASTS, Disp: , Rfl:   •  LORazepam (ATIVAN) 0 5 mg tablet, Take 0 5 mg by mouth every 6 (six) hours as needed, Disp: , Rfl:   •  losartan (COZAAR) 50 mg tablet, Take 50 mg by mouth 2 (two) times a day, Disp: , Rfl:   •  Microlet Lancets MISC, Patient tests blood glucose twice daily, Disp: 100 each, Rfl: 5  •  mupirocin (BACTROBAN) 2 % ointment, Apply topically Three times a day, Disp: , Rfl:   •  ofloxacin (OCUFLOX) 0 3 % ophthalmic solution, Administer 1 drop to both eyes daily, Disp: , Rfl:   •  pantoprazole (PROTONIX) 40 mg tablet, take 1 tablet by mouth twice a day 30 TO 60 MINUTES BEFORE BREAKFAST AND DINNER, Disp: , Rfl:   •  pramipexole (MIRAPEX) 0 75 MG tablet, TAKE 1 TABLET BY MOUTH 1 TO 2 HOURS BEFORE BEDTIME, Disp: 30 tablet, Rfl: 1  •  Promethazine-DM (PHENERGAN-DM) 6 25-15 mg/5 mL oral syrup, Take 5 mL by mouth 4 (four) times a day as needed for cough, Disp: 150 mL, Rfl: 0  •  RA Aspirin EC 81 MG EC tablet, take 1 tablet by mouth once daily, Disp: 90 tablet, Rfl: 0  •  repaglinide (PRANDIN) 0 5 mg tablet, Take 1 tablet (0 5 mg total) by mouth 3 (three) times a day before meals, Disp: 90 tablet, Rfl: 1  •  triamcinolone (KENALOG) 0 1 % cream, Apply topically 2 (two) times a day, Disp: 30 g, Rfl: 0  •  Vascepa 1 g CAPS, Take 2 capsules by mouth 2 (two) times a day, Disp: , Rfl:   •  vitamin E 100 UNIT capsule, Take 800 Units by mouth, Disp: , Rfl:   •  Vitamin E 400 units TABS, Take 2 tablets by mouth daily, Disp: , Rfl:      Review of Systems    Consitutional:  Denies, chills, fatigue and fever   ENT:  Positive for and blurry vision Denies, eye pain, photophobia, ear pain/pressure, epistaxis, nasal discharge and nasal congestion  Pulmonary:  Denies cough, shortness of breath or dyspnea on exertion and No cough, shortness of breath, dyspnea on exertion    Cardiovascular:  Denies chest pain/pressure   Abdomen:  Denies abdominal pain, nausea, vomiting, diarrhea, constipation  Genitourinary:  no urinary symptoms   Hematology/Lymphatics:   Denies, bruising, jaundice, night sweats   Musculoskeletal:  Denies gait disturbance, myalgia, arthalgia or muscle weakness and No gait disturbance, myalgia,  arthalgia or muscle weakness    Integumentary:  Denies ecchymosis, petechiae, rash or lesions   Neurological:  Denies headaches, dizziness, confusion, loss of consciousness or behavioral changes  Psychological:  Positive for anxiety      OBJECTIVE    /92 (BP Location: Right arm, Patient Position: Sitting, Cuff Size: Standard)   Pulse 78   Temp (!) 97 3 °F (36 3 °C) (Temporal)   Resp 17   Ht 5' (1 524 m)   Wt 65 6 kg (144 lb 9 6 oz)   SpO2 98%   BMI 28 24 kg/m²     Constitutional:  Well appearing and in no acute distress  ENT:  ENT exam normal, no neck nodes or sinus tenderness    Bilat cataract  Pulmonary:  clear to auscultation bilaterally  Cardiovascular:  S1S2, regular rate and rhythm  Gastrointestinal:  abdomen is soft without significant tenderness  no masses  no organmegaly  Lymphatic:  no lymphadenopathy   Musculoskeletal:  osteoarthritic changes noted in both hands  Skin:  warm, no rashes, no ecchymosis  Neurologic:  Alert and oriented x 4      DATA:  Laboratory Results:     Lab Results   Component Value Date    ALT 36 03/22/2019    AST 20 03/22/2019    BUN 18 03/22/2019    CALCIUM 9 0 03/22/2019     03/22/2019    CHOL 257 (H) 09/22/2016    CO2 28 03/22/2019    CREATININE 0 81 03/22/2019    HDL 29 (L) 03/22/2019    HCT 45 7 03/22/2019 HGB 14 4 03/22/2019    HGBA1C 9 4 (H) 08/02/2022     03/22/2019    K 4 2 03/22/2019     09/22/2016    TRIG 491 (H) 03/22/2019    WBC 9 97 03/22/2019       Patient Clearance:Risk Estimation: per the Revised Cardiac Risk Index (Circ  100:1043, 1999): the patient's risk factors for cardiac complications include:   RCI Risk Class: III    Current medications which may produce withdrawal symptoms if withheld perioperatively:    Pre-op Evaluation Plan  1  Further preoperative work-up as follows:  2  Medication Management/Recommendations:  3  Prophylaxis for cardiac events with perioperative beta-blockers:     Clearance:  Patient is CLEARED for surgery without any additional cardiac testing      Assessment/Plan:  Diagnoses and all orders for this visit:    Preop cardiovascular exam    Age-related cataract of both eyes, unspecified age-related cataract type    Type 2 diabetes mellitus without complication, without long-term current use of insulin (HCC)  -     insulin glargine (LANTUS) 100 units/mL subcutaneous injection; Inject 40 Units under the skin daily    Primary hypertension    Other orders  -     gabapentin (NEURONTIN) 100 mg capsule; Take 100 mg by mouth 2 (two) times a day  -     gabapentin (NEURONTIN) 100 mg capsule; Take 100 mg by mouth 2 (two) times a day  -     LORazepam (ATIVAN) 0 5 mg tablet; Take 0 5 mg by mouth every 6 (six) hours as needed  -     ofloxacin (OCUFLOX) 0 3 % ophthalmic solution; Administer 1 drop to both eyes daily      Pt is medically cleared for cataract surgery   F/u 3m

## 2022-12-06 NOTE — TELEPHONE ENCOUNTER
Pharmacy states that patient usually gets her lantus in pens, but the script they received today was for lantus vials     They were calling to confirm this was correct or if it should be the pens

## 2022-12-08 LAB
LEFT EYE DIABETIC RETINOPATHY: NORMAL
RIGHT EYE DIABETIC RETINOPATHY: NORMAL

## 2022-12-17 NOTE — TELEPHONE ENCOUNTER
Patient is alert and oriented x3. Up ad demarco in hallway. Up to chair for meals. Voiding freely. Tolerating diet; denies nausea or vomiting. + BM + gas Pain controlled. Patient assessed and ready for DC home. All instructions given to patient for home. All questions answered to patients level of satisfaction. PIV removed and intact. done

## 2022-12-23 DIAGNOSIS — E11.9 TYPE 2 DIABETES MELLITUS WITHOUT COMPLICATION, WITHOUT LONG-TERM CURRENT USE OF INSULIN (HCC): ICD-10-CM

## 2022-12-23 DIAGNOSIS — F41.9 ANXIETY: ICD-10-CM

## 2022-12-23 RX ORDER — CHLORDIAZEPOXIDE HYDROCHLORIDE 5 MG/1
5 CAPSULE, GELATIN COATED ORAL 2 TIMES DAILY
Qty: 60 CAPSULE | Refills: 0 | Status: SHIPPED | OUTPATIENT
Start: 2022-12-23

## 2022-12-23 RX ORDER — REPAGLINIDE 0.5 MG/1
0.5 TABLET ORAL
Qty: 90 TABLET | Refills: 1 | Status: SHIPPED | OUTPATIENT
Start: 2022-12-23

## 2022-12-23 NOTE — TELEPHONE ENCOUNTER
1 [id number] 11/29/2022 11/29/2022 chlordiazePOXIDE HCL (Capsule) 60 0 30 5 MG NA DANNY CHOWDARY [facility]  Medicare 0 / 0 [place]    1 [id number] 10/28/2022 10/28/2022 chlordiazePOXIDE HCL (Capsule) 60 0 30 5 MG NA DANNY CHOWDARY [facility]   Medicare 0 / 0 [place]

## 2022-12-27 ENCOUNTER — TELEPHONE (OUTPATIENT)
Dept: FAMILY MEDICINE CLINIC | Facility: CLINIC | Age: 77
End: 2022-12-27

## 2022-12-27 ENCOUNTER — NURSE TRIAGE (OUTPATIENT)
Dept: OTHER | Facility: OTHER | Age: 77
End: 2022-12-27

## 2022-12-27 NOTE — TELEPHONE ENCOUNTER
Patient called and advised that she had a recent visit and wanted to mention that she has not been sleeping for months  Patient wakes up after 2am and is up for the rest of the morning  Patient advised that she is exhausted and would like something to help her sleep      [facility]    # [phone]

## 2022-12-28 DIAGNOSIS — F51.01 PRIMARY INSOMNIA: Primary | ICD-10-CM

## 2022-12-28 RX ORDER — HYDROXYZINE HYDROCHLORIDE 25 MG/1
25 TABLET, FILM COATED ORAL
Qty: 30 TABLET | Refills: 1 | Status: SHIPPED | OUTPATIENT
Start: 2022-12-28 | End: 2023-01-16 | Stop reason: ALTCHOICE

## 2022-12-28 NOTE — TELEPHONE ENCOUNTER
Spoke with pt, pt states she is having overall issues with sleeping  She wakes up @2:30am - 3:00am and is unable to fall back asleep  Pt also states Mirapex has helped with the restless leg  Pt believes she may have the stomach bug, she's had diarrhea for a few days now

## 2022-12-28 NOTE — TELEPHONE ENCOUNTER
Reason for Disposition  • Over-the-counter sleeping pills, questions about    Answer Assessment - Initial Assessment Questions  1  DESCRIPTION: "Tell me about your sleeping problem "       Waking up at 2 or 3 am and then staying awake all night   2  ONSET: "How long have you been having trouble sleeping?" (e g , days, weeks, months)      2 months   3  RECURRENT: "Have you had sleeping problems before?"  If Yes, ask: "What happened that time?" "What helped your sleeping problem go away in the past?"       Yes   4  STRESS: "Is there anything in your life that is making you feel stressed or tense?"      Unsure   5  PAIN: "Do you have any pain that is keeping you awake?" (e g , back pain, headache, abdominal pain)      No   6  CAFFEINE ABUSE: "Do you drink caffeinated beverages, and how much each day?" (e g , coffee, tea, shanta)      No   7  ALCOHOL USE OR SUBSTANCE USE (DRUG USE): "Do you drink alcohol or use any illegal drugs?"      No   8   OTHER SYMPTOMS: "Do you have any other symptoms?"  (e g , difficulty breathing)      "stomach virus"    Protocols used: INSOMNIA-ADULT-

## 2022-12-28 NOTE — TELEPHONE ENCOUNTER
Patient is requesting a sleeping pill, she states that she is very rammy and restless she is on a pill for restless leg syndrome  Can someone please call patient in AM  She states she has called about this issue and no one has responded to her

## 2022-12-28 NOTE — TELEPHONE ENCOUNTER
Regarding: Restlessness/trouble sleeping  ----- Message from ALTHIA Cage sent at 12/27/2022  5:50 PM EST -----  "I'm haven't been sleeping and I can't keep going on like this  I was up every hour last night   I'm restless and rammy all night long "

## 2022-12-31 DIAGNOSIS — F41.9 ANXIETY: ICD-10-CM

## 2023-01-03 DIAGNOSIS — F41.9 ANXIETY: ICD-10-CM

## 2023-01-03 RX ORDER — IMIPRAMINE HCL 25 MG
TABLET ORAL
Qty: 180 TABLET | Refills: 3 | Status: SHIPPED | OUTPATIENT
Start: 2023-01-03

## 2023-01-04 RX ORDER — IMIPRAMINE HCL 25 MG
25 TABLET ORAL 2 TIMES DAILY
Qty: 180 TABLET | Refills: 3 | Status: SHIPPED | OUTPATIENT
Start: 2023-01-04

## 2023-01-05 DIAGNOSIS — G25.81 RESTLESS LEG: ICD-10-CM

## 2023-01-05 RX ORDER — PRAMIPEXOLE DIHYDROCHLORIDE 0.75 MG/1
0.75 TABLET ORAL EVERY EVENING
Qty: 30 TABLET | Refills: 5 | Status: SHIPPED | OUTPATIENT
Start: 2023-01-05

## 2023-01-11 DIAGNOSIS — Z79.4 TYPE 2 DIABETES MELLITUS WITHOUT COMPLICATION, WITH LONG-TERM CURRENT USE OF INSULIN (HCC): ICD-10-CM

## 2023-01-11 DIAGNOSIS — E11.9 TYPE 2 DIABETES MELLITUS WITHOUT COMPLICATION, WITH LONG-TERM CURRENT USE OF INSULIN (HCC): ICD-10-CM

## 2023-01-11 DIAGNOSIS — E11.9 TYPE 2 DIABETES MELLITUS WITHOUT COMPLICATION, WITHOUT LONG-TERM CURRENT USE OF INSULIN (HCC): ICD-10-CM

## 2023-01-11 RX ORDER — PERPHENAZINE 16 MG/1
TABLET, FILM COATED ORAL
Qty: 100 EACH | Refills: 5 | Status: SHIPPED | OUTPATIENT
Start: 2023-01-11

## 2023-01-11 RX ORDER — LANCETS
EACH MISCELLANEOUS
Qty: 100 EACH | Refills: 5 | Status: SHIPPED | OUTPATIENT
Start: 2023-01-11

## 2023-01-11 NOTE — TELEPHONE ENCOUNTER
Pt was recently in for an appointment  Patient had Covid over a month ago  Offered her an  Apt  She did not want to come in  Patient has been coughing up large amounts of phlegm and wheezing  Patient has a runny nose  Patient has not been taking anything for these  Patient denies SOB, fever  She would like to know what she can do ?     Pharmacy : [facility]    PT: [phone]

## 2023-01-16 ENCOUNTER — DOCUMENTATION (OUTPATIENT)
Dept: FAMILY MEDICINE CLINIC | Facility: CLINIC | Age: 78
End: 2023-01-16

## 2023-01-16 ENCOUNTER — TELEPHONE (OUTPATIENT)
Dept: FAMILY MEDICINE CLINIC | Facility: CLINIC | Age: 78
End: 2023-01-16

## 2023-01-16 ENCOUNTER — OFFICE VISIT (OUTPATIENT)
Dept: FAMILY MEDICINE CLINIC | Facility: CLINIC | Age: 78
End: 2023-01-16

## 2023-01-16 ENCOUNTER — NURSE TRIAGE (OUTPATIENT)
Dept: OTHER | Facility: OTHER | Age: 78
End: 2023-01-16

## 2023-01-16 VITALS
DIASTOLIC BLOOD PRESSURE: 78 MMHG | WEIGHT: 148.7 LBS | HEIGHT: 60 IN | HEART RATE: 81 BPM | OXYGEN SATURATION: 95 % | BODY MASS INDEX: 29.19 KG/M2 | SYSTOLIC BLOOD PRESSURE: 130 MMHG | TEMPERATURE: 98 F

## 2023-01-16 DIAGNOSIS — R30.0 DYSURIA: ICD-10-CM

## 2023-01-16 DIAGNOSIS — F41.9 ANXIETY: ICD-10-CM

## 2023-01-16 DIAGNOSIS — F51.01 PRIMARY INSOMNIA: ICD-10-CM

## 2023-01-16 DIAGNOSIS — R06.2 WHEEZE: Primary | ICD-10-CM

## 2023-01-16 LAB
SL AMB  POCT GLUCOSE, UA: NEGATIVE
SL AMB LEUKOCYTE ESTERASE,UA: ABNORMAL
SL AMB POCT BILIRUBIN,UA: ABNORMAL
SL AMB POCT BLOOD,UA: NEGATIVE
SL AMB POCT CLARITY,UA: CLEAR
SL AMB POCT COLOR,UA: YELLOW
SL AMB POCT KETONES,UA: NEGATIVE
SL AMB POCT NITRITE,UA: POSITIVE
SL AMB POCT PH,UA: 5
SL AMB POCT SPECIFIC GRAVITY,UA: 1.02
SL AMB POCT URINE PROTEIN: ABNORMAL
SL AMB POCT UROBILINOGEN: NORMAL

## 2023-01-16 RX ORDER — ASPIRIN 81 MG/1
81 TABLET ORAL DAILY
COMMUNITY

## 2023-01-16 RX ORDER — TRAZODONE HYDROCHLORIDE 50 MG/1
50 TABLET ORAL
Qty: 30 TABLET | Refills: 5 | Status: SHIPPED | OUTPATIENT
Start: 2023-01-16 | End: 2023-01-16 | Stop reason: ALTCHOICE

## 2023-01-16 RX ORDER — IMIPRAMINE HCL 25 MG
TABLET ORAL
Qty: 270 TABLET | Refills: 3 | Status: SHIPPED | OUTPATIENT
Start: 2023-01-16

## 2023-01-16 RX ORDER — BROMFENAC SODIUM 0.7 MG/ML
SOLUTION/ DROPS OPHTHALMIC
COMMUNITY
Start: 2022-12-30 | End: 2023-01-25 | Stop reason: ALTCHOICE

## 2023-01-16 NOTE — TELEPHONE ENCOUNTER
Patient called stating this morning she started wheezing  If she is walking "fast" she starts wheezing  Another time the wheezing was present, was after she got out of the shower, she was putting lotion on, and the wheezing started  She went to sit on her sofa and was still wheezing  After she "coughs up a hunk of phlegm" the wheezing goes away  She denies any chest pain or SOB  She states that she has been very restless as well  She wakes up after about 6 hours around 3-4 AM for the past few days  She is asking if she should come in for an appointment

## 2023-01-16 NOTE — PROGRESS NOTES
Name: Giovana Dodge      : 1945      MRN: [id number]  Encounter Provider: David Virgen MD  Encounter Date: 2023   Encounter department: [address] [place] [address] Road     1  Wheeze  Assessment & Plan:  Rales heard in the chest but no wheeze  Check chest x-ray  Further recommendations based on these results    Orders:  -     XR chest pa & lateral; Future; Expected date: 2023  -     XR chest pa & lateral    2  Primary insomnia  Assessment & Plan:  ?  Related to anxiety? Increase imipramine to 50 mg nightly  Patient will call in 1 to 2 weeks with follow-up      3  Dysuria  Assessment & Plan:  I reviewed with patient  UA did show white blood cells with nitrites  Start Bactrim DS po twice daily  Recheck 3 to 4 days if not improved-earlier if worse    Orders:  -     POCT urine dip  -     Urine culture; Future  -     Urine culture  -     sulfamethoxazole-trimethoprim (BACTRIM DS) 800-160 mg per tablet; Take 1 tablet by mouth every 12 (twelve) hours for 5 days    4  Anxiety  Assessment & Plan:  As cause of worsening sleep? I reviewed with patient  Patient has been on imipramine for multiple years and is doing well  We will try increasing medication to 50 mg a day  I reviewed side effects with patient  Follow-up 2 weeks if not improved    Orders:  -     imipramine (TOFRANIL) 25 mg tablet; 1 tab in the morning and 2 tab qHS           Subjective     Pt here for several concerns  - 80-year-old female with a history of smoking (quit approximately 7 years ago) presents with a 2+ week history of intermittent wheezing  Patient states that the wheezing seems to be external   When she is sitting and resting her breathing is fine however if she leans forward, she can hear herself wheeze  Wheezing does resolve if she expectorates phlegm  Patient denies any fever or chills  - pt also with recent hx of mild dysuria   Pt denies fever/chills, N/V, abd pain, flank pain, or hematuria  - pt also continues to have difficulty with sleep  Pt can fall asleep but has issues staying asleep  Failed hydroxyzine  Still with significant anxiety  Denies worsening depression    Review of Systems   Constitutional: Positive for fatigue  Negative for activity change and appetite change  HENT: Negative  Eyes: Negative  Respiratory: Positive for cough and wheezing  Negative for shortness of breath  Cardiovascular: Negative  Gastrointestinal: Negative  Genitourinary: Positive for dysuria and frequency  Negative for difficulty urinating and hematuria  Psychiatric/Behavioral: Positive for sleep disturbance  Negative for agitation, dysphoric mood, self-injury and suicidal ideas  The patient is nervous/anxious  Past Medical History:   Diagnosis Date   • Gastroenteritis      Past Surgical History:   Procedure Laterality Date   • ATRIAL CARDIAC PACEMAKER INSERTION       Family History   Problem Relation Age of Onset   • Diabetes Sister    • Dementia Sister      Social History     Socioeconomic History   • Marital status:      Spouse name: None   • Number of children: None   • Years of education: None   • Highest education level: None   Occupational History   • None   Tobacco Use   • Smoking status: Former   • Smokeless tobacco: Never   Substance and Sexual Activity   • Alcohol use: Not Currently   • Drug use: Never   • Sexual activity: None   Other Topics Concern   • None   Social History Narrative   • None     Social Determinants of Health     Financial Resource Strain: Low Risk    • Difficulty of Paying Living Expenses: Not very hard   Food Insecurity: Not on file   Transportation Needs: No Transportation Needs   • Lack of Transportation (Medical): No   • Lack of Transportation (Non-Medical):  No   Physical Activity: Not on file   Stress: Not on file   Social Connections: Not on file   Intimate Partner Violence: Not on file   Housing Stability: Not on file Current Outpatient Medications on File Prior to Visit   Medication Sig   • ascorbic acid (VITAMIN C) 500 mg tablet Take by mouth daily   • aspirin (ECOTRIN LOW STRENGTH) 81 mg EC tablet Take 81 mg by mouth daily   • atenolol (TENORMIN) 25 mg tablet Take 1 tablet (25 mg total) by mouth daily   • Blood Glucose Monitoring Suppl (Contour Next Monitor) w/Device KIT Use bid as directed   • chlordiazePOXIDE (LIBRIUM) 5 mg capsule Take 1 capsule (5 mg total) by mouth 2 (two) times a day   • Droplet Pen Needles 32G X 4 MM MISC USE ONCE DAILY FOR INSULIN INJECTION   • ezetimibe (ZETIA) 10 mg tablet Take 10 mg by mouth daily   • gabapentin (NEURONTIN) 100 mg capsule Take 100 mg by mouth 2 (two) times a day   • glucose blood (Contour Next Test) test strip Patient tests blood sugars twice daily   • Insulin Glargine Solostar (Lantus SoloStar) 100 UNIT/ML SOPN Inject 0 4 mL (40 Units total) under the skin in the morning   • ketoconazole (NIZORAL) 2 % cream APPLY TOPICALLY TWICE DAILY UNTIL RASH CLEARS UNDER BREASTS   • losartan (COZAAR) 50 mg tablet Take 50 mg by mouth 2 (two) times a day   • Microlet Lancets MISC Patient tests blood glucose twice daily   • pantoprazole (PROTONIX) 40 mg tablet take 1 tablet by mouth twice a day 30 TO 60 MINUTES BEFORE BREAKFAST AND DINNER   • pramipexole (MIRAPEX) 0 75 MG tablet Take 1 tablet (0 75 mg total) by mouth every evening   • Promethazine-DM (PHENERGAN-DM) 6 25-15 mg/5 mL oral syrup Take 5 mL by mouth 4 (four) times a day as needed for cough   • RA Aspirin EC 81 MG EC tablet take 1 tablet by mouth once daily   • mupirocin (BACTROBAN) 2 % ointment Apply topically Three times a day (Patient not taking: Reported on 1/16/2023)   • ofloxacin (OCUFLOX) 0 3 % ophthalmic solution Administer 1 drop to both eyes daily (Patient not taking: Reported on 1/16/2023)   • Prolensa 0 07 % SOLN instill 1 drop IN SURGICAL EYE DAILY STARTING THE DAY BEFORE SURGERY (Patient not taking: Reported on 1/16/2023)   • repaglinide (PRANDIN) 0 5 mg tablet Take 1 tablet (0 5 mg total) by mouth 3 (three) times a day before meals   • triamcinolone (KENALOG) 0 1 % cream Apply topically 2 (two) times a day   • Vascepa 1 g CAPS Take 2 capsules by mouth 2 (two) times a day   • vitamin E 100 UNIT capsule Take 800 Units by mouth (Patient not taking: Reported on 1/16/2023)   • Vitamin E 400 units TABS Take 2 tablets by mouth daily     Allergies   Allergen Reactions   • Atorvastatin      Reaction Date: 23Dec2011; Unknown reaction  LFT increased   • Clarithromycin      Reaction Date: 23Dec2011;   Patient does not recall reaction   • Hydrogen Peroxide Swelling     Reaction Date: 23Dec2011;   Swelling in the mouth   • Levofloxacin      Patient does not recall her reaction   • Other Swelling     Perioxol (mouth rinse)  Perioxol (mouth rinse)   • Penicillins Hives and Swelling   • Rosuvastatin      Reaction Date: 23Dec2011; Unknown reaction per patient  Increase LFT     Immunization History   Administered Date(s) Administered   • COVID-19 PFIZER VACCINE 0 3 ML IM 02/17/2021, 03/10/2021, 12/09/2021   • Tuberculin Skin Test-PPD Intradermal 04/14/2008       Objective     /78   Pulse 81   Temp 98 °F (36 7 °C)   Ht 5' (1 524 m)   Wt 67 4 kg (148 lb 11 2 oz)   SpO2 95%   BMI 29 04 kg/m²     Physical Exam  Vitals reviewed  HENT:      Head: Normocephalic  Mouth/Throat:      Mouth: Mucous membranes are moist    Eyes:      Extraocular Movements: Extraocular movements intact  Conjunctiva/sclera: Conjunctivae normal       Pupils: Pupils are equal, round, and reactive to light  Cardiovascular:      Rate and Rhythm: Normal rate and regular rhythm  Pulmonary:      Breath sounds: Rales (1/2 up on R  no dullness to percussion) present  No wheezing or rhonchi  Abdominal:      General: There is no distension  Palpations: There is no mass  Tenderness: There is no abdominal tenderness   There is no right CVA tenderness or left CVA tenderness  Musculoskeletal:      Cervical back: Normal range of motion  No tenderness  Right lower leg: No edema  Left lower leg: No edema  Lymphadenopathy:      Cervical: No cervical adenopathy  Skin:     Capillary Refill: Capillary refill takes less than 2 seconds  Neurological:      Mental Status: She is alert  Sensory: No sensory deficit  Motor: No weakness  Psychiatric:         Behavior: Behavior normal          Thought Content:  Thought content normal          Judgment: Judgment normal       Comments: Increased anxiety with poor sleep       Sharon Freeman MD

## 2023-01-16 NOTE — TELEPHONE ENCOUNTER
Regarding: medication not at pharmacy  ----- Message from Parth Camarillo sent at 1/16/2023  6:39 PM EST -----  " I had a urine test done earlier and was told antibiotics were going to be sent into pharmacy and nothing is there "

## 2023-01-17 RX ORDER — SULFAMETHOXAZOLE AND TRIMETHOPRIM 800; 160 MG/1; MG/1
1 TABLET ORAL EVERY 12 HOURS SCHEDULED
Qty: 10 TABLET | Refills: 0 | Status: SHIPPED | OUTPATIENT
Start: 2023-01-17 | End: 2023-01-22

## 2023-01-17 NOTE — TELEPHONE ENCOUNTER
Reason for Disposition  • [1] Caller has URGENT medicine question about med that PCP or specialist prescribed AND [2] triager unable to answer question    Answer Assessment - Initial Assessment Questions  1  NAME of MEDICATION: "What medicine are you calling about?"      Antibiotic was not sent in to pharmacy  2  QUESTION: "What is your question?" (e g , medication refill, side effect)      I have a UTI  3  PRESCRIBING HCP: "Who prescribed it?" Reason: if prescribed by specialist, call should be referred to that group  Dr Laurie Freeman  4  SYMPTOMS: "Do you have any symptoms?"      Yes urinary SXS  5  SEVERITY: If symptoms are present, ask "Are they mild, moderate or severe?"      Moderate  6   PREGNANCY:  "Is there any chance that you are pregnant?" "When was your last menstrual period?"      No    Protocols used: MEDICATION QUESTION CALL-ADULT-

## 2023-01-17 NOTE — TELEPHONE ENCOUNTER
TC sent to Dr Katelynn Ott regarding marin's request   Dr Todd Fried responded back that after reviewing her chart it was not clear what Dr Kalyn Pace wanted to order  Plus her CXR and urine culture results are not back yet  Patient will need to call office in the morning  I informed patient of the above and she understood and said that she will comply

## 2023-01-18 PROBLEM — R06.2 WHEEZE: Status: ACTIVE | Noted: 2023-01-18

## 2023-01-18 PROBLEM — R30.0 DYSURIA: Status: ACTIVE | Noted: 2023-01-18

## 2023-01-18 LAB — BACTERIA UR CULT: ABNORMAL

## 2023-01-18 NOTE — ASSESSMENT & PLAN NOTE
As cause of worsening sleep? I reviewed with patient  Patient has been on imipramine for multiple years and is doing well  We will try increasing medication to 50 mg a day  I reviewed side effects with patient    Follow-up 2 weeks if not improved

## 2023-01-18 NOTE — ASSESSMENT & PLAN NOTE
Rales heard in the chest but no wheeze  Check chest x-ray    Further recommendations based on these results

## 2023-01-18 NOTE — ASSESSMENT & PLAN NOTE
I reviewed with patient  UA did show white blood cells with nitrites  Start Bactrim DS po twice daily    Recheck 3 to 4 days if not improved-earlier if worse

## 2023-01-18 NOTE — ASSESSMENT & PLAN NOTE
?  Related to anxiety? Increase imipramine to 50 mg nightly    Patient will call in 1 to 2 weeks with follow-up

## 2023-01-23 ENCOUNTER — TELEPHONE (OUTPATIENT)
Dept: FAMILY MEDICINE CLINIC | Facility: CLINIC | Age: 78
End: 2023-01-23

## 2023-01-23 DIAGNOSIS — F41.9 ANXIETY: ICD-10-CM

## 2023-01-23 NOTE — TELEPHONE ENCOUNTER
Patient called asking for refill on medication:  chlordiazePOXIDE (LIBRIUM) 5 mg capsule  ([facility])    She states that her wheezing is still there  She mostly wheezes when she is up and moving around, when she is resting, the wheezing goes away  She states that she finished her antibiotic, and her infection is gone  However, she is still having a lot of trouble sleeping  She states that she will wake up around 1:30 AM and is unable to fall back to sleep  There was another time that she woke up around 12:00AM and could not fall asleep until 6 AM      She is asking how she should proceed

## 2023-01-25 DIAGNOSIS — F41.9 ANXIETY: ICD-10-CM

## 2023-01-25 RX ORDER — CHLORDIAZEPOXIDE HYDROCHLORIDE 5 MG/1
5 CAPSULE, GELATIN COATED ORAL 2 TIMES DAILY
Qty: 60 CAPSULE | Refills: 0 | Status: SHIPPED | OUTPATIENT
Start: 2023-01-25 | End: 2023-02-23 | Stop reason: SDUPTHER

## 2023-01-26 DIAGNOSIS — F51.01 PRIMARY INSOMNIA: ICD-10-CM

## 2023-01-26 NOTE — TELEPHONE ENCOUNTER
Medication Refill Request     Name hydroxyzine HCL 25 mg   Dose/Frequency 1 tablet at bed time   Quantity 30 tablet   Verified pharmacy   [x]  Verified ordering Provider   [x]  Does patient have enough for the next 3 days? Yes [] No [x]      Patient stating that she have green mucous       Please advice

## 2023-01-26 NOTE — TELEPHONE ENCOUNTER
Patient called and advised that she is still coughing up green mucous and wheezing  Patient was advised  Patient will try taking her second dose of Librium 1/2 prior to bed   Patient advised also that she still would like her sleeping pill as she finally was able to get a good nights rest

## 2023-01-27 ENCOUNTER — OFFICE VISIT (OUTPATIENT)
Dept: FAMILY MEDICINE CLINIC | Facility: CLINIC | Age: 78
End: 2023-01-27

## 2023-01-27 ENCOUNTER — TELEPHONE (OUTPATIENT)
Dept: FAMILY MEDICINE CLINIC | Facility: CLINIC | Age: 78
End: 2023-01-27

## 2023-01-27 VITALS
SYSTOLIC BLOOD PRESSURE: 150 MMHG | HEART RATE: 89 BPM | HEIGHT: 60 IN | RESPIRATION RATE: 20 BRPM | BODY MASS INDEX: 29.21 KG/M2 | TEMPERATURE: 97.4 F | DIASTOLIC BLOOD PRESSURE: 88 MMHG | WEIGHT: 148.8 LBS

## 2023-01-27 DIAGNOSIS — M54.9 MID BACK PAIN: ICD-10-CM

## 2023-01-27 DIAGNOSIS — R05.8 PRODUCTIVE COUGH: ICD-10-CM

## 2023-01-27 DIAGNOSIS — R39.9 URINARY SYMPTOM OR SIGN: ICD-10-CM

## 2023-01-27 DIAGNOSIS — F51.01 PRIMARY INSOMNIA: Primary | ICD-10-CM

## 2023-01-27 DIAGNOSIS — B37.2 CANDIDAL DERMATITIS: ICD-10-CM

## 2023-01-27 LAB
SL AMB  POCT GLUCOSE, UA: ABNORMAL
SL AMB LEUKOCYTE ESTERASE,UA: ABNORMAL
SL AMB POCT BILIRUBIN,UA: ABNORMAL
SL AMB POCT BLOOD,UA: ABNORMAL
SL AMB POCT CLARITY,UA: CLEAR
SL AMB POCT COLOR,UA: YELLOW
SL AMB POCT KETONES,UA: ABNORMAL
SL AMB POCT NITRITE,UA: ABNORMAL
SL AMB POCT PH,UA: 5
SL AMB POCT SPECIFIC GRAVITY,UA: 1.02
SL AMB POCT URINE PROTEIN: ABNORMAL
SL AMB POCT UROBILINOGEN: ABNORMAL

## 2023-01-27 RX ORDER — AZITHROMYCIN 250 MG/1
TABLET, FILM COATED ORAL
Qty: 6 TABLET | Refills: 0 | Status: SHIPPED | OUTPATIENT
Start: 2023-01-27 | End: 2023-01-31

## 2023-01-27 RX ORDER — ALBUTEROL SULFATE 90 UG/1
2 AEROSOL, METERED RESPIRATORY (INHALATION) EVERY 6 HOURS PRN
Qty: 6.7 G | Refills: 5 | Status: SHIPPED | OUTPATIENT
Start: 2023-01-27

## 2023-01-27 RX ORDER — HYDROXYZINE HYDROCHLORIDE 25 MG/1
25 TABLET, FILM COATED ORAL
Qty: 30 TABLET | Refills: 0 | Status: SHIPPED | OUTPATIENT
Start: 2023-01-27 | End: 2023-02-09 | Stop reason: SDUPTHER

## 2023-01-27 RX ORDER — CLOTRIMAZOLE AND BETAMETHASONE DIPROPIONATE 10; .64 MG/G; MG/G
CREAM TOPICAL 2 TIMES DAILY
Qty: 30 G | Refills: 1 | Status: SHIPPED | OUTPATIENT
Start: 2023-01-27

## 2023-01-27 NOTE — TELEPHONE ENCOUNTER
Patient states she has been having a lot of wheezing, coughing and nasal congestion with lot of green mucus     She denies any fever, chills, n/v/d, sore throat, ear pain    She has not tested for covid     She also states that she is having some pain in the middle of her lower back and is wondering if it could be from the UTI she had around 1/16    Please advise if you would like her to come in or if you want to send meds to the pharmacy

## 2023-01-27 NOTE — PROGRESS NOTES
Name: Dali Pham      : 1945      MRN: [id number]  Encounter Provider: Nori Mccartney MD  Encounter Date: 2023   Encounter department: [address] [place] [address]     1  Primary insomnia  Assessment & Plan:  Improved with hydroxyzine  Refilled medication  Continue all other meds  Recheck 6 to 8 weeks      2  Productive cough  Assessment & Plan: With wheeze  Start Zithromax as directed  Add albuterol HFA 2 puffs every 4-6 hours as needed  Before patient left this office she was educated proper use of an HFA inhaler  Recheck 1 week if not improved-earlier if worse    Orders:  -     azithromycin (ZITHROMAX) 250 mg tablet; 2 tab today then 1 tab po qd x 4 d  -     albuterol (Proventil HFA) 90 mcg/act inhaler; Inhale 2 puffs every 6 (six) hours as needed for wheezing    3  Candidal dermatitis  Assessment & Plan:  Reviewed with patient  Start Lotrisone cream twice daily for 1 to 2 weeks  Recheck 1 to 2 weeks if not improved-earlier if worse    Orders:  -     clotrimazole-betamethasone (LOTRISONE) 1-0 05 % cream; Apply topically 2 (two) times a day    4  Mid back pain  Assessment & Plan:  She does have some white blood cells in her urine though exam is more consistent with a muscular/skeletal etiology  Will check urine culture  Recommend Tylenol, ice and other conservative measures for now  Recheck Monday or Tuesday if not improved    Orders:  -     POCT urine dip  -     Urine culture    5  Urinary symptom or sign  Assessment & Plan:  I reviewed with patient  Patient does have some white blood cells in her urine  We will check culture  Await culture results  Patient to call next week with follow-up  Subjective     Pt here for f/u multiple issues  - Patient with persistent positional wheeze  Seems to be worse when she leans forward but also can occur with exertion  Worse in the morning and especially after taking a shower    Wheeze typically does improve after sitting for a minute or 2  Patient does have some occasional shortness of breath  Chest x-ray done recently was unremarkable  She has had intermittent chills but no definite fever  Pt denies CP, increased leg swelling or known increased weight  - pt states that she was sleeping better on hydoxyzine and would like a refill  Pt also taking 2 doses of imipramine qHS No side effects noted  - pt has been having some mid back pain  Pt with recent UTI and is not sure that these symptoms area related to the infection  She denies fever/chills, dysuria, or N/V  Pt notes sl increased frequency  - pt with persistent sl pruritic erythematous rash under her L breast   Pt would like this checked      Review of Systems   Constitutional: Positive for fatigue  Negative for activity change, appetite change, chills and fever  HENT: Negative  Eyes: Negative  Respiratory: Positive for cough, shortness of breath and wheezing  Cardiovascular: Negative  Gastrointestinal: Negative  Genitourinary: Positive for flank pain and frequency  Negative for decreased urine volume and difficulty urinating  Musculoskeletal: Positive for arthralgias and back pain  Negative for myalgias  Skin: Positive for rash  Neurological: Negative for dizziness, weakness, light-headedness and headaches  Past Medical History:   Diagnosis Date   • Gastroenteritis      Past Surgical History:   Procedure Laterality Date   • ATRIAL CARDIAC PACEMAKER INSERTION       Family History   Problem Relation Age of Onset   • Diabetes Sister    • Dementia Sister      Social History     Socioeconomic History   • Marital status:       Spouse name: None   • Number of children: None   • Years of education: None   • Highest education level: None   Occupational History   • None   Tobacco Use   • Smoking status: Former   • Smokeless tobacco: Never   Substance and Sexual Activity   • Alcohol use: Not Currently   • Drug use: Never   • Sexual activity: None   Other Topics Concern   • None   Social History Narrative   • None     Social Determinants of Health     Financial Resource Strain: Low Risk    • Difficulty of Paying Living Expenses: Not very hard   Food Insecurity: Not on file   Transportation Needs: No Transportation Needs   • Lack of Transportation (Medical): No   • Lack of Transportation (Non-Medical):  No   Physical Activity: Not on file   Stress: Not on file   Social Connections: Not on file   Intimate Partner Violence: Not on file   Housing Stability: Not on file     Current Outpatient Medications on File Prior to Visit   Medication Sig   • ascorbic acid (VITAMIN C) 500 mg tablet Take by mouth daily   • aspirin (ECOTRIN LOW STRENGTH) 81 mg EC tablet Take 81 mg by mouth daily   • atenolol (TENORMIN) 25 mg tablet Take 1 tablet (25 mg total) by mouth daily   • Blood Glucose Monitoring Suppl (Contour Next Monitor) w/Device KIT Use bid as directed   • chlordiazePOXIDE (LIBRIUM) 5 mg capsule Take 1 capsule (5 mg total) by mouth 2 (two) times a day   • Droplet Pen Needles 32G X 4 MM MISC USE ONCE DAILY FOR INSULIN INJECTION   • ezetimibe (ZETIA) 10 mg tablet Take 10 mg by mouth daily   • gabapentin (NEURONTIN) 100 mg capsule Take 100 mg by mouth 2 (two) times a day   • glucose blood (Contour Next Test) test strip Patient tests blood sugars twice daily   • hydrOXYzine HCL (ATARAX) 25 mg tablet Take 1 tablet (25 mg total) by mouth daily at bedtime   • imipramine (TOFRANIL) 25 mg tablet 1 tab in the morning and 2 tab qHS   • Insulin Glargine Solostar (Lantus SoloStar) 100 UNIT/ML SOPN Inject 0 4 mL (40 Units total) under the skin in the morning   • ketoconazole (NIZORAL) 2 % cream APPLY TOPICALLY TWICE DAILY UNTIL RASH CLEARS UNDER BREASTS   • losartan (COZAAR) 50 mg tablet Take 50 mg by mouth 2 (two) times a day   • Microlet Lancets MISC Patient tests blood glucose twice daily   • pantoprazole (PROTONIX) 40 mg tablet take 1 tablet by mouth twice a day 30 TO 60 MINUTES BEFORE BREAKFAST AND DINNER   • pramipexole (MIRAPEX) 0 75 MG tablet Take 1 tablet (0 75 mg total) by mouth every evening   • Promethazine-DM (PHENERGAN-DM) 6 25-15 mg/5 mL oral syrup Take 5 mL by mouth 4 (four) times a day as needed for cough   • RA Aspirin EC 81 MG EC tablet take 1 tablet by mouth once daily   • repaglinide (PRANDIN) 0 5 mg tablet Take 1 tablet (0 5 mg total) by mouth 3 (three) times a day before meals   • triamcinolone (KENALOG) 0 1 % cream Apply topically 2 (two) times a day   • Vascepa 1 g CAPS Take 2 capsules by mouth 2 (two) times a day   • Vitamin E 400 units TABS Take 2 tablets by mouth daily     Allergies   Allergen Reactions   • Atorvastatin      Reaction Date: 23Dec2011; Unknown reaction  LFT increased   • Clarithromycin      Reaction Date: 23Dec2011;   Patient does not recall reaction   • Hydrogen Peroxide Swelling     Reaction Date: 23Dec2011;   Swelling in the mouth   • Levofloxacin      Patient does not recall her reaction   • Other Swelling     Perioxol (mouth rinse)  Perioxol (mouth rinse)   • Penicillins Hives and Swelling   • Rosuvastatin      Reaction Date: 23Dec2011; Unknown reaction per patient  Increase LFT     Immunization History   Administered Date(s) Administered   • COVID-19 PFIZER VACCINE 0 3 ML IM 02/17/2021, 03/10/2021, 12/09/2021   • Tuberculin Skin Test-PPD Intradermal 04/14/2008       Objective     /88 (BP Location: Left arm, Patient Position: Sitting, Cuff Size: Adult)   Pulse 89   Temp (!) 97 4 °F (36 3 °C)   Resp 20   Ht 5' (1 524 m)   Wt 67 5 kg (148 lb 12 8 oz)   BMI 29 06 kg/m²     Physical Exam  Vitals reviewed  HENT:      Head: Normocephalic  Nose: No congestion  Mouth/Throat:      Mouth: Mucous membranes are moist    Eyes:      Extraocular Movements: Extraocular movements intact  Conjunctiva/sclera: Conjunctivae normal       Pupils: Pupils are equal, round, and reactive to light  Cardiovascular:      Rate and Rhythm: Normal rate and regular rhythm  Pulmonary:      Effort: Pulmonary effort is normal       Breath sounds: Wheezing (rare, scattered) present  No rales  Abdominal:      General: There is no distension  Palpations: There is no mass  Tenderness: There is no abdominal tenderness  There is no right CVA tenderness or left CVA tenderness  Musculoskeletal:         General: Tenderness (mild over mid back paraspinal muscles) and deformity (diffuse OA changes) present  Cervical back: No tenderness  Right lower leg: No edema  Left lower leg: No edema  Lymphadenopathy:      Cervical: No cervical adenopathy  Skin:     General: Skin is warm  Capillary Refill: Capillary refill takes less than 2 seconds  Neurological:      Mental Status: She is alert  Sensory: Sensory deficit present  Motor: No weakness  Gait: Gait normal    Psychiatric:         Behavior: Behavior normal          Thought Content:  Thought content normal        Alize Ogden MD

## 2023-01-29 PROBLEM — B37.2 CANDIDAL DERMATITIS: Status: ACTIVE | Noted: 2023-01-29

## 2023-01-29 PROBLEM — R05.8 PRODUCTIVE COUGH: Status: ACTIVE | Noted: 2023-01-29

## 2023-01-29 PROBLEM — M54.9 MID BACK PAIN: Status: ACTIVE | Noted: 2023-01-29

## 2023-01-29 PROBLEM — R39.9 URINARY SYMPTOM OR SIGN: Status: ACTIVE | Noted: 2023-01-29

## 2023-01-30 LAB — BACTERIA UR CULT: ABNORMAL

## 2023-01-30 NOTE — ASSESSMENT & PLAN NOTE
She does have some white blood cells in her urine though exam is more consistent with a muscular/skeletal etiology  Will check urine culture  Recommend Tylenol, ice and other conservative measures for now    Recheck Monday or Tuesday if not improved

## 2023-01-30 NOTE — ASSESSMENT & PLAN NOTE
I reviewed with patient  Patient does have some white blood cells in her urine  We will check culture  Await culture results  Patient to call next week with follow-up

## 2023-01-30 NOTE — ASSESSMENT & PLAN NOTE
Reviewed with patient  Start Lotrisone cream twice daily for 1 to 2 weeks    Recheck 1 to 2 weeks if not improved-earlier if worse

## 2023-01-30 NOTE — ASSESSMENT & PLAN NOTE
With wheeze  Start Zithromax as directed  Add albuterol HFA 2 puffs every 4-6 hours as needed  Before patient left this office she was educated proper use of an HFA inhaler    Recheck 1 week if not improved-earlier if worse

## 2023-01-31 ENCOUNTER — TELEPHONE (OUTPATIENT)
Dept: FAMILY MEDICINE CLINIC | Facility: CLINIC | Age: 78
End: 2023-01-31

## 2023-01-31 DIAGNOSIS — R30.0 DYSURIA: Primary | ICD-10-CM

## 2023-01-31 RX ORDER — SULFAMETHOXAZOLE AND TRIMETHOPRIM 800; 160 MG/1; MG/1
1 TABLET ORAL EVERY 12 HOURS SCHEDULED
Qty: 10 TABLET | Refills: 0 | Status: SHIPPED | OUTPATIENT
Start: 2023-01-31 | End: 2023-02-05

## 2023-01-31 NOTE — TELEPHONE ENCOUNTER
Patient is requesting that someone call her to go over the results of her urine culture     Also, she wanted to let you know that she is still not sleeping   She states she went to bed at 8 pm last night and woke up at 12 am and had to start walking around     She states she doesn't understand why she can't sleep through the night and this has been going on for months

## 2023-01-31 NOTE — TELEPHONE ENCOUNTER
Please advise  Patient is on Z-pack until tomorrow ,asking if she still needs this other antibiotic and if so when to start  Also states the Hydroxyzine is not helping now

## 2023-02-07 NOTE — TELEPHONE ENCOUNTER
Patient called stating that she is still experiencing some wheezing when she is laying down/ sitting on the couch  She states that when she is walking up steps she becomes out of breath  She is asking why this is still going on, and if she needs to come in for an appointment  She is aware that PCP is out of the office

## 2023-02-09 ENCOUNTER — OFFICE VISIT (OUTPATIENT)
Dept: FAMILY MEDICINE CLINIC | Facility: CLINIC | Age: 78
End: 2023-02-09

## 2023-02-09 VITALS
HEART RATE: 83 BPM | OXYGEN SATURATION: 93 % | DIASTOLIC BLOOD PRESSURE: 78 MMHG | TEMPERATURE: 97.6 F | BODY MASS INDEX: 29.06 KG/M2 | HEIGHT: 60 IN | SYSTOLIC BLOOD PRESSURE: 128 MMHG | WEIGHT: 148 LBS

## 2023-02-09 DIAGNOSIS — E11.9 TYPE 2 DIABETES MELLITUS WITHOUT COMPLICATION, WITHOUT LONG-TERM CURRENT USE OF INSULIN (HCC): Primary | ICD-10-CM

## 2023-02-09 DIAGNOSIS — F51.01 PRIMARY INSOMNIA: ICD-10-CM

## 2023-02-09 DIAGNOSIS — J45.20 MILD INTERMITTENT REACTIVE AIRWAY DISEASE WITHOUT COMPLICATION: ICD-10-CM

## 2023-02-09 DIAGNOSIS — G25.81 RESTLESS LEG: ICD-10-CM

## 2023-02-09 LAB — SL AMB POCT HEMOGLOBIN AIC: 7.8 (ref ?–6.5)

## 2023-02-09 RX ORDER — HYDROXYZINE HYDROCHLORIDE 25 MG/1
25 TABLET, FILM COATED ORAL
Qty: 30 TABLET | Refills: 0 | Status: SHIPPED | OUTPATIENT
Start: 2023-02-09

## 2023-02-09 RX ORDER — PRAMIPEXOLE DIHYDROCHLORIDE 0.75 MG/1
0.75 TABLET ORAL EVERY EVENING
Qty: 30 TABLET | Refills: 5 | Status: SHIPPED | OUTPATIENT
Start: 2023-02-09 | End: 2023-02-16 | Stop reason: SDUPTHER

## 2023-02-09 NOTE — ASSESSMENT & PLAN NOTE
Lab Results   Component Value Date    HGBA1C 7 8 (A) 02/09/2023   improving A1c  Follow up with pcp for routine management

## 2023-02-09 NOTE — PROGRESS NOTES
Name: Mary Kay Rasmussen      :       MRN: [id number]  Encounter Provider: Luetta Dakin, MD  Encounter Date: 2023   Encounter department: [address] [place] [address]     1  Type 2 diabetes mellitus without complication, without long-term current use of insulin ([facility])  -     POCT hemoglobin A1c    2  Mild intermittent reactive airway disease without complication  wheezing may be post-covid reactive airway disease  This may resolve on it's own in several more months, or may continue  We discussed trying alternate treatment like ICS inhaler or obtaining PFT to confirm the etiology, she declines       Subjective      HPI Patient reports ongoing wheezing  She also notes more phlegm, yellow  Clears it with coughing  She feels SOB sometimes when she's walking fast or going up or down the steps but no more than she ever has  She takes the albuterol twice a day and it reduces the wheezing but doesn't completely prevent it  Review of Systems   Constitutional: Negative for activity change, appetite change, chills and fever  HENT: Negative for congestion, sinus pressure and sore throat  Respiratory: Positive for cough, shortness of breath (baseline) and wheezing  Negative for chest tightness  All other systems reviewed and are negative        Current Outpatient Medications on File Prior to Visit   Medication Sig   • albuterol (Proventil HFA) 90 mcg/act inhaler Inhale 2 puffs every 6 (six) hours as needed for wheezing   • ascorbic acid (VITAMIN C) 500 mg tablet Take by mouth daily   • aspirin (ECOTRIN LOW STRENGTH) 81 mg EC tablet Take 81 mg by mouth daily   • atenolol (TENORMIN) 25 mg tablet Take 1 tablet (25 mg total) by mouth daily   • Blood Glucose Monitoring Suppl (Contour Next Monitor) w/Device KIT Use bid as directed   • chlordiazePOXIDE (LIBRIUM) 5 mg capsule Take 1 capsule (5 mg total) by mouth 2 (two) times a day   • clotrimazole-betamethasone (LOTRISONE) 1-0 05 % cream Apply topically 2 (two) times a day   • Droplet Pen Needles 32G X 4 MM MISC USE ONCE DAILY FOR INSULIN INJECTION   • ezetimibe (ZETIA) 10 mg tablet Take 10 mg by mouth daily   • gabapentin (NEURONTIN) 100 mg capsule Take 100 mg by mouth 2 (two) times a day   • glucose blood (Contour Next Test) test strip Patient tests blood sugars twice daily   • imipramine (TOFRANIL) 25 mg tablet 1 tab in the morning and 2 tab qHS   • Insulin Glargine Solostar (Lantus SoloStar) 100 UNIT/ML SOPN Inject 0 4 mL (40 Units total) under the skin in the morning   • ketoconazole (NIZORAL) 2 % cream APPLY TOPICALLY TWICE DAILY UNTIL RASH CLEARS UNDER BREASTS   • losartan (COZAAR) 50 mg tablet Take 50 mg by mouth 2 (two) times a day   • Microlet Lancets MISC Patient tests blood glucose twice daily   • pantoprazole (PROTONIX) 40 mg tablet take 1 tablet by mouth twice a day 30 TO 60 MINUTES BEFORE BREAKFAST AND DINNER   • Promethazine-DM (PHENERGAN-DM) 6 25-15 mg/5 mL oral syrup Take 5 mL by mouth 4 (four) times a day as needed for cough   • RA Aspirin EC 81 MG EC tablet take 1 tablet by mouth once daily   • repaglinide (PRANDIN) 0 5 mg tablet Take 1 tablet (0 5 mg total) by mouth 3 (three) times a day before meals   • triamcinolone (KENALOG) 0 1 % cream Apply topically 2 (two) times a day   • Vascepa 1 g CAPS Take 2 capsules by mouth 2 (two) times a day   • Vitamin E 400 units TABS Take 2 tablets by mouth daily   • [DISCONTINUED] hydrOXYzine HCL (ATARAX) 25 mg tablet Take 1 tablet (25 mg total) by mouth daily at bedtime   • [DISCONTINUED] pramipexole (MIRAPEX) 0 75 MG tablet Take 1 tablet (0 75 mg total) by mouth every evening       Objective     /78   Pulse 83   Temp 97 6 °F (36 4 °C)   Ht 5' (1 524 m)   Wt 67 1 kg (148 lb)   SpO2 93%   BMI 28 90 kg/m²     Physical Exam  Vitals and nursing note reviewed  Constitutional:       General: She is not in acute distress  Appearance: Normal appearance   She is well-developed  She is not ill-appearing or diaphoretic  HENT:      Head: Normocephalic and atraumatic  Right Ear: External ear normal       Left Ear: External ear normal       Nose: Nose normal    Eyes:      General: Lids are normal       Conjunctiva/sclera: Conjunctivae normal    Neck:      Vascular: No JVD  Trachea: No tracheal deviation  Pulmonary:      Effort: No accessory muscle usage or respiratory distress  Breath sounds: Examination of the right-lower field reveals rales  Rales present  No decreased breath sounds, wheezing or rhonchi  Skin:     Findings: No rash  Neurological:      Mental Status: She is alert         Jesse Villafana MD

## 2023-02-16 DIAGNOSIS — G25.81 RESTLESS LEG: ICD-10-CM

## 2023-02-16 RX ORDER — PRAMIPEXOLE DIHYDROCHLORIDE 0.75 MG/1
0.75 TABLET ORAL EVERY EVENING
Qty: 30 TABLET | Refills: 5 | Status: SHIPPED | OUTPATIENT
Start: 2023-02-16

## 2023-02-23 ENCOUNTER — OFFICE VISIT (OUTPATIENT)
Dept: FAMILY MEDICINE CLINIC | Facility: CLINIC | Age: 78
End: 2023-02-23

## 2023-02-23 VITALS
TEMPERATURE: 96.6 F | DIASTOLIC BLOOD PRESSURE: 98 MMHG | HEART RATE: 80 BPM | SYSTOLIC BLOOD PRESSURE: 170 MMHG | OXYGEN SATURATION: 98 % | WEIGHT: 150 LBS | BODY MASS INDEX: 29.45 KG/M2 | HEIGHT: 60 IN

## 2023-02-23 DIAGNOSIS — F41.9 ANXIETY: ICD-10-CM

## 2023-02-23 DIAGNOSIS — M54.9 MID BACK PAIN: ICD-10-CM

## 2023-02-23 DIAGNOSIS — R10.9 LEFT FLANK PAIN: Primary | ICD-10-CM

## 2023-02-23 DIAGNOSIS — I73.9 CLAUDICATION IN PERIPHERAL VASCULAR DISEASE (HCC): ICD-10-CM

## 2023-02-23 LAB
SL AMB  POCT GLUCOSE, UA: NORMAL
SL AMB LEUKOCYTE ESTERASE,UA: NORMAL
SL AMB POCT BILIRUBIN,UA: NORMAL
SL AMB POCT BLOOD,UA: NORMAL
SL AMB POCT CLARITY,UA: CLEAR
SL AMB POCT COLOR,UA: NORMAL
SL AMB POCT KETONES,UA: NORMAL
SL AMB POCT NITRITE,UA: NORMAL
SL AMB POCT PH,UA: 5.5
SL AMB POCT SPECIFIC GRAVITY,UA: 1.01
SL AMB POCT URINE PROTEIN: NORMAL
SL AMB POCT UROBILINOGEN: NORMAL

## 2023-02-23 RX ORDER — LIDOCAINE 50 MG/G
1 PATCH TOPICAL DAILY
Qty: 30 PATCH | Refills: 0 | Status: SHIPPED | OUTPATIENT
Start: 2023-02-23 | End: 2023-02-24 | Stop reason: DRUGHIGH

## 2023-02-23 NOTE — PROGRESS NOTES
Name: Jigar Sage      : 1945      MRN: [id number]  Encounter Provider: Sigifredo Pugh MD  Encounter Date: 2023   Encounter department: [address] [place] [address] Road     1  Left flank pain  Assessment & Plan:  Appears to be musculoskeletal   Urinalysis was unremarkable  Strongly recommend that patient avoid nonsteroidals due to her recent heart disease and surgery  Recommend lactic Derm patch as directed  Also recommended physical therapy-patient would like to hold off for now  Check x-ray of the back  Recheck 1 week if not improved-earlier if worse    Orders:  -     XR spine lumbar minimum 4 views non injury; Future; Expected date: 2023  -     POCT urine dip auto non-scope  -     lidocaine (Lidoderm) 5 %; Apply 1 patch topically over 12 hours daily Remove & Discard patch within 12 hours or as directed by MD    2  Mid back pain  Assessment & Plan:  Appears to be musculoskeletal   Urinalysis was unremarkable  Strongly recommend that patient avoid nonsteroidals due to her recent heart disease and surgery  Recommend lactic Derm patch as directed  Also recommended physical therapy-patient would like to hold off for now  Check x-ray of the back  Recheck 1 week if not improved-earlier if worse    Orders:  -     XR spine lumbar minimum 4 views non injury; Future; Expected date: 2023  -     lidocaine (Lidoderm) 5 %; Apply 1 patch topically over 12 hours daily Remove & Discard patch within 12 hours or as directed by MD    3  Claudication in peripheral vascular disease [facility])  Assessment & Plan:  Symptoms are concerning for significant peripheral arterial disease  I carefully discussed this with patient  Patient states that she will contact vascular surgery at [facility]  I did order a vascular I ordered a lower extremity arterial duplex that patient can get done at [facility]    Encourage patient to notify this office when she has an appointment as well as with her discussion with follow-up regarding her discussion with vascular surgery  Recheck 4 weeks    Orders:  -     VAS lower limb arterial duplex, complete bilateral; Future; Expected date: 02/23/2023           Subjective     Pt here for a couple of concerns  - 54-year-old female presents with a 4-day history of left mid back/CVA area pain  Patient states that pain started Monday, is better at rest/sitting and worse when turning in bed or getting out of a chair  Patient denies any pain shooting into her legs or into her abdomen  Patient denies any changes in bowel or bladder including but not limited to dysuria, hematuria, diarrhea or constipation    - Patient also notes persistent right calf discomfort with ambulation  Patient mention this to us last summer  At that time we recommended a arterial duplex of the legs-unfortunately patient did not have this done  She is not sure if it is getting worse  She does follow-up with vascular surgery at [facility] regarding her carotids  Patient needs to have studies and testing done at [facility] due to her insurance plan  Review of Systems   Constitutional: Negative  Respiratory: Negative  Cardiovascular: Negative  Negative for chest pain, palpitations and leg swelling  Gastrointestinal: Negative  Musculoskeletal: Positive for back pain, gait problem and myalgias  Negative for arthralgias and joint swelling  Skin: Negative  Neurological: Negative for dizziness, weakness, light-headedness and numbness  Past Medical History:   Diagnosis Date   • Gastroenteritis      Past Surgical History:   Procedure Laterality Date   • ATRIAL CARDIAC PACEMAKER INSERTION       Family History   Problem Relation Age of Onset   • Diabetes Sister    • Dementia Sister      Social History     Socioeconomic History   • Marital status:       Spouse name: None   • Number of children: None   • Years of education: None   • Highest education level: None   Occupational History   • None   Tobacco Use   • Smoking status: Former   • Smokeless tobacco: Never   Substance and Sexual Activity   • Alcohol use: Not Currently   • Drug use: Never   • Sexual activity: None   Other Topics Concern   • None   Social History Narrative   • None     Social Determinants of Health     Financial Resource Strain: Low Risk    • Difficulty of Paying Living Expenses: Not very hard   Food Insecurity: Not on file   Transportation Needs: No Transportation Needs   • Lack of Transportation (Medical): No   • Lack of Transportation (Non-Medical):  No   Physical Activity: Not on file   Stress: Not on file   Social Connections: Not on file   Intimate Partner Violence: Not on file   Housing Stability: Not on file     Current Outpatient Medications on File Prior to Visit   Medication Sig   • pramipexole (MIRAPEX) 0 75 MG tablet Take 1 tablet (0 75 mg total) by mouth every evening   • albuterol (Proventil HFA) 90 mcg/act inhaler Inhale 2 puffs every 6 (six) hours as needed for wheezing   • ascorbic acid (VITAMIN C) 500 mg tablet Take by mouth daily   • aspirin (ECOTRIN LOW STRENGTH) 81 mg EC tablet Take 81 mg by mouth daily   • atenolol (TENORMIN) 25 mg tablet Take 1 tablet (25 mg total) by mouth daily   • Blood Glucose Monitoring Suppl (Contour Next Monitor) w/Device KIT Use bid as directed   • chlordiazePOXIDE (LIBRIUM) 5 mg capsule Take 1 capsule (5 mg total) by mouth 2 (two) times a day   • clotrimazole-betamethasone (LOTRISONE) 1-0 05 % cream Apply topically 2 (two) times a day   • Droplet Pen Needles 32G X 4 MM MISC USE ONCE DAILY FOR INSULIN INJECTION   • ezetimibe (ZETIA) 10 mg tablet Take 10 mg by mouth daily   • gabapentin (NEURONTIN) 100 mg capsule Take 100 mg by mouth 2 (two) times a day   • glucose blood (Contour Next Test) test strip Patient tests blood sugars twice daily   • hydrOXYzine HCL (ATARAX) 25 mg tablet Take 1 tablet (25 mg total) by mouth daily at bedtime • imipramine (TOFRANIL) 25 mg tablet 1 tab in the morning and 2 tab qHS   • Insulin Glargine Solostar (Lantus SoloStar) 100 UNIT/ML SOPN Inject 0 4 mL (40 Units total) under the skin in the morning   • ketoconazole (NIZORAL) 2 % cream APPLY TOPICALLY TWICE DAILY UNTIL RASH CLEARS UNDER BREASTS   • losartan (COZAAR) 50 mg tablet Take 50 mg by mouth 2 (two) times a day   • Microlet Lancets MISC Patient tests blood glucose twice daily   • pantoprazole (PROTONIX) 40 mg tablet take 1 tablet by mouth twice a day 30 TO 60 MINUTES BEFORE BREAKFAST AND DINNER   • Promethazine-DM (PHENERGAN-DM) 6 25-15 mg/5 mL oral syrup Take 5 mL by mouth 4 (four) times a day as needed for cough   • RA Aspirin EC 81 MG EC tablet take 1 tablet by mouth once daily   • repaglinide (PRANDIN) 0 5 mg tablet Take 1 tablet (0 5 mg total) by mouth 3 (three) times a day before meals   • triamcinolone (KENALOG) 0 1 % cream Apply topically 2 (two) times a day   • Vascepa 1 g CAPS Take 2 capsules by mouth 2 (two) times a day   • Vitamin E 400 units TABS Take 2 tablets by mouth daily     Allergies   Allergen Reactions   • Atorvastatin      Reaction Date: 23Dec2011; Unknown reaction  LFT increased   • Clarithromycin      Reaction Date: 23Dec2011;   Patient does not recall reaction   • Hydrogen Peroxide Swelling     Reaction Date: 23Dec2011;   Swelling in the mouth   • Levofloxacin      Patient does not recall her reaction   • Other Swelling     Perioxol (mouth rinse)  Perioxol (mouth rinse)   • Penicillins Hives and Swelling   • Rosuvastatin      Reaction Date: 23Dec2011;    Unknown reaction per patient  Increase LFT     Immunization History   Administered Date(s) Administered   • COVID-19 PFIZER VACCINE 0 3 ML IM 02/17/2021, 03/10/2021, 12/09/2021   • Tuberculin Skin Test-PPD Intradermal 04/14/2008       Objective     /98 (BP Location: Left arm, Patient Position: Sitting, Cuff Size: Adult)   Pulse 80   Temp (!) 96 6 °F (35 9 °C)   Ht 5' (1 524 m)   Wt 68 kg (150 lb)   SpO2 98%   BMI 29 29 kg/m²     Physical Exam  Vitals and nursing note reviewed  Constitutional:       Comments: Uncomfortable appearing female in no respiratory distress   Cardiovascular:      Rate and Rhythm: Normal rate and regular rhythm  Comments: Unable to appreciate pedal pulses or popliteal pulses bilaterally  Pulmonary:      Effort: Pulmonary effort is normal    Abdominal:      General: There is no distension  Palpations: There is no mass  Tenderness: There is no abdominal tenderness  There is no right CVA tenderness or left CVA tenderness  Musculoskeletal:         General: Tenderness (Mild tenderness in the left  Upper paralumbar area to palpation  Spinous processes are nontender to palpation  There is no CVA tenderness appreciated  Straight leg raise is negative bilaterally ) and deformity (Diffuse arthritic changes) present  Right lower leg: Edema (Trace) present  Left lower leg: Edema (Trace) present  Skin:     General: Skin is warm  Capillary Refill: Capillary refill takes less than 2 seconds  Neurological:      Mental Status: She is alert  Sensory: No sensory deficit  Motor: No weakness  Gait: Gait abnormal (Antalgic appearing gait  Increased pain with rising from seated position)  Deep Tendon Reflexes: Reflexes abnormal (Decrease reflexes in the legs bilaterally)         Linda Edwards MD

## 2023-02-23 NOTE — TELEPHONE ENCOUNTER
Pt states her insurance will not cover the 5% Lidocaine patch but will cover the 4%  Patient would like to know if you can change this and send the new script over to the pharmacy      Pt # [phone]

## 2023-02-24 DIAGNOSIS — M54.9 MID BACK PAIN: ICD-10-CM

## 2023-02-24 DIAGNOSIS — R10.9 LEFT FLANK PAIN: Primary | ICD-10-CM

## 2023-02-24 RX ORDER — LIDOCAINE 4 G/G
1 PATCH TOPICAL DAILY
Qty: 30 PATCH | Refills: 0 | Status: SHIPPED | OUTPATIENT
Start: 2023-02-24 | End: 2023-03-20 | Stop reason: SDUPTHER

## 2023-02-24 RX ORDER — CHLORDIAZEPOXIDE HYDROCHLORIDE 5 MG/1
5 CAPSULE, GELATIN COATED ORAL 2 TIMES DAILY
Qty: 60 CAPSULE | Refills: 0 | Status: SHIPPED | OUTPATIENT
Start: 2023-02-24

## 2023-02-24 NOTE — ASSESSMENT & PLAN NOTE
Symptoms are concerning for significant peripheral arterial disease  I carefully discussed this with patient  Patient states that she will contact vascular surgery at [facility]  I did order a vascular I ordered a lower extremity arterial duplex that patient can get done at [facility]  Encourage patient to notify this office when she has an appointment as well as with her discussion with follow-up regarding her discussion with vascular surgery    Recheck 4 weeks

## 2023-02-24 NOTE — ASSESSMENT & PLAN NOTE
Appears to be musculoskeletal   Urinalysis was unremarkable  Strongly recommend that patient avoid nonsteroidals due to her recent heart disease and surgery  Recommend lactic Derm patch as directed  Also recommended physical therapy-patient would like to hold off for now  Check x-ray of the back    Recheck 1 week if not improved-earlier if worse

## 2023-02-25 ENCOUNTER — NURSE TRIAGE (OUTPATIENT)
Dept: OTHER | Facility: OTHER | Age: 78
End: 2023-02-25

## 2023-02-25 NOTE — TELEPHONE ENCOUNTER
Regarding: lower back pain  ----- Message from María Valentin sent at 2/25/2023 12:22 AM EST -----  Pt called, " I have pain on my lower back  I am in so much pain that I scream when I try to get up    I tried tylenol and a pain patch, but the pain is still there "

## 2023-02-25 NOTE — TELEPHONE ENCOUNTER
Reason for Disposition  • [1] SEVERE back pain (e g , excruciating, unable to do any normal activities) AND [2] not improved 2 hours after pain medicine    Answer Assessment - Initial Assessment Questions  1  ONSET: "When did the pain begin?"      Thursday   2  LOCATION: "Where does it hurt?" (upper, mid or lower back)    Lower back   3  SEVERITY: "How bad is the pain?"  (e g , Scale 1-10; mild, moderate, or severe)    - MILD (1-3): doesn't interfere with normal activities     - MODERATE (4-7): interferes with normal activities or awakens from sleep     - SEVERE (8-10): excruciating pain, unable to do any normal activities       Severe 10/10  4  PATTERN: "Is the pain constant?" (e g , yes, no; constant, intermittent)       Intermittent   5  RADIATION: "Does the pain shoot into your legs or elsewhere?"   Denies   6  CAUSE:  "What do you think is causing the back pain?"      Unsure   7  BACK OVERUSE:  "Any recent lifting of heavy objects, strenuous work or exercise?"     Denies   8  MEDICATIONS: "What have you taken so far for the pain?" (e g , nothing, acetaminophen, NSAIDS)   Tylenol, Lidocaine patch   9  NEUROLOGIC SYMPTOMS: "Do you have any weakness, numbness, or problems with bowel/bladder control?"      Denies   10   OTHER SYMPTOMS: "Do you have any other symptoms?" (e g , fever, abdominal pain, burning with urination, blood in urine)       Denies    Protocols used: BACK PAIN-ADULT-

## 2023-02-25 NOTE — TELEPHONE ENCOUNTER
C/o severe back pain with movement rated 10/10 despite tylenol, and lidocaine  Able to ambulate with difficulty  No additional symptoms reported  Care advice given  Verbalized understanding and agreeable with disposition  No further questions  Home Oxygen Evaluation    Patient seen and evaluated for home oxygen needs per physician order.  A summary of the evaluation is listed below.       04/02/19 1113 04/02/19 1130 04/02/19 1133   Exercise (Home) O2 Eval   Liter Flow 0  (forehead sensor used) 0 1  (straight flow)   Heart rate at rest 66 beats/min --  --    SpO2 at rest 97 % --  --    Heart rate during activity --  85 beats/min 72 beats/min   SpO2 during activity --  87 % 88 %   Heart rate after activity --  --  --    SpO2 after activity --  --  --    Distance (feet) --  --  --    Tolerance --  --  --    $ Exercise O2 procedure complete (Pulmonary Stress Test) --  --  --        04/02/19 1136 04/02/19 1145   Exercise (Home) O2 Eval   Liter Flow 2  (straight flow) 0   Heart rate at rest --  --    SpO2 at rest --  --    Heart rate during activity 96 beats/min --    SpO2 during activity 94 % --    Heart rate after activity --  72 beats/min   SpO2 after activity --  96 %   Distance (feet) --  350 ft   Tolerance --  well   $ Exercise O2 procedure complete (Pulmonary Stress Test) --  Procedure Complete       Recommendations are as follows: Patient qualifies for home oxygen.  Patient already has oxygen through [facility].  Patient requires 0 lpm at rest and 2 lpm with activity via nasal cannula using straight flow regulator.  Patient will need oxygen tank and cart from [facility] for these items were lost when patient was admitted.  RN aware.

## 2023-02-26 ENCOUNTER — NURSE TRIAGE (OUTPATIENT)
Dept: OTHER | Facility: OTHER | Age: 78
End: 2023-02-26

## 2023-02-27 NOTE — TELEPHONE ENCOUNTER
Regarding: pain/ request formedical advice  ----- Message from Naveen Rosado sent at 2/26/2023  8:25 PM EST -----  Pt called, " I am in so much pain, I don't know what to do "

## 2023-02-27 NOTE — TELEPHONE ENCOUNTER
Reason for Disposition  • [1] SEVERE back pain (e g , excruciating, unable to do any normal activities) AND [2] not improved 2 hours after pain medicine    Answer Assessment - Initial Assessment Questions  1  ONSET: "When did the pain begin?"       Thursday  2  LOCATION: "Where does it hurt?" (upper, mid or lower back)      Left lower back  3  SEVERITY: "How bad is the pain?"  (e g , Scale 1-10; mild, moderate, or severe)    - MILD (1-3): doesn't interfere with normal activities     - MODERATE (4-7): interferes with normal activities or awakens from sleep     - SEVERE (8-10): excruciating pain, unable to do any normal activities       10/10  4  PATTERN: "Is the pain constant?" (e g , yes, no; constant, intermittent)       States the pain is present with any movement  5  RADIATION: "Does the pain shoot into your legs or elsewhere?"      Denies radiation of pain  6  CAUSE:  "What do you think is causing the back pain?"       Unsure  7  BACK OVERUSE:  "Any recent lifting of heavy objects, strenuous work or exercise?"      Denies  8  MEDICATIONS: "What have you taken so far for the pain?" (e g , nothing, acetaminophen, NSAIDS)      Tylenol, Lidocaine patch, -Have not been effective for the pain  9  NEUROLOGIC SYMPTOMS: "Do you have any weakness, numbness, or problems with bowel/bladder control?"      Denies  10  OTHER SYMPTOMS: "Do you have any other symptoms?" (e g , fever, abdominal pain, burning with urination, blood in urine)        Denies    Protocols used: BACK PAIN-ADULT-    C/o severe 10/10 lower back pain  Denies any other symptoms  Was in ER on Friday for same  Recommended ED eval again due to severe pain  Pt  States she does not want to go to the ED  Reinforced importance of ED eval but unsure if pt will follow dispo

## 2023-03-01 ENCOUNTER — TRANSITIONAL CARE MANAGEMENT (OUTPATIENT)
Dept: FAMILY MEDICINE CLINIC | Facility: CLINIC | Age: 78
End: 2023-03-01

## 2023-03-03 ENCOUNTER — TELEPHONE (OUTPATIENT)
Dept: FAMILY MEDICINE CLINIC | Facility: CLINIC | Age: 78
End: 2023-03-03

## 2023-03-03 DIAGNOSIS — R10.9 LEFT FLANK PAIN: Primary | ICD-10-CM

## 2023-03-03 DIAGNOSIS — M54.9 MID BACK PAIN: ICD-10-CM

## 2023-03-03 DIAGNOSIS — R10.9 LEFT FLANK PAIN: ICD-10-CM

## 2023-03-03 DIAGNOSIS — K59.00 CONSTIPATION, UNSPECIFIED CONSTIPATION TYPE: Primary | ICD-10-CM

## 2023-03-03 PROBLEM — M54.50 ACUTE LOW BACK PAIN: Status: ACTIVE | Noted: 2023-02-27

## 2023-03-03 RX ORDER — ACETAMINOPHEN AND CODEINE PHOSPHATE 300; 30 MG/1; MG/1
1 TABLET ORAL EVERY 6 HOURS PRN
Qty: 30 TABLET | Refills: 0 | Status: SHIPPED | OUTPATIENT
Start: 2023-03-03

## 2023-03-03 RX ORDER — MELOXICAM 7.5 MG/1
7.5 TABLET ORAL DAILY
Qty: 20 TABLET | Refills: 0 | Status: SHIPPED | OUTPATIENT
Start: 2023-03-03 | End: 2023-08-14 | Stop reason: ALTCHOICE

## 2023-03-03 RX ORDER — POLYETHYLENE GLYCOL 3350 17 G/17G
17 POWDER, FOR SOLUTION ORAL DAILY
Qty: 510 G | Refills: 1 | Status: SHIPPED | OUTPATIENT
Start: 2023-03-03 | End: 2023-08-14 | Stop reason: ALTCHOICE

## 2023-03-03 NOTE — TELEPHONE ENCOUNTER
Patient was taking medication called Meloscham 15 mg  Patient was given this prescription at the hospital and would like to know if this is for pain and if you are able to fill this for her  Pharmacy : [facility]   Pt # [phone]    Patient has a scheduled apt on 3/6 to discuss rest of her medication

## 2023-03-03 NOTE — PROGRESS NOTES
Spoke with patient She is still in severe pain  Gabapentin helps a little if she is still, but pain returns at a severe level when she moves  Will call in Tylenol #3 to be used 1 q6h prn pc   Pt to call next week with follow up

## 2023-03-06 ENCOUNTER — OFFICE VISIT (OUTPATIENT)
Dept: FAMILY MEDICINE CLINIC | Facility: CLINIC | Age: 78
End: 2023-03-06

## 2023-03-06 VITALS
SYSTOLIC BLOOD PRESSURE: 130 MMHG | DIASTOLIC BLOOD PRESSURE: 74 MMHG | TEMPERATURE: 97.3 F | WEIGHT: 155 LBS | BODY MASS INDEX: 30.43 KG/M2 | HEIGHT: 60 IN | HEART RATE: 70 BPM

## 2023-03-06 DIAGNOSIS — Z79.4 TYPE 2 DIABETES MELLITUS WITH HYPERGLYCEMIA, WITH LONG-TERM CURRENT USE OF INSULIN (HCC): ICD-10-CM

## 2023-03-06 DIAGNOSIS — R10.9 LEFT FLANK PAIN: ICD-10-CM

## 2023-03-06 DIAGNOSIS — M54.50 CHRONIC LEFT-SIDED LOW BACK PAIN WITHOUT SCIATICA: ICD-10-CM

## 2023-03-06 DIAGNOSIS — Z76.89 ENCOUNTER FOR SUPPORT AND COORDINATION OF TRANSITION OF CARE: Primary | ICD-10-CM

## 2023-03-06 DIAGNOSIS — G89.29 CHRONIC LEFT-SIDED LOW BACK PAIN WITHOUT SCIATICA: ICD-10-CM

## 2023-03-06 DIAGNOSIS — E11.65 TYPE 2 DIABETES MELLITUS WITH HYPERGLYCEMIA, WITH LONG-TERM CURRENT USE OF INSULIN (HCC): ICD-10-CM

## 2023-03-06 RX ORDER — METHOCARBAMOL 750 MG/1
750 TABLET, FILM COATED ORAL 3 TIMES DAILY PRN
Qty: 90 TABLET | Refills: 1 | Status: SHIPPED | OUTPATIENT
Start: 2023-03-06

## 2023-03-06 RX ORDER — GABAPENTIN 100 MG/1
100 CAPSULE ORAL 3 TIMES DAILY
Qty: 90 CAPSULE | Refills: 3 | Status: SHIPPED | OUTPATIENT
Start: 2023-03-06

## 2023-03-06 RX ORDER — METHOCARBAMOL 750 MG/1
750 TABLET, FILM COATED ORAL 3 TIMES DAILY PRN
COMMUNITY
Start: 2023-02-25 | End: 2023-03-06 | Stop reason: SDUPTHER

## 2023-03-06 RX ORDER — MELOXICAM 15 MG/1
TABLET ORAL
COMMUNITY
Start: 2023-02-25 | End: 2023-03-06 | Stop reason: ALTCHOICE

## 2023-03-06 NOTE — PROGRESS NOTES
Assessment & Plan     1  Encounter for support and coordination of transition of care    2  Left flank pain  -     gabapentin (NEURONTIN) 100 mg capsule; Take 1 capsule (100 mg total) by mouth 3 (three) times a day  -     methocarbamol (ROBAXIN) 750 mg tablet; Take 1 tablet (750 mg total) by mouth 3 (three) times a day as needed for muscle spasms    3  Chronic left-sided low back pain without sciatica  -     Ambulatory Referral to Pain Management; Future  -     gabapentin (NEURONTIN) 100 mg capsule; Take 1 capsule (100 mg total) by mouth 3 (three) times a day  -     methocarbamol (ROBAXIN) 750 mg tablet; Take 1 tablet (750 mg total) by mouth 3 (three) times a day as needed for muscle spasms    4  Type 2 diabetes mellitus with hyperglycemia, with long-term current use of insulin [facility])  Assessment & Plan:    Lab Results   Component Value Date    HGBA1C 7 8 (H) 02/27/2023         Discussion: Reviewed all with patient and her partner  - In regards to her left flank pain, no pathologic cause was noted  Patient does have degenerative disc disease with spondylosis which is the most likely cause  Patient was taking gabapentin 100 mg 3 times a day and may have had some improvement with this, but was no longer taking for some reason  I will restart this medication  Patient also received Robaxin from the hospital which seems to help-this was refilled  Continue Tylenol 3 1/2 tablet every 6 hours as needed for breakthrough pain  Side effects of all medications were discussed with patient  We also discussed physical therapy  Patient wants to watch for now  She will follow-up by phone in 1 week-earlier if pain should worsen  - In regards to her labile blood sugars, this may be related to adrenaline surges related to pain  Diet does not appear to be changed  Patient is less active due to pain which may also be playing a role  Recommend continued monitoring  Hopefully blood sugars will improve as pain improves    Recent A1c was 7 8 on February 27  Will recommend that we recheck an A1c in 3 months    Patient to follow-up as above  Patient to call for any other problems or concerns in the interim     Subjective     Transitional Care Management Review:   Ariella Hastings is a 68 y o  female here for TCM follow up  During the TCM phone call patient stated:  TCM Call     Date and time call was made  3/1/2023 12:45 PM    Hospital care reviewed  Records reviewed    Patient was hospitialized at  [facility]    Date of Admission  02/25/23    Date of discharge  02/26/23    Diagnosis  low back pain    Disposition  Home    Were the patients medications reviewed and updated  Yes    Current Symptoms  Back pain - left side; Back pain - right side    Cough Severity  Mild    Weakness severity  Severe    Back pain, left side, severity  Moderate    Back pain, left side, onset  Sudden    Back pain, right side, severity  Moderate    Back pain, right side, onset  Sudden      TCM Call     Post hospital issues  None    Should patient be enrolled in anticoag monitoring? No    Scheduled for follow up?   Yes    Did you obtain your prescribed medications  Yes    Why were you unable to obtain your medications  going to doctor tomorrow    Do you need help managing your prescriptions or medications  No    Is transportation to your appointment needed  No    I have advised the patient to call PCP with any new or worsening symptoms  [address] [employer] [employer] or [facility] other    Support System  Family    The type of support provided  Physical; Emotional    Are you recieving any outpatient services  No    Are you recieving home care services  Yes    Types of home care services  Nurse visit    Are you using any community resources  No    Current waiver services  No    Have you fallen in the last 12 months  No    Interperter language line needed  No        Pt here for TCM visit  - 67 yo female with recent onset of L flank pain, that was not improving with conservative care, presented to [place] [address] ED on 2/25 for worsening back pain causing difficulty ambulation  In the ED, exam did not reveal weakness, saddle anesthesia or other significant findings  CT of the abd/pelvis was negative for acute intra-abd process  Pt was admitted for pain control  PT was consulted  Pt did well over the next coupole of days  Pain did not resolve but did improve  PT did not feel that pt needed inpatient rehab, and pt was abd to be discharged to home on 2/28  Pt here for TCM visit  - since discharge, pt states that pain is tolerable if she "sits still" but increases dramatically if she moves  Ambulation is still difficult  Pt was not on any pain meds, so I called in Tyl #3 for her  She states that this helps - a whole tab makes her feel "loopy" so she has been taking 1/2 tab and has only taken 2 or 3 doses so far  She denies any worsening or changing symptoms including leg weakness, sciatica pain, or saddle anesthesia  BGs have been a little labile during this time though she denies change in pain  Pt denies abd pain, dysuria/hematuria, CV or resp complaints  - I reviewed available hospital records, lab results and study reports with pt and partner      Review of Systems   Constitutional: Positive for fatigue  Negative for activity change, appetite change, chills and fever  HENT: Negative  Eyes: Negative  Respiratory: Negative  Cardiovascular: Negative  Gastrointestinal: Negative  Genitourinary: Negative  Musculoskeletal: Positive for arthralgias, back pain, gait problem and myalgias  Negative for neck pain and neck stiffness  Skin: Negative  Neurological: Negative for dizziness, weakness, light-headedness, numbness and headaches         Objective     /74 (BP Location: Left arm, Patient Position: Sitting, Cuff Size: Adult)   Pulse 70   Temp (!) 97 3 °F (36 3 °C)   Ht 5' (1 524 m)   Wt 70 3 kg (155 lb)   BMI 30 27 kg/m²      Physical Exam  Vitals reviewed  HENT:      Head: Normocephalic  Mouth/Throat:      Mouth: Mucous membranes are moist    Eyes:      Extraocular Movements: Extraocular movements intact  Conjunctiva/sclera: Conjunctivae normal       Pupils: Pupils are equal, round, and reactive to light  Cardiovascular:      Rate and Rhythm: Normal rate and regular rhythm  Pulses: Normal pulses  Pulmonary:      Effort: Pulmonary effort is normal       Breath sounds: No wheezing or rales  Abdominal:      General: There is no distension  Palpations: There is no mass  Tenderness: There is no abdominal tenderness  There is no right CVA tenderness or left CVA tenderness  Musculoskeletal:         General: Tenderness (mild L low back/flank  Increase with movement) and deformity present  No swelling  Cervical back: No tenderness  Right lower leg: No edema  Left lower leg: No edema  Lymphadenopathy:      Cervical: No cervical adenopathy  Skin:     Capillary Refill: Capillary refill takes less than 2 seconds  Neurological:      General: No focal deficit present  Mental Status: She is alert and oriented to person, place, and time  Cranial Nerves: No cranial nerve deficit  Sensory: No sensory deficit  Motor: No weakness  Gait: Gait abnormal (antalgic appearing gait)        Deep Tendon Reflexes: Reflexes normal        Medications have been reviewed by provider in current encounter    Candace Hernandez MD

## 2023-03-07 ENCOUNTER — TELEPHONE (OUTPATIENT)
Dept: FAMILY MEDICINE CLINIC | Facility: CLINIC | Age: 78
End: 2023-03-07

## 2023-03-07 DIAGNOSIS — Z79.4 TYPE 2 DIABETES MELLITUS WITH HYPERGLYCEMIA, WITH LONG-TERM CURRENT USE OF INSULIN (HCC): Primary | ICD-10-CM

## 2023-03-07 DIAGNOSIS — E11.65 TYPE 2 DIABETES MELLITUS WITH HYPERGLYCEMIA, WITH LONG-TERM CURRENT USE OF INSULIN (HCC): Primary | ICD-10-CM

## 2023-03-07 RX ORDER — PEN NEEDLE, DIABETIC 32GX 5/32"
NEEDLE, DISPOSABLE MISCELLANEOUS
Status: CANCELLED | OUTPATIENT
Start: 2023-03-07

## 2023-03-07 RX ORDER — PEN NEEDLE, DIABETIC 32GX 5/32"
NEEDLE, DISPOSABLE MISCELLANEOUS DAILY
Qty: 50 EACH | Refills: 5 | Status: SHIPPED | OUTPATIENT
Start: 2023-03-07

## 2023-03-07 NOTE — TELEPHONE ENCOUNTER
Patient is requesting that a referral be put into for [facility] pain management  Patient was given a fax # [phone] for a referral to be sent over so she can get an appointment scheduled  Dx chronic left side lower back      Pt # [phone]

## 2023-03-09 ENCOUNTER — TELEPHONE (OUTPATIENT)
Dept: FAMILY MEDICINE CLINIC | Facility: CLINIC | Age: 78
End: 2023-03-09

## 2023-03-09 NOTE — TELEPHONE ENCOUNTER
Jony Mckeon the home care nurse called  Jackie BP has been trending upwards  The diastolic has been normal but the systolic has beening ranging between 158-184  Currently 166 after resting  Nurse reports +1 pitting edema B/L lower extremities  Denies and palpitations, SOB, chest pain, arm pain, neck pain, visual changes, headaches, or dizziness  Nurse does state upon exertion she does have a slight wheeze but upon rest it does resolve  I advised the nurse that I would forward an FYI but she should really reach out to [address] cardiologist for recommendations  She is calling them now as well

## 2023-03-09 NOTE — TELEPHONE ENCOUNTER
Patient states that the tylenol #3 helps while she is sleeping, but every time she rises she screams from pain  She said they also make her groggy  They do help her fall asleep fast  The cardiologist doubled her Tenormin

## 2023-03-13 NOTE — TELEPHONE ENCOUNTER
Patient states she is still having the same amount of pain  She is still wheezing  Dr Markell Cerna thought it may be from Covid  She also states she gets out of breath easily  She is trying to get Dr Lorri Pereira to see if she can get a MRI scheduled, but has not be successful

## 2023-03-14 ENCOUNTER — OFFICE VISIT (OUTPATIENT)
Dept: FAMILY MEDICINE CLINIC | Facility: CLINIC | Age: 78
End: 2023-03-14

## 2023-03-14 VITALS
TEMPERATURE: 98.2 F | OXYGEN SATURATION: 98 % | SYSTOLIC BLOOD PRESSURE: 130 MMHG | DIASTOLIC BLOOD PRESSURE: 82 MMHG | HEIGHT: 60 IN | HEART RATE: 84 BPM | BODY MASS INDEX: 29.45 KG/M2 | WEIGHT: 150 LBS

## 2023-03-14 DIAGNOSIS — R10.9 LEFT FLANK PAIN: ICD-10-CM

## 2023-03-14 DIAGNOSIS — R05.8 PRODUCTIVE COUGH: Primary | ICD-10-CM

## 2023-03-14 RX ORDER — AZITHROMYCIN 250 MG/1
TABLET, FILM COATED ORAL
Qty: 6 TABLET | Refills: 0 | Status: SHIPPED | OUTPATIENT
Start: 2023-03-14 | End: 2023-03-18

## 2023-03-14 NOTE — PROGRESS NOTES
Name: Dali Pham      : 1945      MRN: [id number]  Encounter Provider: Nori Mccartney MD  Encounter Date: 3/14/2023   Encounter department: [address] [place] [address] Road     1  Productive cough  Assessment & Plan:  Pt with previous hx of smoking - consider COPD exacerbation as cause  Pt reluctant to have more "testing" done  Start z-pack as directed  Increase albuterol to 2puff q4-6h  Recheck Friday if not improving - earlier if worse    Orders:  -     azithromycin (ZITHROMAX) 250 mg tablet; 2 tab today then 1 tab po qd x 4 d    2  Left flank pain  Assessment & Plan:  Slowly improving  Continue conservative care  Recheck 1-2w if not resolving             Subjective     Pt here for f/u several issues  - pt still notes some occasionally productive cough and feeling of SOB when she is in pain, or with prolonged walking (especially if she is walking quickly)  Pt with a prior hx of smoking, but quit several years ago  Pt denies fever/chills, CP, palpitations, or other associated symptoms  - back pain is slowly improving  Pt notes that she can et out of bed more comfortably  No new back symptoms  - pt denies any new GI or  complaints     Review of Systems   Constitutional: Negative  HENT: Negative  Eyes: Negative  Respiratory: Positive for cough and shortness of breath  Negative for wheezing  Cardiovascular: Negative  Gastrointestinal: Negative  Genitourinary: Negative  Musculoskeletal: Positive for arthralgias and back pain  Negative for gait problem, joint swelling and myalgias  Neurological: Negative  All other systems reviewed and are negative        Past Medical History:   Diagnosis Date   • Gastroenteritis      Past Surgical History:   Procedure Laterality Date   • ATRIAL CARDIAC PACEMAKER INSERTION       Family History   Problem Relation Age of Onset   • Diabetes Sister    • Dementia Sister      Social History     Socioeconomic History   • Marital status:      Spouse name: None   • Number of children: None   • Years of education: None   • Highest education level: None   Occupational History   • None   Tobacco Use   • Smoking status: Former   • Smokeless tobacco: Never   Substance and Sexual Activity   • Alcohol use: Not Currently   • Drug use: Never   • Sexual activity: None   Other Topics Concern   • None   Social History Narrative   • None     Social Determinants of Health     Financial Resource Strain: Low Risk    • Difficulty of Paying Living Expenses: Not very hard   Food Insecurity: Not on file   Transportation Needs: No Transportation Needs   • Lack of Transportation (Medical): No   • Lack of Transportation (Non-Medical):  No   Physical Activity: Not on file   Stress: Not on file   Social Connections: Not on file   Intimate Partner Violence: Not on file   Housing Stability: Not on file     Current Outpatient Medications on File Prior to Visit   Medication Sig   • acetaminophen-codeine (TYLENOL #3) 300-30 mg per tablet Take 1 tablet by mouth every 6 (six) hours as needed for moderate pain   • albuterol (Proventil HFA) 90 mcg/act inhaler Inhale 2 puffs every 6 (six) hours as needed for wheezing   • ascorbic acid (VITAMIN C) 500 mg tablet Take by mouth daily   • aspirin (ECOTRIN LOW STRENGTH) 81 mg EC tablet Take 81 mg by mouth daily   • atenolol (TENORMIN) 25 mg tablet Take 1 tablet (25 mg total) by mouth daily   • Blood Glucose Monitoring Suppl (Contour Next Monitor) w/Device KIT Use bid as directed   • chlordiazePOXIDE (LIBRIUM) 5 mg capsule Take 1 capsule (5 mg total) by mouth 2 (two) times a day   • clotrimazole-betamethasone (LOTRISONE) 1-0 05 % cream Apply topically 2 (two) times a day   • Droplet Pen Needles 32G X 4 MM MISC Inject under the skin in the morning   • ezetimibe (ZETIA) 10 mg tablet Take 10 mg by mouth daily   • gabapentin (NEURONTIN) 100 mg capsule Take 1 capsule (100 mg total) by mouth 3 (three) times a day • glucose blood (Contour Next Test) test strip Patient tests blood sugars twice daily   • imipramine (TOFRANIL) 25 mg tablet 1 tab in the morning and 2 tab qHS   • Insulin Glargine Solostar (Lantus SoloStar) 100 UNIT/ML SOPN Inject 0 4 mL (40 Units total) under the skin in the morning   • ketoconazole (NIZORAL) 2 % cream APPLY TOPICALLY TWICE DAILY UNTIL RASH CLEARS UNDER BREASTS   • Lidocaine 4 % PTCH Apply 1 patch topically in the morning   • losartan (COZAAR) 50 mg tablet Take 50 mg by mouth 2 (two) times a day   • meloxicam (Mobic) 7 5 mg tablet Take 1 tablet (7 5 mg total) by mouth daily   • methocarbamol (ROBAXIN) 750 mg tablet Take 1 tablet (750 mg total) by mouth 3 (three) times a day as needed for muscle spasms   • Microlet Lancets MISC Patient tests blood glucose twice daily   • pantoprazole (PROTONIX) 40 mg tablet take 1 tablet by mouth twice a day 30 TO 60 MINUTES BEFORE BREAKFAST AND DINNER   • polyethylene glycol (MIRALAX) 17 g packet Take 17 g by mouth daily   • pramipexole (MIRAPEX) 0 75 MG tablet Take 1 tablet (0 75 mg total) by mouth every evening   • Promethazine-DM (PHENERGAN-DM) 6 25-15 mg/5 mL oral syrup Take 5 mL by mouth 4 (four) times a day as needed for cough   • RA Aspirin EC 81 MG EC tablet take 1 tablet by mouth once daily   • repaglinide (PRANDIN) 0 5 mg tablet Take 1 tablet (0 5 mg total) by mouth 3 (three) times a day before meals   • triamcinolone (KENALOG) 0 1 % cream Apply topically 2 (two) times a day   • Vascepa 1 g CAPS Take 2 capsules by mouth 2 (two) times a day   • Vitamin E 400 units TABS Take 2 tablets by mouth daily   • [DISCONTINUED] hydrOXYzine HCL (ATARAX) 25 mg tablet Take 1 tablet (25 mg total) by mouth daily at bedtime     Allergies   Allergen Reactions   • Atorvastatin      Reaction Date: 23Dec2011;    Unknown reaction  LFT increased   • Clarithromycin      Reaction Date: 23Dec2011;   Patient does not recall reaction   • Hydrogen Peroxide Swelling     Reaction Date: [id number];   Swelling in the mouth   • Levofloxacin      Patient does not recall her reaction   • Other Swelling     Perioxol (mouth rinse)  Perioxol (mouth rinse)   • Penicillins Hives and Swelling   • Rosuvastatin      Reaction Date: 23Dec2011; Unknown reaction per patient  Increase LFT     Immunization History   Administered Date(s) Administered   • COVID-19 PFIZER VACCINE 0 3 ML IM 02/17/2021, 03/10/2021, 12/09/2021   • Tuberculin Skin Test-PPD Intradermal 04/14/2008       Objective     /82 (BP Location: Left arm, Patient Position: Sitting, Cuff Size: Adult)   Pulse 84   Temp 98 2 °F (36 8 °C)   Ht 5' (1 524 m)   Wt 68 kg (150 lb)   SpO2 98%   BMI 29 29 kg/m²     Physical Exam  Vitals reviewed  HENT:      Head: Normocephalic  Nose: Congestion (mild) present  Mouth/Throat:      Mouth: Mucous membranes are moist    Eyes:      Extraocular Movements: Extraocular movements intact  Conjunctiva/sclera: Conjunctivae normal       Pupils: Pupils are equal, round, and reactive to light  Cardiovascular:      Rate and Rhythm: Normal rate and regular rhythm  Pulmonary:      Breath sounds: No rales  Comments: Sl decreased BS throughout  No wheeze or rales appreciated  Musculoskeletal:         General: No tenderness  Cervical back: No tenderness  Right lower leg: No edema  Left lower leg: No edema  Lymphadenopathy:      Cervical: No cervical adenopathy  Skin:     General: Skin is warm  Capillary Refill: Capillary refill takes less than 2 seconds  Neurological:      Mental Status: She is alert         David Virgen MD

## 2023-03-15 DIAGNOSIS — F51.01 PRIMARY INSOMNIA: ICD-10-CM

## 2023-03-15 PROBLEM — M54.50 ACUTE LOW BACK PAIN: Status: RESOLVED | Noted: 2023-02-27 | Resolved: 2023-03-15

## 2023-03-15 RX ORDER — HYDROXYZINE HYDROCHLORIDE 25 MG/1
25 TABLET, FILM COATED ORAL
Qty: 30 TABLET | Refills: 0 | Status: SHIPPED | OUTPATIENT
Start: 2023-03-15

## 2023-03-15 NOTE — TELEPHONE ENCOUNTER
Arabella from [facility] called asking for clarification on one of patient's medications  While visiting with patient, Padilla Miranda states that patient told her that she is unaware if she is supposed to be taking Hydroxyzine  She states that she has been taking it, but does not remember discussing when to stop taking it  Please advise  Padilla Miranda is asking for a return phone call as well so she can go over with patient

## 2023-03-15 NOTE — ASSESSMENT & PLAN NOTE
Pt with previous hx of smoking - consider COPD exacerbation as cause  Pt reluctant to have more "testing" done  Start z-pack as directed  Increase albuterol to 2puff q4-6h    Recheck Friday if not improving - earlier if worse

## 2023-03-15 NOTE — TELEPHONE ENCOUNTER
[employer] and gave her message, then called patient to clarify with her how to take her medication       She states she was confused because the bottle that the Hydroxyzine was in said the name of her other medication, Pramipexole

## 2023-03-20 ENCOUNTER — OFFICE VISIT (OUTPATIENT)
Dept: FAMILY MEDICINE CLINIC | Facility: CLINIC | Age: 78
End: 2023-03-20

## 2023-03-20 VITALS
HEIGHT: 60 IN | HEART RATE: 78 BPM | TEMPERATURE: 97.3 F | DIASTOLIC BLOOD PRESSURE: 66 MMHG | WEIGHT: 152 LBS | OXYGEN SATURATION: 95 % | SYSTOLIC BLOOD PRESSURE: 130 MMHG | BODY MASS INDEX: 29.84 KG/M2

## 2023-03-20 DIAGNOSIS — R06.2 WHEEZE: ICD-10-CM

## 2023-03-20 DIAGNOSIS — J44.1 COPD EXACERBATION (HCC): ICD-10-CM

## 2023-03-20 DIAGNOSIS — R10.9 LEFT FLANK PAIN: ICD-10-CM

## 2023-03-20 DIAGNOSIS — M54.50 CHRONIC LEFT-SIDED LOW BACK PAIN WITHOUT SCIATICA: ICD-10-CM

## 2023-03-20 DIAGNOSIS — M54.9 MID BACK PAIN: ICD-10-CM

## 2023-03-20 DIAGNOSIS — R06.00 DYSPNEA, UNSPECIFIED TYPE: Primary | ICD-10-CM

## 2023-03-20 DIAGNOSIS — G89.29 CHRONIC LEFT-SIDED LOW BACK PAIN WITHOUT SCIATICA: ICD-10-CM

## 2023-03-20 DIAGNOSIS — R09.89 RALES: ICD-10-CM

## 2023-03-20 RX ORDER — ACETAMINOPHEN AND CODEINE PHOSPHATE 300; 30 MG/1; MG/1
1 TABLET ORAL EVERY 6 HOURS PRN
Qty: 30 TABLET | Refills: 0 | Status: SHIPPED | OUTPATIENT
Start: 2023-03-20

## 2023-03-20 RX ORDER — GABAPENTIN 100 MG/1
100 CAPSULE ORAL 3 TIMES DAILY
Qty: 90 CAPSULE | Refills: 3 | Status: SHIPPED | OUTPATIENT
Start: 2023-03-20

## 2023-03-20 RX ORDER — LIDOCAINE 4 G/G
1 PATCH TOPICAL DAILY
Qty: 30 PATCH | Refills: 0 | Status: SHIPPED | OUTPATIENT
Start: 2023-03-20

## 2023-03-20 NOTE — PROGRESS NOTES
Name: Cindy Butcher      : 1945      MRN: [id number]  Encounter Provider: Ana Bateman MD  Encounter Date: 3/20/2023   Encounter department: [address] [place] [address]     1  Dyspnea, unspecified type  -     POCT spirometry  -     XR chest pa & lateral; Future; Expected date: 2023  -     CBC and differential; Future; Expected date: 2023  -     NT-BNP PRO; Future; Expected date: 2023  -     Basic metabolic panel; Future; Expected date: 2023  -     fluticasone-umeclidinium-vilanterol 193-44 5-35 mcg/actuation AEPB inhaler; Inhale 1 puff daily Rinse mouth after use  2  Rales  -     XR chest pa & lateral; Future; Expected date: 2023  -     CBC and differential; Future; Expected date: 2023  -     NT-BNP PRO; Future; Expected date: 2023  -     Basic metabolic panel; Future; Expected date: 2023  -     fluticasone-umeclidinium-vilanterol 675-11 3-78 mcg/actuation AEPB inhaler; Inhale 1 puff daily Rinse mouth after use  3  Wheeze  -     XR chest pa & lateral; Future; Expected date: 2023  -     CBC and differential; Future; Expected date: 2023  -     NT-BNP PRO; Future; Expected date: 2023  -     Basic metabolic panel; Future; Expected date: 2023    4  COPD exacerbation ([facility] [address] )  -     fluticasone-umeclidinium-vilanterol 747-39 2-99 mcg/actuation AEPB inhaler; Inhale 1 puff daily Rinse mouth after use  Discussion:  I reviewed with pt  Exam with sl low O2 sat, dry sounding rales and occasional wheeze  Morning SOB/wheeze would suggest inflammatory cause  Pt appears euvolemic and does not have increased weight or increased edema, but does have a brenda, possibly suggesting Cardiac cause  REC: start Trelegy, 1 inhalation qd  Check CXR and labs including BNP  Continue all other meds and inhalers  Pt to call tomorrow with follow up  Consider adding pred if BG and BNP are acceptable   Pt to go to ED if symptoms worsen  Pt to call for problems or concerns in the interim       Subjective     71-year-old female with recent worsening shortness of breath here for exacerbation of the last 2 nights  Over the last 2 nights, patient's woke up at 4:00 in the morning with feelings of shortness of breath  Symptoms will last approximately 2 hours and slowly improve as the morning goes on  She has an albuterol inhaler at home but does not feel that it is helping very much  Patient was started on a Zithromax course last week (last Tuesday) which she finished on Saturday  She denies any fever or chills  Pt denies CP, palp, lightheadedness or other CV symptoms associated with these symptoms  Review of Systems   Constitutional: Positive for fatigue  Negative for activity change, appetite change, chills and fever  HENT: Negative  Eyes: Negative  Respiratory: Positive for cough, shortness of breath and wheezing  Cardiovascular: Negative  Gastrointestinal: Negative  Musculoskeletal: Negative  Past Medical History:   Diagnosis Date   • Gastroenteritis      Past Surgical History:   Procedure Laterality Date   • ATRIAL CARDIAC PACEMAKER INSERTION       Family History   Problem Relation Age of Onset   • Diabetes Sister    • Dementia Sister      Social History     Socioeconomic History   • Marital status:       Spouse name: None   • Number of children: None   • Years of education: None   • Highest education level: None   Occupational History   • None   Tobacco Use   • Smoking status: Former   • Smokeless tobacco: Never   Substance and Sexual Activity   • Alcohol use: Not Currently   • Drug use: Never   • Sexual activity: None   Other Topics Concern   • None   Social History Narrative   • None     Social Determinants of Health     Financial Resource Strain: Low Risk    • Difficulty of Paying Living Expenses: Not very hard   Food Insecurity: Not on file   Transportation Needs: No Transportation Needs   • Lack of Transportation (Medical): No   • Lack of Transportation (Non-Medical):  No   Physical Activity: Not on file   Stress: Not on file   Social Connections: Not on file   Intimate Partner Violence: Not on file   Housing Stability: Not on file     Current Outpatient Medications on File Prior to Visit   Medication Sig   • albuterol (Proventil HFA) 90 mcg/act inhaler Inhale 2 puffs every 6 (six) hours as needed for wheezing   • ascorbic acid (VITAMIN C) 500 mg tablet Take by mouth daily   • aspirin (ECOTRIN LOW STRENGTH) 81 mg EC tablet Take 81 mg by mouth daily   • atenolol (TENORMIN) 25 mg tablet Take 1 tablet (25 mg total) by mouth daily   • Blood Glucose Monitoring Suppl (Contour Next Monitor) w/Device KIT Use bid as directed   • chlordiazePOXIDE (LIBRIUM) 5 mg capsule Take 1 capsule (5 mg total) by mouth 2 (two) times a day   • clotrimazole-betamethasone (LOTRISONE) 1-0 05 % cream Apply topically 2 (two) times a day   • Droplet Pen Needles 32G X 4 MM MISC Inject under the skin in the morning   • ezetimibe (ZETIA) 10 mg tablet Take 10 mg by mouth daily   • glucose blood (Contour Next Test) test strip Patient tests blood sugars twice daily   • hydrOXYzine HCL (ATARAX) 25 mg tablet Take 1 tablet (25 mg total) by mouth daily at bedtime   • imipramine (TOFRANIL) 25 mg tablet 1 tab in the morning and 2 tab qHS   • Insulin Glargine Solostar (Lantus SoloStar) 100 UNIT/ML SOPN Inject 0 4 mL (40 Units total) under the skin in the morning   • ketoconazole (NIZORAL) 2 % cream APPLY TOPICALLY TWICE DAILY UNTIL RASH CLEARS UNDER BREASTS   • losartan (COZAAR) 50 mg tablet Take 50 mg by mouth 2 (two) times a day   • meloxicam (Mobic) 7 5 mg tablet Take 1 tablet (7 5 mg total) by mouth daily   • methocarbamol (ROBAXIN) 750 mg tablet Take 1 tablet (750 mg total) by mouth 3 (three) times a day as needed for muscle spasms   • Microlet Lancets MISC Patient tests blood glucose twice daily   • pantoprazole (PROTONIX) 40 mg tablet take 1 tablet by mouth twice a day 30 TO 60 MINUTES BEFORE BREAKFAST AND DINNER   • polyethylene glycol (MIRALAX) 17 g packet Take 17 g by mouth daily   • pramipexole (MIRAPEX) 0 75 MG tablet Take 1 tablet (0 75 mg total) by mouth every evening   • Promethazine-DM (PHENERGAN-DM) 6 25-15 mg/5 mL oral syrup Take 5 mL by mouth 4 (four) times a day as needed for cough   • RA Aspirin EC 81 MG EC tablet take 1 tablet by mouth once daily   • repaglinide (PRANDIN) 0 5 mg tablet Take 1 tablet (0 5 mg total) by mouth 3 (three) times a day before meals   • triamcinolone (KENALOG) 0 1 % cream Apply topically 2 (two) times a day   • Vascepa 1 g CAPS Take 2 capsules by mouth 2 (two) times a day   • Vitamin E 400 units TABS Take 2 tablets by mouth daily   • acetaminophen-codeine (TYLENOL #3) 300-30 mg per tablet Take 1 tablet by mouth every 6 (six) hours as needed for moderate pain   • gabapentin (NEURONTIN) 100 mg capsule Take 1 capsule (100 mg total) by mouth 3 (three) times a day   • Lidocaine 4 % PTCH Apply 1 patch topically in the morning     Allergies   Allergen Reactions   • Atorvastatin      Reaction Date: 23Dec2011; Unknown reaction  LFT increased   • Clarithromycin      Reaction Date: 23Dec2011;   Patient does not recall reaction   • Hydrogen Peroxide Swelling     Reaction Date: 23Dec2011;   Swelling in the mouth   • Levofloxacin      Patient does not recall her reaction   • Other Swelling     Perioxol (mouth rinse)  Perioxol (mouth rinse)   • Penicillins Hives and Swelling   • Rosuvastatin      Reaction Date: 23Dec2011;    Unknown reaction per patient  Increase LFT     Immunization History   Administered Date(s) Administered   • COVID-19 PFIZER VACCINE 0 3 ML IM 02/17/2021, 03/10/2021, 12/09/2021   • Tuberculin Skin Test-PPD Intradermal 04/14/2008       Objective     /66 (BP Location: Left arm, Patient Position: Sitting, Cuff Size: Adult)   Pulse 78   Temp (!) 97 3 °F (36 3 °C)   Ht 5' (1 524 m)   Wt 68 9 kg (152 lb)   SpO2 95%   BMI 29 69 kg/m²     Physical Exam  Vitals and nursing note reviewed  HENT:      Head: Normocephalic  Right Ear: Tympanic membrane, ear canal and external ear normal       Left Ear: Tympanic membrane, ear canal and external ear normal       Nose: Nose normal       Mouth/Throat:      Mouth: Mucous membranes are moist    Eyes:      Conjunctiva/sclera: Conjunctivae normal       Pupils: Pupils are equal, round, and reactive to light  Neck:      Comments: No JVD appreciated  Cardiovascular:      Rate and Rhythm: Normal rate and regular rhythm  Heart sounds:     Gallop present  Pulmonary:      Breath sounds: Wheezing (rare) and rales (dry sounding bases extending 1/3 up bilat) present  No rhonchi  Musculoskeletal:      Cervical back: No tenderness  Right lower leg: No edema  Left lower leg: No edema  Lymphadenopathy:      Cervical: No cervical adenopathy  Skin:     General: Skin is warm  Capillary Refill: Capillary refill takes less than 2 seconds  Neurological:      Mental Status: She is alert and oriented to person, place, and time         Main Alexandra MD

## 2023-03-20 NOTE — TELEPHONE ENCOUNTER
1  [id number] 03/03/2023 03/03/2023 Acetaminophen 300 Mg / Codeine Phosphate 30 Mg Oral Tablet (Tablet)  30 0 7 30 0 MG/300 0 MG 19 29 DANNY CHOWDARY POGODZINSKI  [facility]  [facility] 0 / 0 [place]    1  [id number] 02/24/2023 02/24/2023 chlordiazePOXIDE HCL (Capsule)  60 0 30 5 MG NA DANNY CHOWDARY POGODZINSKI  [facility]  [place] 0 / 0 [place]    1  [id number] 01/25/2023 01/25/2023 chlordiazePOXIDE HCL (Capsule)  60 0 30 5 MG NA DANNY CHOWDARY POGODZINSKI  [facility]  Medicare 0 / 0 [place]    1  [id number] 12/27/2022 12/23/2022 chlordiazePOXIDE HCL (Capsule)  60 0 30 5 MG NA SARIKA REYES  [facility]  [place] 0 / 0 [place]    1  [id number] 11/29/2022 11/29/2022 chlordiazePOXIDE HCL (Capsule)  60 0 30 5 MG NA DANNY CHOWDARY POGODZINSKI  [facility]  Med[place] 0 / 0 [place]    1  [id number] 10/28/2022  10/28/2022 chlordiazePOXIDE HCL (Capsule)  60 0 30 5 MG NA DANNY CHOWDARY POGODZINSKI  [facility]  Medic[place] 0 / 0 [place]    1  [id number] 10/11/2022  10/11/2022 LORazepam 05 MG TABLET (non-liquid)  4 0 2 0 5 MG NA JORDAN VILLALOBOS  [facility]  Med[place] 0 / 0 [place]    1  [id number] 09/29/2022 09/26/2022 chlordiazePOXIDE HCL (Capsule)  60 0 30 5 MG NA DANNY CHOWDARY POGODZINSKI  [facility]  Medicare 0 / 0 [place]    1  [id number] 08/31/2022 08/31/2022 chlordiazePOXIDE HCL (Capsule)  60 0 30 5 MG NA DANNY CHOWDARY POGODZINSKI  [facility]  Medicare 0 / 0 [place]    1  [id number] 08/02/2022 08/01/2022 chlordiazePOXIDE HCL (Capsule)  60 0 30 5 MG NA DANNY CHOWDARY POGODZINSKI  [facility]    [place] 0 / 0 [place]

## 2023-03-22 ENCOUNTER — TELEPHONE (OUTPATIENT)
Dept: FAMILY MEDICINE CLINIC | Facility: CLINIC | Age: 78
End: 2023-03-22

## 2023-03-22 NOTE — TELEPHONE ENCOUNTER
Pt called stating she received her new inhaler yesterday and started using it along with her old inhaler  States she woke up 3-4 times during the night to use the bathroom but the wheezing was better  Did want you to know that even with her insurance she still had to pay out of pocket, $102

## 2023-03-22 NOTE — TELEPHONE ENCOUNTER
Patient called and wanted to also mention per earlier phone call) the nurse checked her out and heard wheezing only on lower right back and nothing on the left

## 2023-03-24 DIAGNOSIS — R06.2 WHEEZE: Primary | ICD-10-CM

## 2023-03-24 DIAGNOSIS — R06.00 DYSPNEA, UNSPECIFIED TYPE: ICD-10-CM

## 2023-03-27 DIAGNOSIS — F41.9 ANXIETY: ICD-10-CM

## 2023-03-27 RX ORDER — CHLORDIAZEPOXIDE HYDROCHLORIDE 5 MG/1
5 CAPSULE, GELATIN COATED ORAL 2 TIMES DAILY
Qty: 60 CAPSULE | Refills: 0 | Status: SHIPPED | OUTPATIENT
Start: 2023-03-27

## 2023-03-27 NOTE — TELEPHONE ENCOUNTER
[id number] 03/03/2023 03/03/2023 Acetaminophen 300 Mg / Codeine Phosphate 30 Mg Oral Tablet (Tablet)  30 0 7 30 0 MG/300 0 MG 19 29 DANNY CHOWDARY POGODZINSKI  [facility]  [facility] 0 / 0 [place]     1  [id number] 02/24/2023 02/24/2023 chlordiazePOXIDE HCL (Capsule)  60 0 30 5 MG NA DANNY CHOWDARY POGODZINSKI  [facility]  [place]ic[place] 0 / 0 [place]    1  [id number] 01/25/2023 01/25/2023 chlordiazePOXIDE HCL (Capsule)  60 0 30 5 MG NA DANNY CHOWDARY POGODZINSKI  [facility]  Medicare 0 / 0 [place]    1  [id number] 12/27/2022 12/23/2022 chlordiazePOXIDE HCL (Capsule)  60 0 30 5 MG NA SARIKA REYES  [facility]  Medicare 0 / 0 [place]    1  [id number] 11/29/2022 11/29/2022 chlordiazePOXIDE HCL (Capsule)  60 0 30 5 MG NA DANNY CHOWDARY POGODZINSKI  [facility]    Med[place]are 0 / 0 [place]

## 2023-03-30 PROBLEM — R05.8 PRODUCTIVE COUGH: Status: RESOLVED | Noted: 2023-01-29 | Resolved: 2023-03-30

## 2023-04-04 ENCOUNTER — TELEPHONE (OUTPATIENT)
Dept: FAMILY MEDICINE CLINIC | Facility: CLINIC | Age: 78
End: 2023-04-04

## 2023-04-04 NOTE — TELEPHONE ENCOUNTER
Called and initiated appeal for patient's Trelegy  The rep asked me to send over all office notes, labs, imaging, and hospital stay  If the fax doesn't go through we need to refax  All paperwork in at my desk  Faxed info to [phone]  Ref  Number is [id number]  Hold for response

## 2023-04-10 NOTE — TELEPHONE ENCOUNTER
Hold for updated outcome, did submit an appeal on 04/07/2023 Writer reviewed discharge paperwork with patient and daughter. Pt taken out to main entrance in stable condition in wheelchair.

## 2023-04-13 NOTE — TELEPHONE ENCOUNTER
Per automated system, because there is no equivalent branded generic they are not required to authorize this medication

## 2023-04-13 NOTE — TELEPHONE ENCOUNTER
Left a detailed message with phone number for program  Also advised of denial of Trelegy with her rx plan

## 2023-04-14 NOTE — TELEPHONE ENCOUNTER
Pt would like an office note with the medications she is taking to be sent to her home  Pt is aware to reach out to the pharmacy for the documentation on how much she spends on Trelegy and her other medications

## 2023-04-14 NOTE — TELEPHONE ENCOUNTER
Patient called and has contacted the assistance program  Patient states they are mailing her an application to complete  In addition they are requesting that PCP send her documentation on how much she spends on Trelegy and her her other medication  Patient is requesting this be mailed to her home - she will need to include this with her application once it is submitted

## 2023-04-21 DIAGNOSIS — R09.89 RALES: ICD-10-CM

## 2023-04-21 DIAGNOSIS — J44.1 COPD EXACERBATION (HCC): ICD-10-CM

## 2023-04-21 DIAGNOSIS — R06.00 DYSPNEA, UNSPECIFIED TYPE: ICD-10-CM

## 2023-04-24 ENCOUNTER — TELEPHONE (OUTPATIENT)
Dept: FAMILY MEDICINE CLINIC | Facility: CLINIC | Age: 78
End: 2023-04-24

## 2023-04-24 DIAGNOSIS — R09.89 RALES: ICD-10-CM

## 2023-04-24 DIAGNOSIS — R06.00 DYSPNEA, UNSPECIFIED TYPE: ICD-10-CM

## 2023-04-24 DIAGNOSIS — J44.1 COPD EXACERBATION (HCC): ICD-10-CM

## 2023-04-24 NOTE — TELEPHONE ENCOUNTER
Pt is asking for a new script be sent in for Trelegy ellipta  Pt states it really helps her and she's out

## 2023-05-01 DIAGNOSIS — F41.9 ANXIETY: ICD-10-CM

## 2023-05-01 NOTE — TELEPHONE ENCOUNTER
[id number] 03/27/2023 03/27/2023 chlordiazePOXIDE HCL (Capsule)  60 0 30 5 MG NA DANNY CHOWDARY POGODZINSKI  [facility]  Medicare 0 / 0 [place]    1  [id number] 03/03/2023 03/03/2023 Acetaminophen 300 Mg / Codeine Phosphate 30 Mg Oral Tablet (Tablet)  30 0 7 30 0 MG/300 0 MG 19 29 DANNY CHOWDARY POGODZINSKI  [facility]  [facility] 0 / 0 [address]    1  [id number] 02/24/2023 02/24/2023 chlordiazePOXIDE HCL (Capsule)  60 0 30 5 MG NA DANNY CHOWDARY POGODZINSKI  [facility]  Medicare 0 / 0 [place]    1  [id number] 01/25/2023 01/25/2023 chlordiazePOXIDE HCL (Capsule)  60 0 30 5 MG NA DANNY CHOWDARY POGODZINSKI  [facility]  Medicare 0 / 0 [place]    1  [id number] 12/27/2022 12/23/2022 chlordiazePOXIDE HCL (Capsule)  60 0 30 5 MG NA SARIKA REYES  [facility]  Medicare 0 / 0 [place]    1  [id number] 11/29/2022 11/29/2022 chlordiazePOXIDE HCL (Capsule)  60 0 30 5 MG NA DANNY CHOWDARY POGODZINSKI  [facility]  Medicare 0 / 0 [place]    1  [id number] 10/28/2022  10/28/2022 chlordiazePOXIDE HCL (Capsule)  60 0 30 5 MG DANNY NARANJO POGODZINSKI  [facility]  Medicare 0 / 0 [place]    1  [id number] 10/11/2022  10/11/2022 LORazepam 05 MG TABLET (non-liquid)  4 0 2 0 5 MG NA JORDAN VILLALOBOS  [facility]    Medicare

## 2023-05-02 RX ORDER — CHLORDIAZEPOXIDE HYDROCHLORIDE 5 MG/1
5 CAPSULE, GELATIN COATED ORAL 2 TIMES DAILY
Qty: 60 CAPSULE | Refills: 0 | Status: SHIPPED | OUTPATIENT
Start: 2023-05-02

## 2023-05-04 ENCOUNTER — TELEPHONE (OUTPATIENT)
Dept: FAMILY MEDICINE CLINIC | Facility: CLINIC | Age: 78
End: 2023-05-04

## 2023-05-04 NOTE — TELEPHONE ENCOUNTER
Called patient and advised  She denies new meds or other symptoms  She did say you increased he imipramine recently to 1 AM and 2 HS  Per my discussion with you, this could be the cause  Patient can try going back to original dosing and see if it improves  Called patient and advised  Also, she is still taking Gabapentin 100 mg and methocarbamol 750 mg  She doesn't feel like she is in pain anymore and wants to know if she can stop these?

## 2023-05-04 NOTE — TELEPHONE ENCOUNTER
Pt called stating lately she's been sweating a lot on her forehead and the back of her head  States she's not doing anything, either sitting playing cards or just laying on the couch  Wondering if this is being caused by one of her medications?

## 2023-05-11 ENCOUNTER — TELEPHONE (OUTPATIENT)
Dept: FAMILY MEDICINE CLINIC | Facility: CLINIC | Age: 78
End: 2023-05-11

## 2023-05-11 NOTE — TELEPHONE ENCOUNTER
Pt called asking for a refill on her Pantoprazole 40mg be sent to the [facility] in [place]  Pt also states the Atarax isn't helping anymore  Wakes up every night between  2-3 a m  and can't go back to sleep

## 2023-05-12 DIAGNOSIS — R13.10 DYSPHAGIA, UNSPECIFIED TYPE: Primary | ICD-10-CM

## 2023-05-12 RX ORDER — PANTOPRAZOLE SODIUM 40 MG/1
40 TABLET, DELAYED RELEASE ORAL DAILY
Qty: 30 TABLET | Refills: 3 | Status: SHIPPED | OUTPATIENT
Start: 2023-05-12 | End: 2023-09-22

## 2023-05-15 NOTE — TELEPHONE ENCOUNTER
Pt states taking 2 tabs did help her sleep  She has been staying up later and wakes up later  She notes a lot of improvement but states she gets up to urinate 3-4 times a night  Now that she takes 2 Atarax she falls back asleep easily but the nocturia is starting to really concern her

## 2023-05-16 DIAGNOSIS — R35.0 URINARY FREQUENCY: Primary | ICD-10-CM

## 2023-05-17 DIAGNOSIS — F51.01 PRIMARY INSOMNIA: ICD-10-CM

## 2023-05-17 RX ORDER — HYDROXYZINE HYDROCHLORIDE 25 MG/1
TABLET, FILM COATED ORAL
Qty: 60 TABLET | Refills: 3 | Status: SHIPPED | OUTPATIENT
Start: 2023-05-17 | End: 2023-08-31

## 2023-05-17 NOTE — TELEPHONE ENCOUNTER
Pt states she is drinking a lot more water during the day, at least 5 bottles of water (unknown what the size is of water bottle)    Pt is asking for a refill of her hydroxyzine 25mg tablet  She is now taking 2 a day

## 2023-05-28 DIAGNOSIS — E11.9 TYPE 2 DIABETES MELLITUS WITHOUT COMPLICATION, WITHOUT LONG-TERM CURRENT USE OF INSULIN (HCC): ICD-10-CM

## 2023-05-30 RX ORDER — REPAGLINIDE 0.5 MG/1
TABLET ORAL
Qty: 90 TABLET | Refills: 1 | Status: SHIPPED | OUTPATIENT
Start: 2023-05-30

## 2023-06-01 DIAGNOSIS — F41.9 ANXIETY: ICD-10-CM

## 2023-06-01 RX ORDER — CHLORDIAZEPOXIDE HYDROCHLORIDE 5 MG/1
5 CAPSULE, GELATIN COATED ORAL 2 TIMES DAILY
Qty: 60 CAPSULE | Refills: 0 | Status: SHIPPED | OUTPATIENT
Start: 2023-06-01

## 2023-06-01 NOTE — TELEPHONE ENCOUNTER
1 [id number] 05/02/2023 05/02/2023 chlordiazePOXIDE HCL (Capsule) 60 0 30 5 MG NA DANNY CHOWDARY, YASMINE [facility], [facility]  Medicare 0 / 0 [place]    1 [id number] 03/27/2023 03/27/2023 chlordiazePOXIDE HCL (Capsule) 60 0 30 5 MG NA DANNY CHOWDARY POGODZINSKI [facility], [facility]   Medicare 0 / 0 [place]    1 [id number] 03/03/2023 03/03/2023 Acetaminophen 300 Mg / Codeine Phosphate 30 Mg Oral Tablet (Tablet) 30 0 7 30 0 MG/300 0 MG 19 29 DANNY CHOWDARY, YASMINE

## 2023-06-08 ENCOUNTER — TELEPHONE (OUTPATIENT)
Dept: FAMILY MEDICINE CLINIC | Facility: CLINIC | Age: 78
End: 2023-06-08

## 2023-06-08 NOTE — TELEPHONE ENCOUNTER
Patient states she is having a lot of pain in her legs and ankles    She said she went to see a pulmonologist yesterday and he suggested testing in her legs to check for blockages     She said you had ordered that for her already a few months ago    She is asking if you think she should have it done again     Please advise

## 2023-06-08 NOTE — TELEPHONE ENCOUNTER
Pt called vascular who is aware of the pain in her legs  Vascular advises that pt takes tylenol or advil for her pain  Per pt, tylenol does not work for her and is asking if you are ok with her taking advil?

## 2023-06-08 NOTE — TELEPHONE ENCOUNTER
Pt does have a vascular surgeon and she will call him  Pt also states she's back to not sleeping again  Wakes up around 2 a m  and can't go back to sleep

## 2023-06-09 ENCOUNTER — TELEPHONE (OUTPATIENT)
Dept: FAMILY MEDICINE CLINIC | Facility: CLINIC | Age: 78
End: 2023-06-09

## 2023-06-09 NOTE — TELEPHONE ENCOUNTER
Pt left message via [facility] asking for a call about an appt she has next week  Voicemail was very hard to hear, it sounded like she could not make that appt

## 2023-06-12 DIAGNOSIS — E11.9 TYPE 2 DIABETES MELLITUS WITHOUT COMPLICATION, WITHOUT LONG-TERM CURRENT USE OF INSULIN (HCC): ICD-10-CM

## 2023-06-12 RX ORDER — REPAGLINIDE 0.5 MG/1
0.5 TABLET ORAL
Qty: 90 TABLET | Refills: 1 | Status: SHIPPED | OUTPATIENT
Start: 2023-06-12

## 2023-06-13 ENCOUNTER — OFFICE VISIT (OUTPATIENT)
Dept: FAMILY MEDICINE CLINIC | Facility: CLINIC | Age: 78
End: 2023-06-13
Payer: MEDICARE

## 2023-06-13 VITALS
BODY MASS INDEX: 30.43 KG/M2 | HEART RATE: 80 BPM | DIASTOLIC BLOOD PRESSURE: 80 MMHG | OXYGEN SATURATION: 98 % | SYSTOLIC BLOOD PRESSURE: 124 MMHG | TEMPERATURE: 98 F | HEIGHT: 60 IN | WEIGHT: 155 LBS

## 2023-06-13 DIAGNOSIS — M81.0 AGE-RELATED OSTEOPOROSIS WITHOUT CURRENT PATHOLOGICAL FRACTURE: ICD-10-CM

## 2023-06-13 DIAGNOSIS — R63.5 WEIGHT GAIN: Primary | ICD-10-CM

## 2023-06-13 DIAGNOSIS — S22.080D COMPRESSION FRACTURE OF T11 VERTEBRA WITH ROUTINE HEALING, SUBSEQUENT ENCOUNTER: ICD-10-CM

## 2023-06-13 DIAGNOSIS — E11.65 TYPE 2 DIABETES MELLITUS WITH HYPERGLYCEMIA, WITH LONG-TERM CURRENT USE OF INSULIN (HCC): ICD-10-CM

## 2023-06-13 DIAGNOSIS — E11.9 TYPE 2 DIABETES MELLITUS WITHOUT COMPLICATION, WITHOUT LONG-TERM CURRENT USE OF INSULIN (HCC): ICD-10-CM

## 2023-06-13 DIAGNOSIS — I73.9 PERIPHERAL ARTERIAL DISEASE (HCC): ICD-10-CM

## 2023-06-13 DIAGNOSIS — I10 PRIMARY HYPERTENSION: ICD-10-CM

## 2023-06-13 DIAGNOSIS — Z79.4 TYPE 2 DIABETES MELLITUS WITH HYPERGLYCEMIA, WITH LONG-TERM CURRENT USE OF INSULIN (HCC): ICD-10-CM

## 2023-06-13 DIAGNOSIS — I42.9 CARDIOMYOPATHY, UNSPECIFIED TYPE (HCC): ICD-10-CM

## 2023-06-13 PROCEDURE — 99214 OFFICE O/P EST MOD 30 MIN: CPT | Performed by: FAMILY MEDICINE

## 2023-06-13 NOTE — PROGRESS NOTES
"Name: Pablo Gaona      : 1945      MRN: [id number]  Encounter Provider: Aggie Germain MD  Encounter Date: 2023   Encounter department: [address]     1  Weight gain  Assessment & Plan:  11lb in 6m  Now with a ?moon like facies? Will check TSH and am cortisol  Monitor diet  Recheck 2m    Orders:  -     TSH, 3rd generation with Free T4 reflex; Future  -     Cortisol Level, AM Specimen; Future    2  Cardiomyopathy, unspecified type [facility])  Assessment & Plan:  Echo last July revealed worsening EF - \"Systolic function is moderately decreased with an ejection fraction of 35-40%  There is severe hypokinesis of the   anteroseptal wall from mid-wall to apex and entire apex\"  Increased leg swelling or JVD not appreciated, however  Continue present care  F/u with Cardio  Recheck 3-6m  3  Type 2 diabetes mellitus with hyperglycemia, with long-term current use of insulin [facility])  Assessment & Plan:    Lab Results   Component Value Date    HGBA1C 7 8 (H) 2023   I reviewed with pt  BGs have been elevated, so I would avoid using prednisone for back pain  Continue present care  Recheck 6m       4  Primary hypertension  Assessment & Plan:  Well controlled  Cont present treatment  Monitor labs  Recheck 6m        5  Type 2 diabetes mellitus without complication, without long-term current use of insulin ([facility])  -     Comprehensive metabolic panel; Future; Expected date: 2023  -     CBC and differential; Future; Expected date: 2023  -     Lipid Panel with Direct LDL reflex; Future; Expected date: 2023  -     Hemoglobin A1C; Future; Expected date: 2023    6  Compression fracture of T11 vertebra with routine healing, subsequent encounter  Assessment & Plan:  ?as a cause of her pain? Physicians at [address] [place] [address] [address] recommended PT  Would continue to monitor   Recheck 6m      7  Age-related osteoporosis without current pathological " "fracture  Assessment & Plan:  Recent DEXA demonstrated T scores of -3 5 at the lumbar spine, -2 8 at the left hip, and -4 1 of the left femoral neck signifying severe osteoporosis  Pt was seen by Rheum at [facility] and started on Evenity  She will take this for 12m then transition onto Prolia  Recheck 3-6m      8  Peripheral arterial disease ([address] )  Assessment & Plan:  Seems to be contributing to her R leg pain, though back may be playing a role as well  Continue present care  Encouraged ambulation  Consider PT  Recheck 2m             Subjective     Pt here for several concerns  - pt with low back pain, worse with standing x 2m  She denies any trauma  Pt does have R leg pain with ambulation, but this was occurring prior to back pain onset (?R leg claudication?)  No change in bowel or bladder  - pt witj R leg pain with ambulation as above - ?slowly worsening? Arterial duplex in Feb revealed \"Moderate to severe peripheral arterial disease of bilateral lower extremities based on CHRISTIN at rest   Right toe pressure is above 90%, and left toe pressure is above 90% probability threshold for ulcer healing  \"  ?pt was seen by vascular surgery at [facility] but does not have a f/u duplex or appt with vasc surgery until November  Pt does not want to use a [facility] facility or physician due to $$ (insurance prefers [facility])  - pt with leg dermatitis  She was seen by derm at [facility] and told \"it is age related\" and \"not to worry about it\"  No pain or pruritis  - pt concerned with her weight gain  She states that she has been compliant with her diet, and medications  - pt denies CP, palpitations, lightheadedness or other CV symptoms with or without exertion  - no abd complaints    Review of Systems   Constitutional: Positive for unexpected weight change (11lb since December)  HENT: Negative  Eyes: Negative  Respiratory: Negative  Cardiovascular: Negative  Negative for leg swelling  Gastrointestinal: Negative      Genitourinary: " Negative  Musculoskeletal: Positive for arthralgias, back pain, gait problem (when R leg pain hurts) and myalgias  Skin: Negative  Neurological: Positive for weakness (mild, leg?)  Negative for dizziness, light-headedness, numbness and headaches  All other systems reviewed and are negative  Past Medical History:   Diagnosis Date   • Gastroenteritis      Past Surgical History:   Procedure Laterality Date   • ATRIAL CARDIAC PACEMAKER INSERTION       Family History   Problem Relation Age of Onset   • Diabetes Sister    • Dementia Sister      Social History     Socioeconomic History   • Marital status:      Spouse name: None   • Number of children: None   • Years of education: None   • Highest education level: None   Occupational History   • None   Tobacco Use   • Smoking status: Former   • Smokeless tobacco: Never   Substance and Sexual Activity   • Alcohol use: Not Currently   • Drug use: Never   • Sexual activity: None   Other Topics Concern   • None   Social History Narrative   • None     Social Determinants of Health     Financial Resource Strain: Low Risk  (8/11/2022)    Overall Financial Resource Strain (CARDIA)    • Difficulty of Paying Living Expenses: Not very hard   Food Insecurity: Not on file   Transportation Needs: No Transportation Needs (8/11/2022)    PRAPARE - Transportation    • Lack of Transportation (Medical): No    • Lack of Transportation (Non-Medical):  No   Physical Activity: Not on file   Stress: Not on file   Social Connections: Not on file   Intimate Partner Violence: Not on file   Housing Stability: Not on file     Current Outpatient Medications on File Prior to Visit   Medication Sig   • acetaminophen-codeine (TYLENOL #3) 300-30 mg per tablet Take 1 tablet by mouth every 6 (six) hours as needed for moderate pain   • albuterol (Proventil HFA) 90 mcg/act inhaler Inhale 2 puffs every 6 (six) hours as needed for wheezing   • ascorbic acid (VITAMIN C) 500 mg tablet Take by mouth daily   • aspirin (ECOTRIN LOW STRENGTH) 81 mg EC tablet Take 81 mg by mouth daily   • atenolol (TENORMIN) 25 mg tablet Take 1 tablet (25 mg total) by mouth daily   • Blood Glucose Monitoring Suppl (Contour Next Monitor) w/Device KIT Use bid as directed   • chlordiazePOXIDE (LIBRIUM) 5 mg capsule Take 1 capsule (5 mg total) by mouth 2 (two) times a day   • clotrimazole-betamethasone (LOTRISONE) 1-0 05 % cream Apply topically 2 (two) times a day   • Droplet Pen Needles 32G X 4 MM MISC Inject under the skin in the morning   • ezetimibe (ZETIA) 10 mg tablet Take 10 mg by mouth daily   • fluticasone-umeclidinium-vilanterol 200-62 5-25 mcg/actuation AEPB inhaler Inhale 1 puff daily Rinse mouth after use     • gabapentin (NEURONTIN) 100 mg capsule Take 1 capsule (100 mg total) by mouth 3 (three) times a day   • glucose blood (Contour Next Test) test strip Patient tests blood sugars twice daily   • hydrOXYzine HCL (ATARAX) 25 mg tablet 1-2 tab po qHS prn insomnia   • imipramine (TOFRANIL) 25 mg tablet 1 tab in the morning and 2 tab qHS   • Insulin Glargine Solostar (Lantus SoloStar) 100 UNIT/ML SOPN Inject 0 4 mL (40 Units total) under the skin in the morning   • ketoconazole (NIZORAL) 2 % cream APPLY TOPICALLY TWICE DAILY UNTIL RASH CLEARS UNDER BREASTS   • Lidocaine 4 % PTCH Apply 1 patch topically in the morning   • losartan (COZAAR) 50 mg tablet Take 50 mg by mouth 2 (two) times a day   • meloxicam (Mobic) 7 5 mg tablet Take 1 tablet (7 5 mg total) by mouth daily   • methocarbamol (ROBAXIN) 750 mg tablet Take 1 tablet (750 mg total) by mouth 3 (three) times a day as needed for muscle spasms   • Microlet Lancets MISC Patient tests blood glucose twice daily   • pantoprazole (PROTONIX) 40 mg tablet Take 1 tablet (40 mg total) by mouth daily   • polyethylene glycol (MIRALAX) 17 g packet Take 17 g by mouth daily   • pramipexole (MIRAPEX) 0 75 MG tablet Take 1 tablet (0 75 mg total) by mouth every evening   • Promethazine-DM (PHENERGAN-DM) 6 25-15 mg/5 mL oral syrup Take 5 mL by mouth 4 (four) times a day as needed for cough   • RA Aspirin EC 81 MG EC tablet take 1 tablet by mouth once daily   • repaglinide (PRANDIN) 0 5 mg tablet Take 1 tablet (0 5 mg total) by mouth 3 (three) times a day before meals   • triamcinolone (KENALOG) 0 1 % cream Apply topically 2 (two) times a day   • Vascepa 1 g CAPS Take 2 capsules by mouth 2 (two) times a day   • Vitamin E 400 units TABS Take 2 tablets by mouth daily     Allergies   Allergen Reactions   • Atorvastatin      Reaction Date: 23Dec2011; Unknown reaction  LFT increased   • Clarithromycin      Reaction Date: 23Dec2011;   Patient does not recall reaction   • Hydrogen Peroxide Swelling     Reaction Date: 23Dec2011;   Swelling in the mouth   • Levofloxacin      Patient does not recall her reaction   • Other Swelling     Perioxol (mouth rinse)  Perioxol (mouth rinse)   • Penicillins Hives and Swelling   • Rosuvastatin      Reaction Date: 23Dec2011; Unknown reaction per patient  Increase LFT     Immunization History   Administered Date(s) Administered   • COVID-19 PFIZER VACCINE 0 3 ML IM 02/17/2021, 03/10/2021, 12/09/2021   • Tuberculin Skin Test-PPD Intradermal 04/14/2008       Objective     /80 (BP Location: Left arm, Patient Position: Sitting, Cuff Size: Adult)   Pulse 80   Temp 98 °F (36 7 °C)   Ht 5' (1 524 m)   Wt 70 3 kg (155 lb)   SpO2 98%   BMI 30 27 kg/m²     Physical Exam  Vitals and nursing note reviewed  HENT:      Head: Normocephalic  Right Ear: Tympanic membrane, ear canal and external ear normal       Left Ear: Tympanic membrane, ear canal and external ear normal       Nose: Nose normal       Mouth/Throat:      Mouth: Mucous membranes are moist    Eyes:      Extraocular Movements: Extraocular movements intact  Conjunctiva/sclera: Conjunctivae normal       Pupils: Pupils are equal, round, and reactive to light     Neck:      Vascular: No carotid bruit  Comments: No JVD appreciated  Cardiovascular:      Rate and Rhythm: Normal rate and regular rhythm  Heart sounds:      No gallop  Comments: Unable to appreciate pedal pulses  Pulmonary:      Breath sounds: Rales (dry sounding rales in bases bilat) present  No wheezing or rhonchi  Abdominal:      General: There is no distension  Palpations: There is no mass  Tenderness: There is no abdominal tenderness  Musculoskeletal:         General: Tenderness (upper and lower lumbar spine and paraspinal muscles  R SLR without radicular pain) and deformity (diffuse OA changes in hands) present  No swelling  Cervical back: No tenderness  Right lower leg: No edema  Left lower leg: No edema  Lymphadenopathy:      Cervical: No cervical adenopathy  Skin:     General: Skin is warm  Capillary Refill: Capillary refill takes less than 2 seconds  Neurological:      Mental Status: She is alert and oriented to person, place, and time  Sensory: No sensory deficit  Motor: No weakness  Gait: Gait abnormal (mildly antalgic)     Psychiatric:      Comments:           Denny Gibbons MD

## 2023-06-15 PROBLEM — Z86.73 HISTORY OF CVA (CEREBROVASCULAR ACCIDENT): Status: ACTIVE | Noted: 2022-02-15

## 2023-06-15 PROBLEM — R63.5 WEIGHT GAIN: Status: ACTIVE | Noted: 2023-06-15

## 2023-06-15 PROBLEM — J98.4 RESTRICTIVE LUNG DISEASE: Status: ACTIVE | Noted: 2023-06-05

## 2023-06-15 PROBLEM — M81.0 AGE-RELATED OSTEOPOROSIS WITHOUT CURRENT PATHOLOGICAL FRACTURE: Status: ACTIVE | Noted: 2023-04-28

## 2023-06-15 PROBLEM — Z87.09 HISTORY OF PLEURAL EFFUSION: Status: ACTIVE | Noted: 2023-06-05

## 2023-06-15 PROBLEM — S22.080A COMPRESSION FRACTURE OF T11 VERTEBRA (HCC): Status: ACTIVE | Noted: 2023-04-14

## 2023-06-15 NOTE — ASSESSMENT & PLAN NOTE
"Echo last July revealed worsening EF - \"Systolic function is moderately decreased with an ejection fraction of 35-40%  There is severe hypokinesis of the   anteroseptal wall from mid-wall to apex and entire apex\"  Increased leg swelling or JVD not appreciated, however  Continue present care  F/u with Cardio  Recheck 3-6m       "

## 2023-06-15 NOTE — ASSESSMENT & PLAN NOTE
Seems to be contributing to her R leg pain, though back may be playing a role as well  Continue present care  Encouraged ambulation  Consider PT   Recheck 2m

## 2023-06-15 NOTE — ASSESSMENT & PLAN NOTE
?as a cause of her pain? Physicians at [address] [place] [address] recommended PT  Would continue to monitor   Recheck 6m

## 2023-06-15 NOTE — ASSESSMENT & PLAN NOTE
Lab Results   Component Value Date    HGBA1C 7 8 (H) 02/27/2023   I reviewed with pt  BGs have been elevated, so I would avoid using prednisone for back pain  Continue present care   Recheck 6m

## 2023-06-15 NOTE — ASSESSMENT & PLAN NOTE
Recent DEXA demonstrated T scores of -3 5 at the lumbar spine, -2 8 at the left hip, and -4 1 of the left femoral neck signifying severe osteoporosis  Pt was seen by Rheum at [facility] and started on Evenity  She will take this for 12m then transition onto Prolia   Recheck 3-6m

## 2023-06-19 ENCOUNTER — TELEPHONE (OUTPATIENT)
Dept: FAMILY MEDICINE CLINIC | Facility: CLINIC | Age: 78
End: 2023-06-19

## 2023-06-19 DIAGNOSIS — B37.2 CANDIDAL DERMATITIS: ICD-10-CM

## 2023-06-19 RX ORDER — CLOTRIMAZOLE AND BETAMETHASONE DIPROPIONATE 10; .64 MG/G; MG/G
CREAM TOPICAL 2 TIMES DAILY
Qty: 30 G | Refills: 1 | Status: SHIPPED | OUTPATIENT
Start: 2023-06-19 | End: 2023-06-27 | Stop reason: ALTCHOICE

## 2023-06-19 NOTE — TELEPHONE ENCOUNTER
Patient states she did use the entire tube of Voltaren gel with no relief  This is the low back and it goes across her entire back  She states anytime she is standing or walking she is in pain  She is waiting for the pain mgmt doctor to call her back also  She cannot tell me if this is the same pain as the compression fracture  She denies any urinary tract infection symptoms or signs  No blood in urine  She went to the walk in today for the rash on her breast  The urgent care physician gave her an antibiotic cream and nystatin powder

## 2023-06-19 NOTE — TELEPHONE ENCOUNTER
Patient states that she has developed a rash under her L breast that has spread to the bottom of her breast  She is asking if you advise her to use the Lotrisone cream and if so, can you please send in a refill     Also, she wants you to know that the Ketoconazole cream is not helping her back pain at all   She said she still has a lot of back pain, especially first thing in the morning when she gets out of bed    Patient is also asking if you advise that she should have another test done on her R leg to recheck how far apart the arteries are     Please advise

## 2023-06-20 NOTE — TELEPHONE ENCOUNTER
Pt will call next week with update on rash if no improvement  She said she spoke to spine and pain and they can not do any more injections like she had in her thoracic spine  She said she wanted the same in her lumbar but was told the injection would interact with her prolia  She said she will just try the Tylenol arthritis right now

## 2023-06-20 NOTE — TELEPHONE ENCOUNTER
Pt called again and is asking if she can take Tylenol Arthritis, in addition to all the concerns below

## 2023-06-20 NOTE — TELEPHONE ENCOUNTER
Pt left message on [employer] asking for a call  No other info provided  Need to call back and see what the call is in regards to

## 2023-06-26 ENCOUNTER — OFFICE VISIT (OUTPATIENT)
Dept: FAMILY MEDICINE CLINIC | Facility: CLINIC | Age: 78
End: 2023-06-26
Payer: MEDICARE

## 2023-06-26 VITALS
TEMPERATURE: 97.2 F | OXYGEN SATURATION: 98 % | BODY MASS INDEX: 31.14 KG/M2 | HEIGHT: 60 IN | DIASTOLIC BLOOD PRESSURE: 80 MMHG | WEIGHT: 158.6 LBS | HEART RATE: 80 BPM | SYSTOLIC BLOOD PRESSURE: 120 MMHG

## 2023-06-26 DIAGNOSIS — R23.8 SKIN IRRITATION: Primary | ICD-10-CM

## 2023-06-26 PROCEDURE — 99213 OFFICE O/P EST LOW 20 MIN: CPT | Performed by: FAMILY MEDICINE

## 2023-06-26 RX ORDER — SULFAMETHOXAZOLE AND TRIMETHOPRIM 800; 160 MG/1; MG/1
1 TABLET ORAL EVERY 12 HOURS SCHEDULED
Qty: 20 TABLET | Refills: 0 | Status: SHIPPED | OUTPATIENT
Start: 2023-06-26 | End: 2023-07-06

## 2023-06-27 PROBLEM — R23.8 SKIN IRRITATION: Status: ACTIVE | Noted: 2023-06-27

## 2023-06-27 RX ORDER — NYSTATIN 100000 [USP'U]/G
1 POWDER TOPICAL 2 TIMES DAILY
COMMUNITY
Start: 2023-06-19

## 2023-06-27 NOTE — ASSESSMENT & PLAN NOTE
"Focal areas due to retained moisture along with rubbing of breast against lower chest/upper abd wall  Some areas with drainage  Also has irritation from bandages  No signs of fungal infection, though pt is at risk  Also, no signs of mass or peau d'orange changes  REC: continue topicals  Add Bactrim DS bid x 7-10d   6\" ACE wrap applied around trunk extending to underside of breasts to prevent skin-skin contact   Recheck Thurs or Fri if not improving - earlier if worse  "

## 2023-06-27 NOTE — PROGRESS NOTES
"Name: Sophia Ny      : 1945      MRN: [id number]  Encounter Provider: Marcio Mota MD  Encounter Date: 2023   Encounter department: [address] [place] [address] Road     1  Skin irritation  Assessment & Plan:  Focal areas due to retained moisture along with rubbing of breast against lower chest/upper abd wall  Some areas with drainage  Also has irritation from bandages  No signs of fungal infection, though pt is at risk  Also, no signs of mass or peau d'orange changes  REC: continue topicals  Add Bactrim DS bid x 7-10d   6\" ACE wrap applied around trunk extending to underside of breasts to prevent skin-skin contact  Recheck Thurs or Fri if not improving - earlier if worse    Orders:  -     sulfamethoxazole-trimethoprim (BACTRIM DS) 800-160 mg per tablet; Take 1 tablet by mouth every 12 (twelve) hours for 10 days           Subjective     69 yo female here for persistent dermatitis on the underside of her breasts  Pt states that she has not been able to wear a bra since her CABG due to discomfort  She has tried and failed antifungal creams  She has also tried anti fungal powder and Bactroban prescribed by urgent care  Rash remains sore with a couple of areas draining  No fever/chills  Review of Systems   Constitutional: Negative  Skin: Positive for rash  Past Medical History:   Diagnosis Date   • Gastroenteritis      Past Surgical History:   Procedure Laterality Date   • ATRIAL CARDIAC PACEMAKER INSERTION       Family History   Problem Relation Age of Onset   • Diabetes Sister    • Dementia Sister      Social History     Socioeconomic History   • Marital status:       Spouse name: None   • Number of children: None   • Years of education: None   • Highest education level: None   Occupational History   • None   Tobacco Use   • Smoking status: Former   • Smokeless tobacco: Never   Substance and Sexual Activity   • Alcohol use: Not Currently   • " Drug use: Never   • Sexual activity: None   Other Topics Concern   • None   Social History Narrative   • None     Social Determinants of Health     Financial Resource Strain: Low Risk  (8/11/2022)    Overall Financial Resource Strain (CARDIA)    • Difficulty of Paying Living Expenses: Not very hard   Food Insecurity: Not on file   Transportation Needs: No Transportation Needs (8/11/2022)    PRAPARE - Transportation    • Lack of Transportation (Medical): No    • Lack of Transportation (Non-Medical): No   Physical Activity: Not on file   Stress: Not on file   Social Connections: Not on file   Intimate Partner Violence: Not on file   Housing Stability: Not on file     Current Outpatient Medications on File Prior to Visit   Medication Sig   • acetaminophen-codeine (TYLENOL #3) 300-30 mg per tablet Take 1 tablet by mouth every 6 (six) hours as needed for moderate pain   • albuterol (Proventil HFA) 90 mcg/act inhaler Inhale 2 puffs every 6 (six) hours as needed for wheezing   • ascorbic acid (VITAMIN C) 500 mg tablet Take by mouth daily   • aspirin (ECOTRIN LOW STRENGTH) 81 mg EC tablet Take 81 mg by mouth daily   • atenolol (TENORMIN) 25 mg tablet Take 1 tablet (25 mg total) by mouth daily   • Blood Glucose Monitoring Suppl (Contour Next Monitor) w/Device KIT Use bid as directed   • chlordiazePOXIDE (LIBRIUM) 5 mg capsule Take 1 capsule (5 mg total) by mouth 2 (two) times a day   • Droplet Pen Needles 32G X 4 MM MISC Inject under the skin in the morning   • Evolocumab 140 MG/ML SOAJ Inject 140 mg under the skin every 14 (fourteen) days   • ezetimibe (ZETIA) 10 mg tablet Take 10 mg by mouth daily   • fluticasone-umeclidinium-vilanterol 200-62 5-25 mcg/actuation AEPB inhaler Inhale 1 puff daily Rinse mouth after use     • gabapentin (NEURONTIN) 100 mg capsule Take 1 capsule (100 mg total) by mouth 3 (three) times a day   • glucose blood (Contour Next Test) test strip Patient tests blood sugars twice daily   • hydrOXYzine HCL (ATARAX) 25 mg tablet 1-2 tab po qHS prn insomnia   • imipramine (TOFRANIL) 25 mg tablet 1 tab in the morning and 2 tab qHS   • Insulin Glargine Solostar (Lantus SoloStar) 100 UNIT/ML SOPN Inject 0 4 mL (40 Units total) under the skin in the morning   • Lidocaine 4 % PTCH Apply 1 patch topically in the morning   • losartan (COZAAR) 50 mg tablet Take 50 mg by mouth 2 (two) times a day   • meloxicam (Mobic) 7 5 mg tablet Take 1 tablet (7 5 mg total) by mouth daily   • methocarbamol (ROBAXIN) 750 mg tablet Take 1 tablet (750 mg total) by mouth 3 (three) times a day as needed for muscle spasms   • Microlet Lancets MISC Patient tests blood glucose twice daily   • pantoprazole (PROTONIX) 40 mg tablet Take 1 tablet (40 mg total) by mouth daily   • polyethylene glycol (MIRALAX) 17 g packet Take 17 g by mouth daily   • pramipexole (MIRAPEX) 0 75 MG tablet Take 1 tablet (0 75 mg total) by mouth every evening   • Promethazine-DM (PHENERGAN-DM) 6 25-15 mg/5 mL oral syrup Take 5 mL by mouth 4 (four) times a day as needed for cough   • RA Aspirin EC 81 MG EC tablet take 1 tablet by mouth once daily   • repaglinide (PRANDIN) 0 5 mg tablet Take 1 tablet (0 5 mg total) by mouth 3 (three) times a day before meals   • triamcinolone (KENALOG) 0 1 % cream Apply topically 2 (two) times a day   • Vascepa 1 g CAPS Take 2 capsules by mouth 2 (two) times a day   • Vitamin E 400 units TABS Take 2 tablets by mouth daily   • [DISCONTINUED] clotrimazole-betamethasone (LOTRISONE) 1-0 05 % cream Apply topically 2 (two) times a day   • [DISCONTINUED] ketoconazole (NIZORAL) 2 % cream APPLY TOPICALLY TWICE DAILY UNTIL RASH CLEARS UNDER BREASTS   • mupirocin (BACTROBAN) 2 % ointment Apply 1 Application topically 2 (two) times a day   • nystatin powder Apply 1 Application topically 2 (two) times a day     Allergies   Allergen Reactions   • Atorvastatin      Reaction Date: 23Dec2011;    Unknown reaction  LFT increased   • Clarithromycin      Reaction "Date: 23Dec2011;   Patient does not recall reaction   • Hydrogen Peroxide Swelling     Reaction Date: 23Dec2011;   Swelling in the mouth   • Levofloxacin      Patient does not recall her reaction   • Other Swelling     Perioxol (mouth rinse)  Perioxol (mouth rinse)   • Penicillins Hives and Swelling   • Rosuvastatin      Reaction Date: 23Dec2011; Unknown reaction per patient  Increase LFT     Immunization History   Administered Date(s) Administered   • COVID-19 PFIZER VACCINE 0 3 ML IM 02/17/2021, 03/10/2021, 12/09/2021   • Tuberculin Skin Test-PPD Intradermal 04/14/2008       Objective     /80 (BP Location: Left arm, Patient Position: Sitting, Cuff Size: Adult)   Pulse 80   Temp (!) 97 2 °F (36 2 °C)   Ht 5' (1 524 m)   Wt 71 9 kg (158 lb 9 6 oz)   SpO2 98%   BMI 30 97 kg/m²     Physical Exam  Vitals reviewed  Skin:     Capillary Refill: Capillary refill takes less than 2 seconds  Findings: Erythema and rash present  No bruising  Comments: Focal areas of irritation few areas of skin break on the underside of the breasts  No other skin changes or breast masses appreciated  Separate \"square\" areas of dermatitis associated with adhesive/bandage use   Neurological:      Mental Status: She is alert         Reagan Mo MD  "

## 2023-07-03 DIAGNOSIS — F41.9 ANXIETY: ICD-10-CM

## 2023-07-03 RX ORDER — CHLORDIAZEPOXIDE HYDROCHLORIDE 5 MG/1
5 CAPSULE, GELATIN COATED ORAL 2 TIMES DAILY
Qty: 60 CAPSULE | Refills: 0 | Status: SHIPPED | OUTPATIENT
Start: 2023-07-03 | End: 2023-08-01 | Stop reason: SDUPTHER

## 2023-07-03 NOTE — TELEPHONE ENCOUNTER
1 [id number] 06/01/2023 06/01/2023 chlordiazePOXIDE HCL (Capsule) 60.0 30 5 MG NA [facility] Medicare 0 / 0 [place]    1 [id number] 05/02/2023 05/02/2023 chlordiazePOXIDE HCL (Capsule) 60.0 30 5 MG NA DANNY CHOWDARY POGODZINSKI [facility] Medicare 0 / 0 PA    1 [id number] 03/27/2023 03/27/2023 chlordiazePOXIDE HCL (Capsule) 60.0 30 5 MG NA DANNY CHOWDARY POGODZINSKI

## 2023-07-05 ENCOUNTER — TELEPHONE (OUTPATIENT)
Dept: FAMILY MEDICINE CLINIC | Facility: CLINIC | Age: 78
End: 2023-07-05

## 2023-07-05 NOTE — TELEPHONE ENCOUNTER
FABIAN for a return call. Message received via [employer] below:    Hi, it's Emiliano Civil. When you have time, can you give me a call?

## 2023-07-06 DIAGNOSIS — B37.2 CANDIDAL DERMATITIS: Primary | ICD-10-CM

## 2023-07-14 DIAGNOSIS — E11.9 TYPE 2 DIABETES MELLITUS WITHOUT COMPLICATION, WITHOUT LONG-TERM CURRENT USE OF INSULIN (HCC): ICD-10-CM

## 2023-07-14 RX ORDER — INSULIN GLARGINE 100 [IU]/ML
40 INJECTION, SOLUTION SUBCUTANEOUS DAILY
Qty: 15 ML | Refills: 5 | Status: SHIPPED | OUTPATIENT
Start: 2023-07-14

## 2023-07-31 ENCOUNTER — TELEPHONE (OUTPATIENT)
Dept: FAMILY MEDICINE CLINIC | Facility: CLINIC | Age: 78
End: 2023-07-31

## 2023-07-31 NOTE — TELEPHONE ENCOUNTER
Hi, this is Emiliano Civil. That's Doctor Lavonneo to call in my capsule CHLORDIAZEPOXIDE. And I have an appointment with him August 14th. Ask him if I could move that up. Lucina Warren, there's [phone] Thank you. Everything is. You can't get through. See mobile. You didn't talk to anybody. I spelled out the capitals and I said I have an appointment to 14th asking if I could come in sooner. They'll get back to you. Alright, I'll see you later. Don't overdo it. I gotta go to the bank.

## 2023-08-01 DIAGNOSIS — F41.9 ANXIETY: ICD-10-CM

## 2023-08-01 RX ORDER — CHLORDIAZEPOXIDE HYDROCHLORIDE 5 MG/1
5 CAPSULE, GELATIN COATED ORAL 2 TIMES DAILY
Qty: 60 CAPSULE | Refills: 0 | Status: CANCELLED | OUTPATIENT
Start: 2023-08-01

## 2023-08-01 RX ORDER — CHLORDIAZEPOXIDE HYDROCHLORIDE 5 MG/1
5 CAPSULE, GELATIN COATED ORAL 2 TIMES DAILY
Qty: 60 CAPSULE | Refills: 0 | Status: SHIPPED | OUTPATIENT
Start: 2023-08-01 | End: 2023-09-07

## 2023-08-01 NOTE — TELEPHONE ENCOUNTER
1 [id number] 07/03/2023 07/03/2023 chlordiazePOXIDE HCL (Capsule) 60.0 30 5 MG NA SARIKA REYES [facility] Medicare 0 / 0 PA    1 [id number] 06/01/2023 06/01/2023 chlordiazePOXIDE HCL (Capsule) 60.0 30 5 MG NA [facility] Medicare 0 / 0 PA    1 [id number] 05/02/2023 05/02/2023 chlordiazePOXIDE HCL (Capsule) 60.0 30 5 MG NA DANNY CHOWDARY, YASMINE TH

## 2023-08-01 NOTE — TELEPHONE ENCOUNTER
Patient had wanted to be seen sooner to review her breathing and her back pain again. She has had no changes in either problem, but she just wanted to come in earlier to discuss again. Explained to patient your schedule is pretty full and we will contact her to move appt if there is a cancellation.

## 2023-08-03 ENCOUNTER — RA CDI HCC (OUTPATIENT)
Dept: OTHER | Facility: HOSPITAL | Age: 78
End: 2023-08-03

## 2023-08-03 ENCOUNTER — OFFICE VISIT (OUTPATIENT)
Dept: FAMILY MEDICINE CLINIC | Facility: CLINIC | Age: 78
End: 2023-08-03
Payer: MEDICARE

## 2023-08-03 VITALS
DIASTOLIC BLOOD PRESSURE: 90 MMHG | BODY MASS INDEX: 31.77 KG/M2 | OXYGEN SATURATION: 97 % | WEIGHT: 161.8 LBS | HEART RATE: 78 BPM | HEIGHT: 60 IN | SYSTOLIC BLOOD PRESSURE: 152 MMHG | TEMPERATURE: 96.9 F

## 2023-08-03 DIAGNOSIS — Z79.4 TYPE 2 DIABETES MELLITUS WITH HYPERGLYCEMIA, WITH LONG-TERM CURRENT USE OF INSULIN (HCC): Primary | ICD-10-CM

## 2023-08-03 DIAGNOSIS — I10 PRIMARY HYPERTENSION: ICD-10-CM

## 2023-08-03 DIAGNOSIS — J98.4 RESTRICTIVE LUNG DISEASE: ICD-10-CM

## 2023-08-03 DIAGNOSIS — F33.0 MILD EPISODE OF RECURRENT MAJOR DEPRESSIVE DISORDER (HCC): ICD-10-CM

## 2023-08-03 DIAGNOSIS — E11.65 TYPE 2 DIABETES MELLITUS WITH HYPERGLYCEMIA, WITH LONG-TERM CURRENT USE OF INSULIN (HCC): Primary | ICD-10-CM

## 2023-08-03 DIAGNOSIS — I25.10 3-VESSEL CAD: ICD-10-CM

## 2023-08-03 PROCEDURE — 99214 OFFICE O/P EST MOD 30 MIN: CPT | Performed by: FAMILY MEDICINE

## 2023-08-03 NOTE — PROGRESS NOTES
Name: Lyndon Reinoso      : 1945      MRN: [id number]  Encounter Provider: Bryant Venegas MD  Encounter Date: 8/3/2023   Encounter department: [address] [facility]     1. Type 2 diabetes mellitus with hyperglycemia, with long-term current use of insulin (HCC)  Assessment & Plan:    Lab Results   Component Value Date    HGBA1C 7.8 (H) 2023   BG sl better but pt still gaining weight. She states that her appetite is "out of control". I reviewed with pt. We discussed diet strategies. Continue present care and monitor labs. Recheck 2-3w      2. Restrictive lung disease  Assessment & Plan:  Still with SOB with exertion. Continue present care. F/u with Pulm. Recheck 2-3w      3. Primary hypertension  Assessment & Plan:  Elevated today, though pt is upset re: her partner's health. Continue present care. Recheck 2-3w      4. 3-vessel CAD  Assessment & Plan:  Pt without angina. Continue present care. Recheck 2-3w      5. Mild episode of recurrent major depressive disorder [facility])  Assessment & Plan:  Pt reluctant to start new meds. She believes thather mood will improve as she receives more info re: her partner's medical issue. Will recheck 2-3w             Subjective     Pt here for several concerns  - pt with worsening stress. Partner recently admitted for pleural effusion that is possibly malignancy related. Pt lost her  to lung cancer and is worried about him having cancer  - pt seen by [facility] surgery for claudication. Recent arterial duplex was unchanged  - low back is still severe. Worse with standing/walking. Pain management does not want to give another shot due to her hx of osteoporosis. - still has SOB with exertion. Seen by Pulm but no change in treatment offered. - pt denies CP, palpitations, lightheadedness or other CV symptoms with or without exertion. Pt is up to date with Cardio  - pt states that her BGs are poorly controlled.  Pt due for repeat labs      Review of Systems   Constitutional: Positive for appetite change, fatigue and unexpected weight change. Negative for activity change. HENT: Negative. Eyes: Negative. Respiratory: Positive for shortness of breath. Negative for cough. Cardiovascular: Positive for leg swelling. Negative for chest pain and palpitations. Gastrointestinal: Negative. Endocrine: Negative. Genitourinary: Negative. Musculoskeletal: Positive for arthralgias, back pain, gait problem and myalgias. Skin: Negative. Neurological: Negative for dizziness, weakness, light-headedness and numbness. Psychiatric/Behavioral: Positive for dysphoric mood and sleep disturbance. The patient is nervous/anxious. Past Medical History:   Diagnosis Date   • Gastroenteritis      Past Surgical History:   Procedure Laterality Date   • ATRIAL CARDIAC PACEMAKER INSERTION       Family History   Problem Relation Age of Onset   • Diabetes Sister    • Dementia Sister      Social History     Socioeconomic History   • Marital status:      Spouse name: None   • Number of children: None   • Years of education: None   • Highest education level: None   Occupational History   • None   Tobacco Use   • Smoking status: Former   • Smokeless tobacco: Never   Substance and Sexual Activity   • Alcohol use: Not Currently   • Drug use: Never   • Sexual activity: None   Other Topics Concern   • None   Social History Narrative   • None     Social Determinants of Health     Financial Resource Strain: Low Risk  (8/11/2022)    Overall Financial Resource Strain (CARDIA)    • Difficulty of Paying Living Expenses: Not very hard   Food Insecurity: Not on file   Transportation Needs: No Transportation Needs (8/11/2022)    PRAPARE - Transportation    • Lack of Transportation (Medical): No    • Lack of Transportation (Non-Medical):  No   Physical Activity: Not on file   Stress: Not on file   Social Connections: Not on file   Intimate Partner Violence: Not on file   Housing Stability: Not on file     Current Outpatient Medications on File Prior to Visit   Medication Sig   • acetaminophen-codeine (TYLENOL #3) 300-30 mg per tablet Take 1 tablet by mouth every 6 (six) hours as needed for moderate pain   • albuterol (Proventil HFA) 90 mcg/act inhaler Inhale 2 puffs every 6 (six) hours as needed for wheezing   • ascorbic acid (VITAMIN C) 500 mg tablet Take by mouth daily   • aspirin (ECOTRIN LOW STRENGTH) 81 mg EC tablet Take 81 mg by mouth daily   • atenolol (TENORMIN) 25 mg tablet Take 1 tablet (25 mg total) by mouth daily   • Blood Glucose Monitoring Suppl (Contour Next Monitor) w/Device KIT Use bid as directed   • chlordiazePOXIDE (LIBRIUM) 5 mg capsule Take 1 capsule (5 mg total) by mouth 2 (two) times a day   • Droplet Pen Needles 32G X 4 MM MISC Inject under the skin in the morning   • Evolocumab 140 MG/ML SOAJ Inject 140 mg under the skin every 14 (fourteen) days   • ezetimibe (ZETIA) 10 mg tablet Take 10 mg by mouth daily   • fluticasone-umeclidinium-vilanterol 200-62.5-25 mcg/actuation AEPB inhaler Inhale 1 puff daily Rinse mouth after use.    • gabapentin (NEURONTIN) 100 mg capsule Take 1 capsule (100 mg total) by mouth 3 (three) times a day   • glucose blood (Contour Next Test) test strip Patient tests blood sugars twice daily   • hydrOXYzine HCL (ATARAX) 25 mg tablet 1-2 tab po qHS prn insomnia   • imipramine (TOFRANIL) 25 mg tablet 1 tab in the morning and 2 tab qHS   • Insulin Glargine Solostar (Lantus SoloStar) 100 UNIT/ML SOPN Inject 0.4 mL (40 Units total) under the skin in the morning   • Lidocaine 4 % PTCH Apply 1 patch topically in the morning   • losartan (COZAAR) 50 mg tablet Take 50 mg by mouth 2 (two) times a day   • meloxicam (Mobic) 7.5 mg tablet Take 1 tablet (7.5 mg total) by mouth daily   • methocarbamol (ROBAXIN) 750 mg tablet Take 1 tablet (750 mg total) by mouth 3 (three) times a day as needed for muscle spasms   • Microlet Lancets MISC Patient tests blood glucose twice daily   • mupirocin (BACTROBAN) 2 % ointment Apply 1 Application topically 2 (two) times a day   • nystatin powder Apply 1 Application topically 2 (two) times a day   • pantoprazole (PROTONIX) 40 mg tablet Take 1 tablet (40 mg total) by mouth daily   • polyethylene glycol (MIRALAX) 17 g packet Take 17 g by mouth daily   • pramipexole (MIRAPEX) 0.75 MG tablet Take 1 tablet (0.75 mg total) by mouth every evening   • Promethazine-DM (PHENERGAN-DM) 6.25-15 mg/5 mL oral syrup Take 5 mL by mouth 4 (four) times a day as needed for cough   • RA Aspirin EC 81 MG EC tablet take 1 tablet by mouth once daily   • triamcinolone (KENALOG) 0.1 % cream Apply topically 2 (two) times a day   • Vascepa 1 g CAPS Take 2 capsules by mouth 2 (two) times a day   • Vitamin E 400 units TABS Take 2 tablets by mouth daily     Allergies   Allergen Reactions   • Atorvastatin      Reaction Date: 23Dec2011; Unknown reaction  LFT increased   • Clarithromycin      Reaction Date: 23Dec2011;   Patient does not recall reaction   • Hydrogen Peroxide Swelling     Reaction Date: 23Dec2011;   Swelling in the mouth   • Levofloxacin      Patient does not recall her reaction   • Other Swelling     Perioxol (mouth rinse)  Perioxol (mouth rinse)   • Penicillins Hives and Swelling   • Rosuvastatin      Reaction Date: 23Dec2011; Unknown reaction per patient  Increase LFT     Immunization History   Administered Date(s) Administered   • COVID-19 PFIZER VACCINE 0.3 ML IM 02/17/2021, 03/10/2021, 12/09/2021   • Tuberculin Skin Test-PPD Intradermal 04/14/2008       Objective     /90 (BP Location: Left arm, Patient Position: Sitting, Cuff Size: Standard)   Pulse 78   Temp (!) 96.9 °F (36.1 °C) (Tympanic)   Ht 5' (1.524 m)   Wt 73.4 kg (161 lb 12.8 oz)   SpO2 97%   BMI 31.60 kg/m²     Physical Exam  Vitals reviewed. HENT:      Head: Normocephalic.       Nose: Nose normal.      Mouth/Throat:      Mouth: Mucous membranes are moist.   Eyes:      Extraocular Movements: Extraocular movements intact. Conjunctiva/sclera: Conjunctivae normal.      Pupils: Pupils are equal, round, and reactive to light. Neck:      Comments: No JVD apreciated  Cardiovascular:      Rate and Rhythm: Normal rate and regular rhythm. Pulmonary:      Effort: Pulmonary effort is normal.      Breath sounds: No wheezing or rales. Abdominal:      General: There is no distension. Palpations: There is no mass. Tenderness: There is no abdominal tenderness. Musculoskeletal:         General: Tenderness (over lower lumbar spine and paraspinal muscles) present. Cervical back: No tenderness. Right lower leg: Edema (trace) present. Left lower leg: Edema (trace) present. Lymphadenopathy:      Cervical: No cervical adenopathy. Skin:     General: Skin is warm. Capillary Refill: Capillary refill takes less than 2 seconds. Neurological:      Mental Status: She is alert and oriented to person, place, and time. Cranial Nerves: No cranial nerve deficit. Sensory: No sensory deficit. Motor: No weakness. Psychiatric:      Comments: PHQ-2/9 Depression Screening    Little interest or pleasure in doing things: 0 - not at all  Feeling down, depressed, or hopeless: 1 - several days  Trouble falling or staying asleep, or sleeping too much: 3 - nearly every   day  Feeling tired or having little energy: 0 - not at all  Poor appetite or overeating: 3 - nearly every day  Feeling bad about yourself - or that you are a failure or have let   yourself or your family down: 0 - not at all  Trouble concentrating on things, such as reading the newspaper or watching   television: 0 - not at all  Moving or speaking so slowly that other people could have noticed.  Or the   opposite - being so fidgety or restless that you have been moving around a   lot more than usual: 0 - not at all  Thoughts that you would be better off dead, or of hurting yourself in some   way: 0 - not at all  PHQ-9 Score: 7   PHQ-9 Interpretation: Mild depression        Sonia Frazier MD

## 2023-08-03 NOTE — PROGRESS NOTES
E11.51   [address] [facility] coding opportunities          Chart Reviewed number of suggestions sent to Provider: 1     Patients Insurance     Medicare Insurance: [employer]

## 2023-08-06 DIAGNOSIS — E11.9 TYPE 2 DIABETES MELLITUS WITHOUT COMPLICATION, WITHOUT LONG-TERM CURRENT USE OF INSULIN (HCC): ICD-10-CM

## 2023-08-07 RX ORDER — REPAGLINIDE 0.5 MG/1
0.5 TABLET ORAL
Qty: 90 TABLET | Refills: 1 | Status: SHIPPED | OUTPATIENT
Start: 2023-08-07

## 2023-08-08 PROBLEM — F33.0 MILD EPISODE OF RECURRENT MAJOR DEPRESSIVE DISORDER (HCC): Status: ACTIVE | Noted: 2017-05-10

## 2023-08-08 NOTE — ASSESSMENT & PLAN NOTE
Lab Results   Component Value Date    HGBA1C 7.8 (H) 02/27/2023   BG sl better but pt still gaining weight. She states that her appetite is "out of control". I reviewed with pt. We discussed diet strategies. Continue present care and monitor labs.  Recheck 2-3w

## 2023-08-08 NOTE — ASSESSMENT & PLAN NOTE
Pt reluctant to start new meds. She believes thather mood will improve as she receives more info re: her partner's medical issue.  Will recheck 2-3w

## 2023-08-09 ENCOUNTER — TELEPHONE (OUTPATIENT)
Dept: FAMILY MEDICINE CLINIC | Facility: CLINIC | Age: 78
End: 2023-08-09

## 2023-08-09 NOTE — TELEPHONE ENCOUNTER
Patient left the following message on outlook       Hi, this is Joan Carvajal. When I was there, I forgot to ask Doctor Danilo if he could get me a handicap sticker. Please tell him thank you.  Bye.

## 2023-08-14 ENCOUNTER — APPOINTMENT (OUTPATIENT)
Dept: LAB | Facility: CLINIC | Age: 78
End: 2023-08-14
Payer: MEDICARE

## 2023-08-14 ENCOUNTER — OFFICE VISIT (OUTPATIENT)
Dept: FAMILY MEDICINE CLINIC | Facility: CLINIC | Age: 78
End: 2023-08-14
Payer: MEDICARE

## 2023-08-14 VITALS
TEMPERATURE: 97.6 F | DIASTOLIC BLOOD PRESSURE: 78 MMHG | HEART RATE: 82 BPM | BODY MASS INDEX: 31.77 KG/M2 | WEIGHT: 161.8 LBS | SYSTOLIC BLOOD PRESSURE: 122 MMHG | OXYGEN SATURATION: 93 % | HEIGHT: 60 IN

## 2023-08-14 DIAGNOSIS — R06.00 DYSPNEA, UNSPECIFIED TYPE: ICD-10-CM

## 2023-08-14 DIAGNOSIS — J98.4 RESTRICTIVE LUNG DISEASE: ICD-10-CM

## 2023-08-14 DIAGNOSIS — Z79.4 TYPE 2 DIABETES MELLITUS WITH HYPERGLYCEMIA, WITH LONG-TERM CURRENT USE OF INSULIN (HCC): ICD-10-CM

## 2023-08-14 DIAGNOSIS — R09.89 RALES: ICD-10-CM

## 2023-08-14 DIAGNOSIS — Z00.00 MEDICARE ANNUAL WELLNESS VISIT, SUBSEQUENT: Primary | ICD-10-CM

## 2023-08-14 DIAGNOSIS — I10 PRIMARY HYPERTENSION: ICD-10-CM

## 2023-08-14 DIAGNOSIS — E11.9 TYPE 2 DIABETES MELLITUS WITHOUT COMPLICATION, WITHOUT LONG-TERM CURRENT USE OF INSULIN (HCC): ICD-10-CM

## 2023-08-14 DIAGNOSIS — R06.2 WHEEZE: ICD-10-CM

## 2023-08-14 DIAGNOSIS — R63.5 WEIGHT GAIN: ICD-10-CM

## 2023-08-14 DIAGNOSIS — E11.65 TYPE 2 DIABETES MELLITUS WITH HYPERGLYCEMIA, WITH LONG-TERM CURRENT USE OF INSULIN (HCC): ICD-10-CM

## 2023-08-14 DIAGNOSIS — M54.50 CHRONIC MIDLINE LOW BACK PAIN WITHOUT SCIATICA: ICD-10-CM

## 2023-08-14 DIAGNOSIS — G89.29 CHRONIC MIDLINE LOW BACK PAIN WITHOUT SCIATICA: ICD-10-CM

## 2023-08-14 LAB
ALBUMIN SERPL BCP-MCNC: 3.7 G/DL (ref 3.5–5)
ALP SERPL-CCNC: 125 U/L (ref 46–116)
ALT SERPL W P-5'-P-CCNC: 47 U/L (ref 12–78)
ANION GAP SERPL CALCULATED.3IONS-SCNC: 5 MMOL/L
AST SERPL W P-5'-P-CCNC: 24 U/L (ref 5–45)
BACTERIA UR QL AUTO: NORMAL /HPF
BASOPHILS # BLD AUTO: 0.04 THOUSANDS/ÂΜL (ref 0–0.1)
BASOPHILS NFR BLD AUTO: 0 % (ref 0–1)
BILIRUB SERPL-MCNC: 1.26 MG/DL (ref 0.2–1)
BILIRUB UR QL STRIP: NEGATIVE
BNP SERPL-MCNC: 278 PG/ML (ref 0–100)
BUN SERPL-MCNC: 31 MG/DL (ref 5–25)
CALCIUM SERPL-MCNC: 9.1 MG/DL (ref 8.3–10.1)
CHLORIDE SERPL-SCNC: 104 MMOL/L (ref 96–108)
CHOLEST SERPL-MCNC: 162 MG/DL
CLARITY UR: CLEAR
CO2 SERPL-SCNC: 27 MMOL/L (ref 21–32)
COLOR UR: ABNORMAL
CORTIS AM PEAK SERPL-MCNC: 7.2 UG/DL (ref 6.7–22.6)
CREAT SERPL-MCNC: 0.9 MG/DL (ref 0.6–1.3)
EOSINOPHIL # BLD AUTO: 0.01 THOUSAND/ÂΜL (ref 0–0.61)
EOSINOPHIL NFR BLD AUTO: 0 % (ref 0–6)
ERYTHROCYTE [DISTWIDTH] IN BLOOD BY AUTOMATED COUNT: 14.6 % (ref 11.6–15.1)
EST. AVERAGE GLUCOSE BLD GHB EST-MCNC: 212 MG/DL
GFR SERPL CREATININE-BSD FRML MDRD: 61 ML/MIN/1.73SQ M
GLUCOSE P FAST SERPL-MCNC: 245 MG/DL (ref 65–99)
GLUCOSE UR STRIP-MCNC: ABNORMAL MG/DL
HBA1C MFR BLD: 9 %
HCT VFR BLD AUTO: 38.7 % (ref 34.8–46.1)
HDLC SERPL-MCNC: 45 MG/DL
HGB BLD-MCNC: 13.6 G/DL (ref 11.5–15.4)
HGB UR QL STRIP.AUTO: NEGATIVE
IMM GRANULOCYTES # BLD AUTO: 0.07 THOUSAND/UL (ref 0–0.2)
IMM GRANULOCYTES NFR BLD AUTO: 1 % (ref 0–2)
KETONES UR STRIP-MCNC: NEGATIVE MG/DL
LDLC SERPL CALC-MCNC: 81 MG/DL (ref 0–100)
LEUKOCYTE ESTERASE UR QL STRIP: ABNORMAL
LYMPHOCYTES # BLD AUTO: 1.47 THOUSANDS/ÂΜL (ref 0.6–4.47)
LYMPHOCYTES NFR BLD AUTO: 14 % (ref 14–44)
MCH RBC QN AUTO: 33.8 PG (ref 26.8–34.3)
MCHC RBC AUTO-ENTMCNC: 35.1 G/DL (ref 31.4–37.4)
MCV RBC AUTO: 96 FL (ref 82–98)
MONOCYTES # BLD AUTO: 0.25 THOUSAND/ÂΜL (ref 0.17–1.22)
MONOCYTES NFR BLD AUTO: 2 % (ref 4–12)
NEUTROPHILS # BLD AUTO: 9.01 THOUSANDS/ÂΜL (ref 1.85–7.62)
NEUTS SEG NFR BLD AUTO: 83 % (ref 43–75)
NITRITE UR QL STRIP: NEGATIVE
NON-SQ EPI CELLS URNS QL MICRO: NORMAL /HPF
NRBC BLD AUTO-RTO: 0 /100 WBCS
PH UR STRIP.AUTO: 5.5 [PH]
PLATELET # BLD AUTO: 306 THOUSANDS/UL (ref 149–390)
PMV BLD AUTO: 12 FL (ref 8.9–12.7)
POTASSIUM SERPL-SCNC: 4.4 MMOL/L (ref 3.5–5.3)
PROT SERPL-MCNC: 7.2 G/DL (ref 6.4–8.4)
PROT UR STRIP-MCNC: NEGATIVE MG/DL
RBC # BLD AUTO: 4.02 MILLION/UL (ref 3.81–5.12)
RBC #/AREA URNS AUTO: NORMAL /HPF
SODIUM SERPL-SCNC: 136 MMOL/L (ref 135–147)
SP GR UR STRIP.AUTO: 1.02 (ref 1–1.03)
TRIGL SERPL-MCNC: 178 MG/DL
TSH SERPL DL<=0.05 MIU/L-ACNC: 0.47 UIU/ML (ref 0.45–4.5)
UROBILINOGEN UR STRIP-ACNC: <2 MG/DL
WBC # BLD AUTO: 10.85 THOUSAND/UL (ref 4.31–10.16)
WBC #/AREA URNS AUTO: NORMAL /HPF

## 2023-08-14 PROCEDURE — 83036 HEMOGLOBIN GLYCOSYLATED A1C: CPT

## 2023-08-14 PROCEDURE — G0442 ANNUAL ALCOHOL SCREEN 15 MIN: HCPCS | Performed by: FAMILY MEDICINE

## 2023-08-14 PROCEDURE — 81001 URINALYSIS AUTO W/SCOPE: CPT | Performed by: FAMILY MEDICINE

## 2023-08-14 PROCEDURE — G0439 PPPS, SUBSEQ VISIT: HCPCS | Performed by: FAMILY MEDICINE

## 2023-08-14 PROCEDURE — 82533 TOTAL CORTISOL: CPT

## 2023-08-14 PROCEDURE — 83880 ASSAY OF NATRIURETIC PEPTIDE: CPT

## 2023-08-14 PROCEDURE — 36415 COLL VENOUS BLD VENIPUNCTURE: CPT

## 2023-08-14 PROCEDURE — 80053 COMPREHEN METABOLIC PANEL: CPT

## 2023-08-14 PROCEDURE — 99214 OFFICE O/P EST MOD 30 MIN: CPT | Performed by: FAMILY MEDICINE

## 2023-08-14 PROCEDURE — 85025 COMPLETE CBC W/AUTO DIFF WBC: CPT

## 2023-08-14 PROCEDURE — 80061 LIPID PANEL: CPT

## 2023-08-14 PROCEDURE — 87086 URINE CULTURE/COLONY COUNT: CPT | Performed by: FAMILY MEDICINE

## 2023-08-14 PROCEDURE — 84443 ASSAY THYROID STIM HORMONE: CPT

## 2023-08-14 RX ORDER — PREDNISONE 10 MG/1
TABLET ORAL DAILY
COMMUNITY

## 2023-08-14 NOTE — PROGRESS NOTES
Assessment and Plan:     Problem List Items Addressed This Visit        Endocrine    Type 2 diabetes mellitus with hyperglycemia, with long-term current use of insulin ([address])       Lab Results   Component Value Date    HGBA1C 9.0 (H) 08/14/2023   Pt had labs done this morning and results not available during visit. A1C later = 9.0, though pt has been on pred for her breathing. Continue lantus. Increase repaglinide to 1mg tid while on pred. Pt to monitor home BG and call Friday with results. Recheck 1w if not improving            Respiratory    Restrictive lung disease     Pt just started on pred taper but has not noted any improvement yet. Continue to monitor. F/u with pulm            Cardiovascular and Mediastinum    Hypertension     Well controlled. Cont present treatment. Monitor labs. Recheck 6m              Other    Chronic midline low back pain without sciatica     I discussed with pt. No improvement with pred taper. Continue present care. Consider pain management eval. Recheck 3m - earlier if worse. Other Visit Diagnoses     Medicare annual wellness visit, subsequent    -  Primary           Preventive health issues were discussed with patient, and age appropriate screening tests were ordered as noted in patient's After Visit Summary. Personalized health advice and appropriate referrals for health education or preventive services given if needed, as noted in patient's After Visit Summary. History of Present Illness:     Patient presents for a Medicare Wellness Visit    f/u multiple med issues and and AWV  - pt is unchanged. Pt was seen 2 weeks ago for increased stress, back pain, and SOB. - since last visit, pt contacted her pulmonologist who started her on a pred taper. She has not noticed any improvement in her breathing, but she has noted increased BGs - can get up to 200-210 if she cheats. He anxiety is unchanged. Pt denies chest pain/discomfort.    - pt still very anxious re: her partners health. He is believed to have mesothelioma. Awaiting visit with a surgeon on 8/25. Sleep is very labile. - low back is still severe. Pt denies any changes in bowel or bladder. Pain is better with sitting, but worse with standing and walking. Pt does not any improvement since she started the steroid taper.   - pt due for a repeat Prolia shot 12/18/23.   - AWV done     Patient Care Team:  Ronnie Leo MD as PCP - General  Meseret Taveras MD     Review of Systems:     Review of Systems   Constitutional: Positive for appetite change, fatigue and unexpected weight change. Negative for activity change. HENT: Negative. Eyes: Negative. Respiratory: Positive for shortness of breath (unchanged on pred). Negative for cough. Cardiovascular: Positive for leg swelling. Negative for chest pain and palpitations. Gastrointestinal: Negative. Endocrine: Negative. Genitourinary: Negative. Musculoskeletal: Positive for arthralgias (may be sl improved on pred), back pain (unchanged on pred), gait problem and myalgias. Skin: Negative. Neurological: Negative for dizziness, weakness, light-headedness and numbness. Psychiatric/Behavioral: Positive for dysphoric mood and sleep disturbance. The patient is nervous/anxious.          Problem List:     Patient Active Problem List   Diagnosis   • Anxiety   • Neck pain   • Elevated ALT measurement   • Elevated AST (SGOT)   • Mixed hyperlipidemia   • Hypertension   • Mild episode of recurrent major depressive disorder (HCC)   • Heart block AV second degree   • Type 2 diabetes mellitus with hyperglycemia, with long-term current use of insulin (HCC)   • Cardiac pacemaker in situ   • Health care maintenance   • Heart block AV complete (HCC)   • Carotid stenosis, right   • Tobacco abuse   • 3-vessel CAD   • Acute blood loss anemia   • History of CVA (cerebrovascular accident)   • Dysphagia   • Myocardiopathy (HCC)   • S/P CABG (coronary artery bypass graft)   • Cardiomyopathy, ischemic   • Rales   • Peripheral arterial disease (HCC)   • Restless leg   • Primary insomnia   • Coagulopathy (HCC)   • Wheeze   • Dysuria   • Candidal dermatitis   • Mid back pain   • Urinary symptom or sign   • Left flank pain   • Chronic midline low back pain without sciatica   • Age-related osteoporosis without current pathological fracture   • Compression fracture of T11 vertebra (HCC)   • History of pleural effusion   • Restrictive lung disease   • Weight gain   • Skin irritation      Past Medical and Surgical History:     Past Medical History:   Diagnosis Date   • Gastroenteritis      Past Surgical History:   Procedure Laterality Date   • ATRIAL CARDIAC PACEMAKER INSERTION        Family History:     Family History   Problem Relation Age of Onset   • Diabetes Sister    • Dementia Sister       Social History:     Social History     Socioeconomic History   • Marital status:      Spouse name: None   • Number of children: None   • Years of education: None   • Highest education level: None   Occupational History   • None   Tobacco Use   • Smoking status: Former   • Smokeless tobacco: Never   Substance and Sexual Activity   • Alcohol use: Not Currently   • Drug use: Never   • Sexual activity: None   Other Topics Concern   • None   Social History Narrative   • None     Social Determinants of Health     Financial Resource Strain: Low Risk  (8/14/2023)    Overall Financial Resource Strain (CARDIA)    • Difficulty of Paying Living Expenses: Not hard at all   Food Insecurity: Not on file   Transportation Needs: No Transportation Needs (8/14/2023)    PRAPARE - Transportation    • Lack of Transportation (Medical): No    • Lack of Transportation (Non-Medical):  No   Physical Activity: Not on file   Stress: Not on file   Social Connections: Not on file   Intimate Partner Violence: Not on file   Housing Stability: Not on file      Medications and Allergies:     Current Outpatient Medications Medication Sig Dispense Refill   • Acetaminophen (TYLENOL ARTHRITIS PAIN PO) Take by mouth     • albuterol (Proventil HFA) 90 mcg/act inhaler Inhale 2 puffs every 6 (six) hours as needed for wheezing 6.7 g 5   • ascorbic acid (VITAMIN C) 500 mg tablet Take by mouth daily     • aspirin (ECOTRIN LOW STRENGTH) 81 mg EC tablet Take 81 mg by mouth daily     • atenolol (TENORMIN) 25 mg tablet Take 1 tablet (25 mg total) by mouth daily 90 tablet 0   • Blood Glucose Monitoring Suppl (Contour Next Monitor) w/Device KIT Use bid as directed 1 kit 1   • chlordiazePOXIDE (LIBRIUM) 5 mg capsule Take 1 capsule (5 mg total) by mouth 2 (two) times a day 60 capsule 0   • Droplet Pen Needles 32G X 4 MM MISC Inject under the skin in the morning 50 each 5   • Evolocumab 140 MG/ML SOAJ Inject 140 mg under the skin every 14 (fourteen) days     • ezetimibe (ZETIA) 10 mg tablet Take 10 mg by mouth daily     • fluticasone-umeclidinium-vilanterol 200-62.5-25 mcg/actuation AEPB inhaler Inhale 1 puff daily Rinse mouth after use.  3 each 3   • gabapentin (NEURONTIN) 100 mg capsule Take 1 capsule (100 mg total) by mouth 3 (three) times a day 90 capsule 3   • glucose blood (Contour Next Test) test strip Patient tests blood sugars twice daily 100 each 5   • hydrOXYzine HCL (ATARAX) 25 mg tablet 1-2 tab po qHS prn insomnia 60 tablet 3   • imipramine (TOFRANIL) 25 mg tablet 1 tab in the morning and 2 tab qHS 270 tablet 3   • Insulin Glargine Solostar (Lantus SoloStar) 100 UNIT/ML SOPN Inject 0.4 mL (40 Units total) under the skin in the morning 15 mL 5   • losartan (COZAAR) 50 mg tablet Take 50 mg by mouth 2 (two) times a day     • Microlet Lancets MISC Patient tests blood glucose twice daily 100 each 5   • pantoprazole (PROTONIX) 40 mg tablet Take 1 tablet (40 mg total) by mouth daily 30 tablet 3   • pramipexole (MIRAPEX) 0.75 MG tablet Take 1 tablet (0.75 mg total) by mouth every evening 30 tablet 5   • predniSONE 10 mg tablet Take by mouth daily • repaglinide (PRANDIN) 0.5 mg tablet take 1 tablet by mouth three times a day before meals 90 tablet 1   • Vascepa 1 g CAPS Take 2 capsules by mouth 2 (two) times a day     • Vitamin E 400 units TABS Take 2 tablets by mouth daily     • triamcinolone (KENALOG) 0.1 % cream Apply topically 2 (two) times a day (Patient not taking: Reported on 8/14/2023) 30 g 0     No current facility-administered medications for this visit. Allergies   Allergen Reactions   • Atorvastatin      Reaction Date: 23Dec2011; Unknown reaction  LFT increased   • Clarithromycin      Reaction Date: 23Dec2011;   Patient does not recall reaction   • Hydrogen Peroxide Swelling     Reaction Date: 23Dec2011;   Swelling in the mouth   • Levofloxacin      Patient does not recall her reaction   • Other Swelling     Perioxol (mouth rinse)  Perioxol (mouth rinse)   • Penicillins Hives and Swelling   • Rosuvastatin      Reaction Date: 23Dec2011; Unknown reaction per patient  Increase LFT      Immunizations:     Immunization History   Administered Date(s) Administered   • COVID-19 PFIZER VACCINE 0.3 ML IM 02/17/2021, 03/10/2021, 12/09/2021   • Tuberculin Skin Test-PPD Intradermal 04/14/2008      Health Maintenance:         Topic Date Due   • Breast Cancer Screening: Mammogram  04/24/2009   • DXA SCAN  11/14/2025   • Hepatitis C Screening  Completed         Topic Date Due   • Pneumococcal Vaccine: 65+ Years (1 - PCV) Never done   • COVID-19 Vaccine (4 - Pfizer series) 02/03/2022   • Influenza Vaccine (1) 09/01/2023      Medicare Screening Tests and Risk Assessments:     Idalia Chawla is here for her Subsequent Wellness visit. Last Medicare Wellness visit information reviewed, patient interviewed and updates made to the record today. Health Risk Assessment:   Patient rates overall health as fair. Patient feels that their physical health rating is slightly worse. Patient is satisfied with their life. Eyesight was rated as same.  Hearing was rated as same. Patient feels that their emotional and mental health rating is same. Patients states they are never, rarely angry. Patient states they are often unusually tired/fatigued. Pain experienced in the last 7 days has been a lot. Patient's pain rating has been 8/10. Patient states that she has experienced no weight loss or gain in last 6 months. Low back pain    Depression Screening:   PHQ-9 Score: 4      Fall Risk Screening: In the past year, patient has experienced: no history of falling in past year      Urinary Incontinence Screening:   Patient has not leaked urine accidently in the last six months. Home Safety:  Patient has trouble with stairs inside or outside of their home. Patient has working smoke alarms and has working carbon monoxide detector. Home safety hazards include: none. Nutrition:   Current diet is Regular. Medications:   Patient is currently taking over-the-counter supplements. OTC medications include: see medication list. Patient is able to manage medications. Activities of Daily Living (ADLs)/Instrumental Activities of Daily Living (IADLs):   Walk and transfer into and out of bed and chair?: Yes  Dress and groom yourself?: Yes    Bathe or shower yourself?: Yes    Feed yourself? Yes  Do your laundry/housekeeping?: Yes  Manage your money, pay your bills and track your expenses?: Yes  Make your own meals?: Yes    Do your own shopping?: Yes    Previous Hospitalizations:   Any hospitalizations or ED visits within the last 12 months?: Yes    How many hospitalizations have you had in the last year?: 1-2    Advance Care Planning:   Living will: Yes    Durable POA for healthcare:  Yes    Advanced directive: Yes    Advanced directive counseling given: Yes      Cognitive Screening:   Provider or family/friend/caregiver concerned regarding cognition?: No    PREVENTIVE SCREENINGS      Cardiovascular Screening:    General: Screening Not Indicated and History Lipid Disorder      Diabetes Screening:     General: Screening Not Indicated and History Diabetes      Colorectal Cancer Screening:     General: Patient Declines      Breast Cancer Screening:     General: Patient Declines      Cervical Cancer Screening:    General: Screening Not Indicated      Osteoporosis Screening:    General: Screening Not Indicated and History Osteoporosis      Abdominal Aortic Aneurysm (AAA) Screening:        General: Screening Not Indicated      Lung Cancer Screening:     General: Screening Not Indicated      Hepatitis C Screening:    General: Screening Current    Screening, Brief Intervention, and Referral to Treatment (SBIRT)    Screening      AUDIT-C Screenin) How often did you have a drink containing alcohol in the past year? never  2) How many drinks did you have on a typical day when you were drinking in the past year? 0  3) How often did you have 6 or more drinks on one occasion in the past year? never    AUDIT-C Score: 0  Interpretation: Score 0-2 (female): Negative screen for alcohol misuse    Single Item Drug Screening:  How often have you used an illegal drug (including marijuana) or a prescription medication for non-medical reasons in the past year? never    Single Item Drug Screen Score: 0  Interpretation: Negative screen for possible drug use disorder    Time Spent  Time spent screening/evaluating the patient for alcohol misuse: 5 minutes. Other Counseling Topics:   Calcium and vitamin D intake and regular weightbearing exercise. No results found. Physical Exam:     /78 (BP Location: Left arm, Patient Position: Sitting, Cuff Size: Adult)   Pulse 82   Temp 97.6 °F (36.4 °C)   Ht 5' (1.524 m)   Wt 73.4 kg (161 lb 12.8 oz)   SpO2 93%   BMI 31.60 kg/m²     Physical Exam  Vitals reviewed. Constitutional:       Appearance: Normal appearance. HENT:      Head: Normocephalic.       Right Ear: Tympanic membrane, ear canal and external ear normal.      Left Ear: Tympanic membrane, ear canal and external ear normal.      Nose: Nose normal.      Mouth/Throat:      Mouth: Mucous membranes are moist.   Eyes:      Extraocular Movements: Extraocular movements intact. Conjunctiva/sclera: Conjunctivae normal.      Pupils: Pupils are equal, round, and reactive to light. Cardiovascular:      Rate and Rhythm: Normal rate and regular rhythm. Pulses: no weak pulses          Dorsalis pedis pulses are 1+ on the right side and 1+ on the left side. Pulmonary:      Effort: Pulmonary effort is normal.      Breath sounds: No wheezing or rales. Comments: Poor air movement throughout. No dullness to percussion  Abdominal:      General: There is no distension. Palpations: There is no mass. Tenderness: There is no abdominal tenderness. Musculoskeletal:         General: Tenderness (mild L low back/flank. Increase with movement) and deformity (scattered arthritic changes) present. No swelling. Cervical back: No tenderness. Right lower leg: No edema. Left lower leg: No edema. Feet:      Right foot:      Skin integrity: No ulcer, skin breakdown, erythema, warmth, callus or dry skin. Left foot:      Skin integrity: No ulcer, skin breakdown, erythema, warmth, callus or dry skin. Lymphadenopathy:      Cervical: No cervical adenopathy. Skin:     Capillary Refill: Capillary refill takes less than 2 seconds. Neurological:      General: No focal deficit present. Mental Status: She is alert and oriented to person, place, and time. Cranial Nerves: No cranial nerve deficit. Sensory: No sensory deficit. Motor: No weakness. Gait: Gait abnormal (antalgic appearing gait). Psychiatric:         Behavior: Behavior normal.         Thought Content:  Thought content normal.         Judgment: Judgment normal.      Comments: PHQ-2/9 Depression Screening    Little interest or pleasure in doing things: 0 - not at all  Feeling down, depressed, or hopeless: 1 - several days  Trouble falling or staying asleep, or sleeping too much: 2 - more than   half the days  Feeling tired or having little energy: 0 - not at all  Poor appetite or overeatin - not at all  Feeling bad about yourself - or that you are a failure or have let   yourself or your family down: 0 - not at all  Trouble concentrating on things, such as reading the newspaper or watching   television: 1 - several days  Moving or speaking so slowly that other people could have noticed. Or the   opposite - being so fidgety or restless that you have been moving around a   lot more than usual: 0 - not at all  Thoughts that you would be better off dead, or of hurting yourself in some   way: 0 - not at all  PHQ-9 Score: 4   PHQ-9 Interpretation: No or Minimal depression         Patient's shoes and socks removed. Right Foot/Ankle   Right Foot Inspection  Skin Exam: skin normal and skin intact. No dry skin, no warmth, no callus, no erythema, no maceration, no abnormal color, no pre-ulcer, no ulcer and no callus. Toe Exam: ROM and strength within normal limits. Sensory   Vibration: intact  Monofilament testing: intact    Vascular  Capillary refills: < 3 seconds  The right DP pulse is 1+. Left Foot/Ankle  Left Foot Inspection  Skin Exam: skin normal and skin intact. No dry skin, no warmth, no erythema, no maceration, normal color, no pre-ulcer, no ulcer and no callus. Toe Exam: ROM and strength within normal limits. Sensory   Vibration: intact  Monofilament testing: intact    Vascular  Capillary refills: < 3 seconds  The left DP pulse is 1+.      Assign Risk Category  No deformity present  No loss of protective sensation  No weak pulses  Risk: 0    Sean Isidro MD

## 2023-08-15 ENCOUNTER — TELEPHONE (OUTPATIENT)
Dept: FAMILY MEDICINE CLINIC | Facility: CLINIC | Age: 78
End: 2023-08-15

## 2023-08-15 LAB — BACTERIA UR CULT: NORMAL

## 2023-08-15 NOTE — TELEPHONE ENCOUNTER
Message Left Via [place],     Called and left a message for a return call back. Hi, this is Akhil Diop. Could you please give me a call back?  Justin Higgins [phone] Thank you

## 2023-08-16 PROBLEM — G89.29 CHRONIC MIDLINE LOW BACK PAIN WITHOUT SCIATICA: Status: ACTIVE | Noted: 2023-02-27

## 2023-08-16 NOTE — ASSESSMENT & PLAN NOTE
Lab Results   Component Value Date    HGBA1C 9.0 (H) 08/14/2023   Pt had labs done this morning and results not available during visit. A1C later = 9.0, though pt has been on pred for her breathing. Continue lantus. Increase repaglinide to 1mg tid while on pred. Pt to monitor home BG and call Friday with results.  Recheck 1w if not improving

## 2023-08-16 NOTE — ASSESSMENT & PLAN NOTE
Pt just started on pred taper but has not noted any improvement yet. Continue to monitor.  F/u with pulm

## 2023-08-16 NOTE — ASSESSMENT & PLAN NOTE
I discussed with pt. No improvement with pred taper. Continue present care. Consider pain management eval. Recheck 3m - earlier if worse.

## 2023-08-21 ENCOUNTER — TELEPHONE (OUTPATIENT)
Dept: FAMILY MEDICINE CLINIC | Facility: CLINIC | Age: 78
End: 2023-08-21

## 2023-08-21 NOTE — TELEPHONE ENCOUNTER
Pt LM on outlook as an FYI to let you know she is currently in the [facility] so you can view her progress. We will need to do a TCm once she is discharged.

## 2023-08-25 DIAGNOSIS — G25.81 RESTLESS LEG: ICD-10-CM

## 2023-08-25 RX ORDER — PRAMIPEXOLE DIHYDROCHLORIDE 0.75 MG/1
0.75 TABLET ORAL EVERY EVENING
Qty: 30 TABLET | Refills: 5 | Status: SHIPPED | OUTPATIENT
Start: 2023-08-25

## 2023-08-27 ENCOUNTER — TELEPHONE (OUTPATIENT)
Dept: OTHER | Facility: OTHER | Age: 78
End: 2023-08-27

## 2023-08-28 NOTE — TELEPHONE ENCOUNTER
Patient was discharged from [facility] on 8/24/23. I don't see that a call was initiated the following day, but we are still within the 14 day period. Will this be considered hospital follow up now?

## 2023-08-28 NOTE — TELEPHONE ENCOUNTER
Patient was in the hospital SOB, body aches and cough. She is a COPD patient. Do you prefer to bring her in this week? Your next [facility] spot is not until 9/5 at 3 PM. Please advise.

## 2023-08-29 ENCOUNTER — TELEPHONE (OUTPATIENT)
Dept: FAMILY MEDICINE CLINIC | Facility: CLINIC | Age: 78
End: 2023-08-29

## 2023-08-29 ENCOUNTER — TRANSITIONAL CARE MANAGEMENT (OUTPATIENT)
Dept: FAMILY MEDICINE CLINIC | Facility: CLINIC | Age: 78
End: 2023-08-29

## 2023-08-30 ENCOUNTER — VBI (OUTPATIENT)
Dept: ADMINISTRATIVE | Facility: OTHER | Age: 78
End: 2023-08-30

## 2023-08-30 NOTE — TELEPHONE ENCOUNTER
08/30/23 1:06 PM    Patient contacted (left message) to bring Advance Directive, POLST, or Living Will document to next scheduled pcp visit. Thank you.   Kirill Saleh PG VAL[facility]ED [facility]

## 2023-08-31 DIAGNOSIS — F51.01 PRIMARY INSOMNIA: ICD-10-CM

## 2023-08-31 RX ORDER — HYDROXYZINE HYDROCHLORIDE 25 MG/1
TABLET, FILM COATED ORAL
Qty: 60 TABLET | Refills: 3 | Status: SHIPPED | OUTPATIENT
Start: 2023-08-31

## 2023-09-05 ENCOUNTER — OFFICE VISIT (OUTPATIENT)
Dept: FAMILY MEDICINE CLINIC | Facility: CLINIC | Age: 78
End: 2023-09-05
Payer: MEDICARE

## 2023-09-05 VITALS
HEIGHT: 60 IN | HEART RATE: 78 BPM | WEIGHT: 154.4 LBS | SYSTOLIC BLOOD PRESSURE: 134 MMHG | DIASTOLIC BLOOD PRESSURE: 80 MMHG | BODY MASS INDEX: 30.31 KG/M2 | OXYGEN SATURATION: 92 % | TEMPERATURE: 97.2 F

## 2023-09-05 DIAGNOSIS — Z76.89 ENCOUNTER FOR SUPPORT AND COORDINATION OF TRANSITION OF CARE: Primary | ICD-10-CM

## 2023-09-05 DIAGNOSIS — E11.65 TYPE 2 DIABETES MELLITUS WITH HYPERGLYCEMIA, WITH LONG-TERM CURRENT USE OF INSULIN (HCC): ICD-10-CM

## 2023-09-05 DIAGNOSIS — J44.1 COPD EXACERBATION (HCC): ICD-10-CM

## 2023-09-05 DIAGNOSIS — M54.50 CHRONIC MIDLINE LOW BACK PAIN WITHOUT SCIATICA: ICD-10-CM

## 2023-09-05 DIAGNOSIS — I50.22 CHRONIC HFREF (HEART FAILURE WITH REDUCED EJECTION FRACTION) (HCC): ICD-10-CM

## 2023-09-05 DIAGNOSIS — Z79.4 TYPE 2 DIABETES MELLITUS WITH HYPERGLYCEMIA, WITH LONG-TERM CURRENT USE OF INSULIN (HCC): ICD-10-CM

## 2023-09-05 DIAGNOSIS — J41.0 SIMPLE CHRONIC BRONCHITIS (HCC): ICD-10-CM

## 2023-09-05 DIAGNOSIS — G89.29 CHRONIC MIDLINE LOW BACK PAIN WITHOUT SCIATICA: ICD-10-CM

## 2023-09-05 PROBLEM — R09.02 HYPOXEMIA: Status: ACTIVE | Noted: 2023-08-29

## 2023-09-05 PROCEDURE — 99495 TRANSJ CARE MGMT MOD F2F 14D: CPT | Performed by: FAMILY MEDICINE

## 2023-09-05 RX ORDER — FUROSEMIDE 20 MG/1
20 TABLET ORAL DAILY
COMMUNITY
Start: 2023-08-23

## 2023-09-05 NOTE — PROGRESS NOTES
Assessment & Plan     1. Encounter for support and coordination of transition of care    2. COPD exacerbation ([address])    3. Simple chronic bronchitis ([address])    4. Chronic midline low back pain without sciatica    5. Chronic HFrEF (heart failure with reduced ejection fraction) ([facility])    6. Type 2 diabetes mellitus with hyperglycemia, with long-term current use of insulin ([facility])    Discussion: I reviewed all with patient and significant other  -In regards to her COPD exacerbation, this was secondary to parainfluenza infection. Patient with significant underlying COPD and is followed closely by [facility] pulmonology. Patient continues to refuse O2 but is using a nebulizer at home. Continue present care. Recheck 1 month  -Regards to her chronic low back pain with sciatica, secondary to spinal stenosis, patient to follow-up with surgery at [facility]. Continue arthritis pain as needed as directed. -In regards to her chronic heart failure with reduced ejection fraction, patient appears to be euvolemic at present. She will continue her present medications, follow-up with cardiology, and weigh herself daily. Patient will call if weight increases 2 to 3 pounds in 1 to 2 days or greater than 4 pounds in a week. -In regards to diabetes, blood sugars labile secondary to prednisone use. We will continue to monitor for now and adjust medications at goal.  Recheck 1 month    Patient will follow-up as above. Patient to call for problems or concerns in the interim       Subjective     Transitional Care Management Review:   Ericka Flores is a 68 y.o. female here for TCM follow up.      During the TCM phone call patient stated:  TCM Call     Date and time call was made  8/29/2023  1:14 PM    Hospital care reviewed  Records reviewed    Patient was hospitialized at  [facility]    Date of Admission  08/20/23    Date of discharge  08/24/23    Diagnosis  Acute on chronic systolic (congestive) heart failure ([address]) Disposition  Home    Were the patients medications reviewed and updated  Yes    Current Symptoms  Cough    Cough Severity  Moderate    Weakness severity  Severe    Back pain, left side, severity  Moderate    Back pain, left side, onset  Sudden    Back pain, right side, severity  Moderate    Back pain, right side, onset  Sudden      TCM Call     Post hospital issues  None    Should patient be enrolled in anticoag monitoring? No    Scheduled for follow up? Yes    Did you obtain your prescribed medications  Yes    Why were you unable to obtain your medications  going to doctor tomorrow    Do you need help managing your prescriptions or medications  No    Is transportation to your appointment needed  No    I have advised the patient to call PCP with any new or worsening symptoms  MAGAN Nunes    Living Arrangements  Spouse or Significiant other    Support System  Family    The type of support provided  Physical; Emotional    Are you recieving any outpatient services  No    Are you recieving home care services  Yes    Types of home care services  Nurse visit    Are you using any community resources  No    Current waiver services  No    Have you fallen in the last 12 months  No    Interperter language line needed  No        Pt here for TCM visit  - 69 yo female with hx of COPD, DMII, and CAD developed increased cough on 8/18/23. Pt had been noting increased SOB over the last 1-2m that did not respond well to treatment including steroids. The day before admission, she was seen as Urgent Care and started on Doxy and tessalon. Pt states that she worsened overnight, and went to [facility] ED on 8/19. In the ED, she had signs of URI with hypoxia. Pt had a mild leuokocytosis. Pt was started on cefepime, duo neb treatments, decadron and was subsequently admitted. In the hospital, pt improved. Parainfluenza testing was positive. Pt was found to be hyperglycemic due to decadron, and had insulin adjusted.  Pt also noted to have some signs of fluid overload and was given IV furosemide x2. Echo revealed EF= 45-50% with apical hypokinesis. Pt slowly improved. She qualified for O2 but refused. Pt was discharged on 8/24 with request to f/u with Natalie Pittman and this office. Pt here for TCM visit  - since discharge, pt has been feeling better. Pt was seen by Pulm who again recommended O2, though pt continues to refuse. Pt has a neb at home and has been using it 4x a day. Cough is much better. No worsening fever/chills. BGs have been very labile. Back pain persists. She is seeing a spine surgeon re: DDD and spinal stenosis. - I reviewed available hospital records, lab results and study reports with pt and her significant other        Review of Systems   Constitutional: Positive for fatigue. Negative for activity change, appetite change, chills and fever. HENT: Positive for congestion. Negative for ear discharge, ear pain, hearing loss, sinus pressure and sinus pain. Eyes: Negative. Respiratory: Positive for shortness of breath (mild with exertion). Negative for cough and wheezing. Cardiovascular: Negative. Gastrointestinal: Negative. Genitourinary: Negative. Musculoskeletal: Positive for arthralgias, back pain, gait problem, joint swelling and myalgias. Skin: Negative. Neurological: Negative for dizziness, weakness, light-headedness, numbness and headaches. Hematological: Negative. Psychiatric/Behavioral: Negative. Objective     /80 (BP Location: Left arm, Patient Position: Sitting, Cuff Size: Adult)   Pulse 78   Temp (!) 97.2 °F (36.2 °C)   Ht 5' (1.524 m)   Wt 70 kg (154 lb 6.4 oz)   SpO2 92%   BMI 30.15 kg/m²      Physical Exam  Vitals reviewed. HENT:      Head: Normocephalic. Mouth/Throat:      Mouth: Mucous membranes are moist.   Eyes:      Extraocular Movements: Extraocular movements intact.       Conjunctiva/sclera: Conjunctivae normal.      Pupils: Pupils are equal, round, and reactive to light. Cardiovascular:      Rate and Rhythm: Normal rate and regular rhythm. Pulses: Normal pulses. Pulmonary:      Effort: Pulmonary effort is normal.      Breath sounds: Normal breath sounds. Abdominal:      General: There is no distension. Palpations: There is no mass. Tenderness: There is no abdominal tenderness. Musculoskeletal:         General: Tenderness (along lumbar paraspinal muscles) and deformity (increased thoracic kyphosis) present. No swelling. Cervical back: No tenderness. Right lower leg: Edema (trace) present. Left lower leg: Edema (trace) present. Lymphadenopathy:      Cervical: No cervical adenopathy. Skin:     General: Skin is warm. Capillary Refill: Capillary refill takes less than 2 seconds. Neurological:      Mental Status: She is alert. Cranial Nerves: No cranial nerve deficit. Sensory: No sensory deficit. Motor: No weakness.    Psychiatric:         Mood and Affect: Mood normal.       Medications have been reviewed by provider in current encounter    Miguel Wang MD

## 2023-09-07 DIAGNOSIS — F41.9 ANXIETY: ICD-10-CM

## 2023-09-07 PROBLEM — Z87.81 HISTORY OF VERTEBRAL COMPRESSION FRACTURE: Status: ACTIVE | Noted: 2023-04-14

## 2023-09-07 RX ORDER — CHLORDIAZEPOXIDE HYDROCHLORIDE 5 MG/1
5 CAPSULE, GELATIN COATED ORAL 2 TIMES DAILY
Qty: 60 CAPSULE | Refills: 0 | Status: SHIPPED | OUTPATIENT
Start: 2023-09-07

## 2023-09-07 NOTE — TELEPHONE ENCOUNTER
1 [id number] 08/29/2023 08/29/2023 CODEINE PHOSPHATE/guaiFENesin (Solution) 240.0 12 2 MG/ML / 20 MG/ML 6.0 SYLVESTER OSCAR [employer] [facility] 0 / 1 [place]    1 [id number] 08/01/2023 08/01/2023 chlordiazePOXIDE HCL (Capsule) 60.0 30 5 MG NA DANNY CHOWDARY POGODZINSKI [employer] [place] 0 / 0 [place]    1 [id number] 07/03/2023 07/03/2023 chlordiazePOXIDE HCL (Capsule) 60.0 30 5 MG NA SARIKA REYES [employer]. [place] / 0 [place]    1 [id number] 06/01/2023 06/01/2023 chlordiazePOXIDE HCL (Capsule) 60.0 30 5 MG NA ELSPETH BLACK-CHU [employer]. [place] / 0 [place]    1 [id number] 05/02/2023 05/02/2023 chlordiazePOXIDE HCL (Capsule) 60.0 30 5 MG DANNY NARANJO [employer]OGOD[employer] [place] / 0 [place]    1 [id number] 03/27/2023 03/27/2023 chlordiazePOXIDE HCL (Capsule) 60.0 30 5 MG DANNY NARANJO [employer] [place] 0 / 0 [place]    1 [id number] 03/03/2023 03/03/2023 Acetaminophen 300 Mg / Codeine Phosphate 30 Mg Oral Tablet (Tablet) 30.0 7 30.0 MG/300.0 MG 19.29 DANNY CHOWDARY POGODZINSKI [employer] [facility] 0 / 0 [place]    1 [id number] 02/24/2023 02/24/2023 chlordiazePOXIDE HCL (Capsule) 60.0 30 5 MG DANNY NARANJO [employer] [place] 0 / 0 [place]    1 [id number] 01/25/2023 01/25/2023 chlordiazePOXIDE HCL (Capsule) 60.0 30 5 MG DANNY NARANJO POGODZINSKI [employer]. [place] / 0 [place]    1 [id number] 12/27/2022 12/23/2022 chlordiazePOXIDE HCL (Capsule) 60.0 30 5 MG NA SARIKASABINO REYES [facility] [employer]  Medicare

## 2023-09-19 ENCOUNTER — TELEPHONE (OUTPATIENT)
Age: 78
End: 2023-09-19

## 2023-09-19 NOTE — TELEPHONE ENCOUNTER
Patient called stating for the past three days she has had urine frequency, pressure with urination. She is asking if she can come in to give a urine sample. Please advise.

## 2023-09-19 NOTE — TELEPHONE ENCOUNTER
Patient called back, but wanted to be scheduled tomorrow.   I put her in for with the nurse  At 1:30 on 09/20

## 2023-09-20 ENCOUNTER — CLINICAL SUPPORT (OUTPATIENT)
Dept: FAMILY MEDICINE CLINIC | Facility: CLINIC | Age: 78
End: 2023-09-20
Payer: MEDICARE

## 2023-09-20 ENCOUNTER — TELEPHONE (OUTPATIENT)
Dept: FAMILY MEDICINE CLINIC | Facility: CLINIC | Age: 78
End: 2023-09-20

## 2023-09-20 DIAGNOSIS — R39.9 URINARY SYMPTOM OR SIGN: Primary | ICD-10-CM

## 2023-09-20 DIAGNOSIS — R30.0 DYSURIA: Primary | ICD-10-CM

## 2023-09-20 LAB
SL AMB  POCT GLUCOSE, UA: ABNORMAL
SL AMB LEUKOCYTE ESTERASE,UA: ABNORMAL
SL AMB POCT BILIRUBIN,UA: ABNORMAL
SL AMB POCT BLOOD,UA: ABNORMAL
SL AMB POCT CLARITY,UA: ABNORMAL
SL AMB POCT COLOR,UA: YELLOW
SL AMB POCT KETONES,UA: ABNORMAL
SL AMB POCT NITRITE,UA: ABNORMAL
SL AMB POCT PH,UA: 5.5
SL AMB POCT SPECIFIC GRAVITY,UA: 1.03
SL AMB POCT URINE PROTEIN: ABNORMAL
SL AMB POCT UROBILINOGEN: ABNORMAL

## 2023-09-20 PROCEDURE — 81003 URINALYSIS AUTO W/O SCOPE: CPT

## 2023-09-20 RX ORDER — SULFAMETHOXAZOLE AND TRIMETHOPRIM 800; 160 MG/1; MG/1
1 TABLET ORAL EVERY 12 HOURS SCHEDULED
Qty: 14 TABLET | Refills: 0 | Status: SHIPPED | OUTPATIENT
Start: 2023-09-20 | End: 2023-09-27

## 2023-09-20 NOTE — TELEPHONE ENCOUNTER
Per our conversation positive urine dip in office. Will give culture at lab tomorrow because she was unable to give enough urine to obtain culture. Lab slip provided, she said she will drop the specimen off tomorrow morning.

## 2023-09-21 ENCOUNTER — APPOINTMENT (OUTPATIENT)
Dept: LAB | Facility: CLINIC | Age: 78
End: 2023-09-21
Payer: MEDICARE

## 2023-09-21 DIAGNOSIS — R39.9 URINARY SYMPTOM OR SIGN: ICD-10-CM

## 2023-09-21 PROCEDURE — 87186 SC STD MICRODIL/AGAR DIL: CPT

## 2023-09-21 PROCEDURE — 87086 URINE CULTURE/COLONY COUNT: CPT

## 2023-09-21 PROCEDURE — 87077 CULTURE AEROBIC IDENTIFY: CPT

## 2023-09-23 LAB
BACTERIA UR CULT: ABNORMAL
BACTERIA UR CULT: ABNORMAL

## 2023-09-26 ENCOUNTER — TELEPHONE (OUTPATIENT)
Age: 78
End: 2023-09-26

## 2023-09-26 NOTE — TELEPHONE ENCOUNTER
Pt called back for her urine culture results. Providers result note given. Pt aware and understands. She is feeling fine.

## 2023-10-06 DIAGNOSIS — E11.9 TYPE 2 DIABETES MELLITUS WITHOUT COMPLICATION, WITHOUT LONG-TERM CURRENT USE OF INSULIN (HCC): ICD-10-CM

## 2023-10-06 RX ORDER — REPAGLINIDE 0.5 MG/1
0.5 TABLET ORAL
Qty: 90 TABLET | Refills: 1 | Status: SHIPPED | OUTPATIENT
Start: 2023-10-06

## 2023-10-07 DIAGNOSIS — F41.9 ANXIETY: ICD-10-CM

## 2023-10-09 RX ORDER — CHLORDIAZEPOXIDE HYDROCHLORIDE 5 MG/1
5 CAPSULE, GELATIN COATED ORAL 2 TIMES DAILY
Qty: 60 CAPSULE | Refills: 0 | Status: SHIPPED | OUTPATIENT
Start: 2023-10-09

## 2023-10-19 ENCOUNTER — TELEPHONE (OUTPATIENT)
Age: 78
End: 2023-10-19

## 2023-10-19 NOTE — TELEPHONE ENCOUNTER
Pt. Wanted me to change her pharmacy because [facility] is closing. Also, she wanted me to let Dr. Idelia Bamberger know that Park Anderson is doing chemo and he had his first treatment yesterday. He is not doing the surgery.

## 2023-10-20 ENCOUNTER — TELEPHONE (OUTPATIENT)
Age: 78
End: 2023-10-20

## 2023-10-20 NOTE — TELEPHONE ENCOUNTER
Pt wants to know what the name of the medication is that she uses in her Nebulizer. Please, call pt back to inform her. Thank you.

## 2023-10-20 NOTE — TELEPHONE ENCOUNTER
Patient's nebulizer medication is provided by her pulmonologist at [facility]- retrieved info and relayed to patient.

## 2023-10-25 DIAGNOSIS — R05.8 PRODUCTIVE COUGH: ICD-10-CM

## 2023-10-25 RX ORDER — ALBUTEROL SULFATE 90 UG/1
2 AEROSOL, METERED RESPIRATORY (INHALATION) EVERY 6 HOURS PRN
Qty: 6.7 G | Refills: 2 | Status: SHIPPED | OUTPATIENT
Start: 2023-10-25 | End: 2023-10-27 | Stop reason: SDUPTHER

## 2023-10-26 NOTE — TELEPHONE ENCOUNTER
Pt called in stating she received the generic brand of and she wants the name brand of albuterol.  She also says she was charged $20 and she normally pays $9.

## 2023-10-27 ENCOUNTER — TELEPHONE (OUTPATIENT)
Age: 78
End: 2023-10-27

## 2023-10-27 DIAGNOSIS — R05.8 PRODUCTIVE COUGH: ICD-10-CM

## 2023-10-27 DIAGNOSIS — I25.10 3-VESSEL CAD: Primary | ICD-10-CM

## 2023-10-27 RX ORDER — ALBUTEROL SULFATE 90 UG/1
2 AEROSOL, METERED RESPIRATORY (INHALATION) EVERY 6 HOURS PRN
Qty: 6.7 G | Refills: 2 | Status: SHIPPED | OUTPATIENT
Start: 2023-10-27

## 2023-10-27 RX ORDER — FUROSEMIDE 20 MG/1
20 TABLET ORAL DAILY
Qty: 90 TABLET | Refills: 1 | Status: SHIPPED | OUTPATIENT
Start: 2023-10-27

## 2023-10-27 NOTE — TELEPHONE ENCOUNTER
Patient calling today about her inhaler - she is asking for BRAND ventolin does not want the generic. Please advise.

## 2023-10-27 NOTE — TELEPHONE ENCOUNTER
Reason for call:   [x] Refill   [] Prior Auth  [] Other:     Office:   [x] PCP/Provider - Jonathan Barnes,   [] Specialty/Provider -     Medication: LASIX    Dose/Frequency: 20 MG    Quantity: 0    [facility]: [facility]    Does the patient have enough for 3 days?    [x] Yes   [] No - Send as HP to POD

## 2023-11-01 DIAGNOSIS — F51.01 PRIMARY INSOMNIA: ICD-10-CM

## 2023-11-01 RX ORDER — HYDROXYZINE HYDROCHLORIDE 25 MG/1
TABLET, FILM COATED ORAL
Qty: 60 TABLET | Refills: 3 | Status: SHIPPED | OUTPATIENT
Start: 2023-11-01

## 2023-11-03 ENCOUNTER — NURSE TRIAGE (OUTPATIENT)
Age: 78
End: 2023-11-03

## 2023-11-03 NOTE — TELEPHONE ENCOUNTER
Patient called in to report 2 occurrences of lightheadedness today. First was on awakening with charley horse pain in leg where she jumped out of bed and felt her body swaying; not vertigo or room spinning. Second this afternoon while playing cards, got up and she started swaying; scared to walk; spouse assisted her to sofa to sit. Symptom not duplicated when moving head left to right or with change of position. Patient feels it may be related to her anxiety or blood sugar >200 this morning. Reports low back pain and venous blockages in lower extremities. RN advised patient to be evaluated; patient declined and wants to wait for scheduled OV 11/20. Only wants to see PCP. Please follow up with patient for PCP advice and possible OV. RN did review precautions for ER visit over weekend if symptoms continue to occur, worsen, or possible fall with or without injury. . Patient verbalized understanding. Reason for Disposition   [1] MODERATE dizziness (e.g., interferes with normal activities) AND [2] has been evaluated by physician for this    Answer Assessment - Initial Assessment Questions  1. DESCRIPTION: "Describe your dizziness."      Stood up quickly due to charley horse this morning and patient was swaying  2. LIGHTHEADED: "Do you feel lightheaded?" (e.g., somewhat faint, woozy, weak upon standing)      Balance was off; couldn't walk 30 minutes ago; does not feel faint; scared to walk  3. VERTIGO: "Do you feel like either you or the room is spinning or tilting?" (i.e. vertigo)      Denies  4. SEVERITY: "How bad is it?"  "Do you feel like you are going to faint?" "Can you stand and walk?"    - MILD: Feels slightly dizzy, but walking normally. - MODERATE: Feels very unsteady when walking, but not falling; interferes with normal activities (e.g., school, work) . - SEVERE: Unable to walk without falling, or requires assistance to walk without falling; feels like passing out now.        Severe; scared to walk  5. ONSET:  "When did the dizziness begin?"      This morning  6. AGGRAVATING FACTORS: "Does anything make it worse?" (e.g., standing, change in head position)      Moving head left to right while sitting; neck is stiff and not dizzy  7. HEART RATE: "Can you tell me your heart rate?" "How many beats in 15 seconds?"  (Note: not all patients can do this)        Denies palpitaitons  8. CAUSE: "What do you think is causing the dizziness?"      Anxiety, blood sugar>200   9. RECURRENT SYMPTOM: "Have you had dizziness before?" If Yes, ask: "When was the last time?" "What happened that time?"      Denies  10. OTHER SYMPTOMS: "Do you have any other symptoms?" (e.g., fever, chest pain, vomiting, diarrhea, bleeding)        Low back pain and right leg with blockages  11.  PREGNANCY: "Is there any chance you are pregnant?" "When was your last menstrual period?"        Post menopausal    Protocols used: Dizziness - Lightheadedness-ADULT-

## 2023-11-03 NOTE — TELEPHONE ENCOUNTER
Regarding: Lightheaded  ----- Message from Dianelys Mccall sent at 11/3/2023  2:39 PM EDT -----  Patient is experiencing lightheadedness, she reports being off balance and swaying when she stands up, like the room is spinning. She is diabetic an also takes blood pressure medication.

## 2023-11-06 DIAGNOSIS — F41.9 ANXIETY: ICD-10-CM

## 2023-11-06 RX ORDER — CHLORDIAZEPOXIDE HYDROCHLORIDE 5 MG/1
5 CAPSULE, GELATIN COATED ORAL 2 TIMES DAILY
Qty: 60 CAPSULE | Refills: 0 | Status: SHIPPED | OUTPATIENT
Start: 2023-11-06

## 2023-11-06 NOTE — TELEPHONE ENCOUNTER
Patient called to check on the status of refill. She is out of the medication and would like it sent to her pharmacy asap.  I confirmed to her that its is pending and the correct pharmacy is attached to the refill

## 2023-11-06 NOTE — TELEPHONE ENCOUNTER
Reason for call:   [x] Refill   [] Prior Auth  [] Other:     Office:   [x] PCP/Provider - Markell Quispe MD   [] Specialty/Provider -     Medication:     chlordiazePOXIDE (LIBRIUM)       Dose/Frequency:     5 mg, Oral, 2 times daily       Quantity: 60    Pharmacy:  [address] [place] [address] [id number] Damaso Serra [address] [place] [address] RD     Does the patient have enough for 3 days?    [x] Yes   [] No - Send as HP to POD

## 2023-11-10 NOTE — TELEPHONE ENCOUNTER
Left another message, patient is not returning calls.  She does have an appt to f/u here on 11/20/23

## 2023-11-15 ENCOUNTER — VBI (OUTPATIENT)
Dept: ADMINISTRATIVE | Facility: OTHER | Age: 78
End: 2023-11-15

## 2023-11-20 ENCOUNTER — OFFICE VISIT (OUTPATIENT)
Dept: FAMILY MEDICINE CLINIC | Facility: CLINIC | Age: 78
End: 2023-11-20
Payer: MEDICARE

## 2023-11-20 VITALS
DIASTOLIC BLOOD PRESSURE: 80 MMHG | BODY MASS INDEX: 29.88 KG/M2 | OXYGEN SATURATION: 94 % | TEMPERATURE: 97.3 F | HEIGHT: 60 IN | SYSTOLIC BLOOD PRESSURE: 124 MMHG | WEIGHT: 152.2 LBS | HEART RATE: 80 BPM

## 2023-11-20 DIAGNOSIS — I10 PRIMARY HYPERTENSION: ICD-10-CM

## 2023-11-20 DIAGNOSIS — E11.65 TYPE 2 DIABETES MELLITUS WITH HYPERGLYCEMIA, WITH LONG-TERM CURRENT USE OF INSULIN (HCC): Primary | ICD-10-CM

## 2023-11-20 DIAGNOSIS — I25.10 3-VESSEL CAD: ICD-10-CM

## 2023-11-20 DIAGNOSIS — Z79.4 TYPE 2 DIABETES MELLITUS WITH HYPERGLYCEMIA, WITH LONG-TERM CURRENT USE OF INSULIN (HCC): Primary | ICD-10-CM

## 2023-11-20 DIAGNOSIS — I44.2 HEART BLOCK AV COMPLETE (HCC): ICD-10-CM

## 2023-11-20 DIAGNOSIS — I73.9 PERIPHERAL ARTERIAL DISEASE (HCC): ICD-10-CM

## 2023-11-20 DIAGNOSIS — J41.0 SIMPLE CHRONIC BRONCHITIS (HCC): ICD-10-CM

## 2023-11-20 PROBLEM — J44.1 COPD EXACERBATION (HCC): Status: RESOLVED | Noted: 2023-08-20 | Resolved: 2023-11-20

## 2023-11-20 LAB — SL AMB POCT HEMOGLOBIN AIC: 9.7 (ref ?–6.5)

## 2023-11-20 PROCEDURE — 83036 HEMOGLOBIN GLYCOSYLATED A1C: CPT | Performed by: FAMILY MEDICINE

## 2023-11-20 PROCEDURE — 99214 OFFICE O/P EST MOD 30 MIN: CPT | Performed by: FAMILY MEDICINE

## 2023-11-20 RX ORDER — IPRATROPIUM BROMIDE AND ALBUTEROL SULFATE 2.5; .5 MG/3ML; MG/3ML
3 SOLUTION RESPIRATORY (INHALATION) 4 TIMES DAILY PRN
COMMUNITY
Start: 2023-10-20 | End: 2024-10-19

## 2023-11-20 RX ORDER — GLIPIZIDE 5 MG/1
5 TABLET, FILM COATED, EXTENDED RELEASE ORAL DAILY
Qty: 30 TABLET | Refills: 5 | Status: SHIPPED | OUTPATIENT
Start: 2023-11-20

## 2023-11-20 RX ORDER — POLYETHYLENE GLYCOL 3350 17 G/17G
17 POWDER, FOR SOLUTION ORAL DAILY PRN
COMMUNITY
Start: 2023-08-24

## 2023-11-20 NOTE — PROGRESS NOTES
Name: Alberto Carrillo      : 1945      MRN: [id number]  Encounter Provider: Joshua Adames MD  Encounter Date: 2023   Encounter department: [address] [place] [address]     1. Type 2 diabetes mellitus with hyperglycemia, with long-term current use of insulin [facility])  Assessment & Plan:    Lab Results   Component Value Date    HGBA1C 9.7 (A) 2023   I reviewed with pt. Pt frequently forgets to take repaglinide. REC: change med to Glucotrol XL 5mg qd. Continue Lantus. Recheck 3m    Orders:  -     glipiZIDE (GLUCOTROL XL) 5 mg 24 hr tablet; Take 1 tablet (5 mg total) by mouth daily  -     POCT hemoglobin A1c    2. Simple chronic bronchitis ([address])  Assessment & Plan:  I reviewed with pt. Breathing stable. Continue present care. Recheck 3m      3. 3-vessel CAD  Assessment & Plan:  Asymptomatic. Continue present care. Monitor labs. F/u with Cardio. Recheck 3m      4. Primary hypertension  Assessment & Plan:  Well controlled. Cont present treatment. Monitor labs. Recheck 6m        5. Peripheral arterial disease ([address])  Assessment & Plan:  I reviewed with pt. ?claudication worsening. Await CT angiogram results. F/u with vascular surgery      6. Heart block AV complete [facility])  Assessment & Plan:  S/p pacemaker placement. Continue present care. Recheck 3m             Subjective     f/u multiple med issues  - pt notes increasing right calf and ankle discomfort with ambulation. Patient with known history of PAD and was scheduled for follow-up with vascular surgery next year, but on complaining to them, she is now scheduled for repeat CT scan (CTA) of the right lower leg tomorrow. - pt states she has been noting increased BG lately. She states that it occurs every year around now. A1C in the office = 9.7. Pt admits that she frequently forgets to take her repaglinide.   - pt is up to date with Cardio.  Pt denies CP, palpitations, lightheadedness or other CV symptoms with or without exertion. Pt does not remember being on Repatha in the past and no longer takes it. - pt denies any new GI or  complaints          Review of Systems   Constitutional:  Positive for fatigue. Negative for activity change and appetite change. HENT: Negative. Eyes: Negative. Respiratory: Negative. Cardiovascular: Negative. Gastrointestinal: Negative. Endocrine: Negative. Genitourinary: Negative. Musculoskeletal:  Positive for arthralgias, back pain and myalgias. Skin: Negative. Neurological:  Negative for dizziness, weakness, light-headedness, numbness and headaches. Past Medical History:   Diagnosis Date   • Gastroenteritis      Past Surgical History:   Procedure Laterality Date   • ATRIAL CARDIAC PACEMAKER INSERTION       Family History   Problem Relation Age of Onset   • Diabetes Sister    • Dementia Sister      Social History     Socioeconomic History   • Marital status:      Spouse name: None   • Number of children: None   • Years of education: None   • Highest education level: None   Occupational History   • None   Tobacco Use   • Smoking status: Former   • Smokeless tobacco: Never   Substance and Sexual Activity   • Alcohol use: Not Currently   • Drug use: Never   • Sexual activity: None   Other Topics Concern   • None   Social History Narrative   • None     Social Determinants of Health     Financial Resource Strain: Low Risk  (8/14/2023)    Overall Financial Resource Strain (CARDIA)    • Difficulty of Paying Living Expenses: Not hard at all   Food Insecurity: Not on file   Transportation Needs: No Transportation Needs (8/14/2023)    PRAPARE - Transportation    • Lack of Transportation (Medical): No    • Lack of Transportation (Non-Medical):  No   Physical Activity: Not on file   Stress: Not on file   Social Connections: Not on file   Intimate Partner Violence: Not on file   Housing Stability: Not on file     Current Outpatient Medications on File Prior to Visit   Medication Sig   • Acetaminophen (TYLENOL ARTHRITIS PAIN PO) Take by mouth   • albuterol (Proventil HFA) 90 mcg/act inhaler Inhale 2 puffs every 6 (six) hours as needed for wheezing   • ascorbic acid (VITAMIN C) 500 mg tablet Take by mouth daily   • aspirin (ECOTRIN LOW STRENGTH) 81 mg EC tablet Take 81 mg by mouth daily   • atenolol (TENORMIN) 25 mg tablet Take 1 tablet (25 mg total) by mouth daily   • Blood Glucose Monitoring Suppl (Contour Next Monitor) w/Device KIT Use bid as directed   • chlordiazePOXIDE (LIBRIUM) 5 mg capsule Take 1 capsule (5 mg total) by mouth 2 (two) times a day   • Droplet Pen Needles 32G X 4 MM MISC Inject under the skin in the morning   • ezetimibe (ZETIA) 10 mg tablet Take 10 mg by mouth daily   • fluticasone-umeclidinium-vilanterol 200-62.5-25 mcg/actuation AEPB inhaler Inhale 1 puff daily Rinse mouth after use.    • furosemide (LASIX) 20 mg tablet Take 1 tablet (20 mg total) by mouth daily   • gabapentin (NEURONTIN) 100 mg capsule Take 1 capsule (100 mg total) by mouth 3 (three) times a day   • glucose blood (Contour Next Test) test strip Patient tests blood sugars twice daily   • hydrOXYzine HCL (ATARAX) 25 mg tablet TAKE 1 TO 2 TABLETS BY MOUTH AT BEDTIME AS NEEDED FOR INSOMNIA   • imipramine (TOFRANIL) 25 mg tablet 1 tab in the morning and 2 tab qHS   • Insulin Glargine Solostar (Lantus SoloStar) 100 UNIT/ML SOPN Inject 0.4 mL (40 Units total) under the skin in the morning   • ipratropium-albuterol (DUO-NEB) 0.5-2.5 mg/3 mL nebulizer solution Inhale 3 mL 4 (four) times a day as needed   • losartan (COZAAR) 50 mg tablet Take 50 mg by mouth 2 (two) times a day   • Microlet Lancets MISC Patient tests blood glucose twice daily   • pantoprazole (PROTONIX) 40 mg tablet take 1 tablet by mouth once daily   • pramipexole (MIRAPEX) 0.75 MG tablet take 1 tablet by mouth every evening   • Vascepa 1 g CAPS Take 2 capsules by mouth 2 (two) times a day   • Vitamin E 400 units TABS Take 2 tablets by mouth daily   • [DISCONTINUED] repaglinide (PRANDIN) 0.5 mg tablet take 1 tablet by mouth three times a day before meals   • Evolocumab 140 MG/ML SOAJ Inject 140 mg under the skin every 14 (fourteen) days (Patient not taking: Reported on 11/20/2023)   • polyethylene glycol (GLYCOLAX) 17 GM/SCOOP powder Take 17 g by mouth daily as needed (Patient not taking: Reported on 11/20/2023)   • triamcinolone (KENALOG) 0.1 % cream Apply topically 2 (two) times a day (Patient not taking: Reported on 8/14/2023)     Allergies   Allergen Reactions   • Atorvastatin      Reaction Date: 23Dec2011; Unknown reaction  LFT increased   • Clarithromycin      Reaction Date: 23Dec2011;   Patient does not recall reaction   • Hydrogen Peroxide Swelling     Reaction Date: 23Dec2011;   Swelling in the mouth   • Levofloxacin      Patient does not recall her reaction   • Other Swelling     Perioxol (mouth rinse)  Perioxol (mouth rinse)   • Penicillins Hives and Swelling   • Rosuvastatin      Reaction Date: 23Dec2011; Unknown reaction per patient  Increase LFT     Immunization History   Administered Date(s) Administered   • COVID-19 PFIZER VACCINE 0.3 ML IM 02/17/2021, 03/10/2021, 12/09/2021   • Tuberculin Skin Test-PPD Intradermal 04/14/2008       Objective     /80   Pulse 80   Temp (!) 97.3 °F (36.3 °C)   Ht 5' (1.524 m)   Wt 69 kg (152 lb 3.2 oz)   SpO2 94%   BMI 29.72 kg/m²     Physical Exam  Vitals reviewed. HENT:      Head: Normocephalic. Nose: No congestion. Mouth/Throat:      Mouth: Mucous membranes are moist.   Eyes:      Extraocular Movements: Extraocular movements intact. Conjunctiva/sclera: Conjunctivae normal.      Pupils: Pupils are equal, round, and reactive to light. Cardiovascular:      Rate and Rhythm: Normal rate and regular rhythm. Comments: Unable to appreciated lower leg/ankle/foot pulses  Pulmonary:      Effort: Pulmonary effort is normal.      Breath sounds:  No wheezing or rales.   Abdominal:      General: There is no distension. Palpations: There is no mass. Tenderness: There is no abdominal tenderness. Musculoskeletal:         General: Deformity (increased thoracic kyphosis) present. No tenderness. Cervical back: Normal range of motion. Right lower leg: No edema. Left lower leg: No edema. Skin:     General: Skin is warm. Capillary Refill: Capillary refill takes less than 2 seconds. Neurological:      Mental Status: She is alert. Cranial Nerves: No cranial nerve deficit. Sensory: No sensory deficit. Motor: No weakness.        Maricruz Chávez MD

## 2023-11-21 NOTE — ASSESSMENT & PLAN NOTE
Lab Results   Component Value Date    HGBA1C 9.7 (A) 11/20/2023   I reviewed with pt. Pt frequently forgets to take repaglinide. REC: change med to Glucotrol XL 5mg qd. Continue Lantus.  Recheck 3m

## 2023-12-06 DIAGNOSIS — F41.9 ANXIETY: ICD-10-CM

## 2023-12-06 RX ORDER — CHLORDIAZEPOXIDE HYDROCHLORIDE 5 MG/1
5 CAPSULE, GELATIN COATED ORAL 2 TIMES DAILY
Qty: 60 CAPSULE | Refills: 0 | Status: SHIPPED | OUTPATIENT
Start: 2023-12-06

## 2023-12-06 NOTE — TELEPHONE ENCOUNTER
1 [id number] 11/07/2023 11/06/2023 chlordiazePOXIDE HCL (Capsule) 60.0 30 5 MG NA DANNY CHOWDARY POGODZINSKI [employer] [place] 0 / 0 [place]    2 [id number] 10/09/2023 10/09/2023 chlordiazePOXIDE HCL (Capsule) 60.0 30 5 MG NA DANNY CHOWDARY POGODZINSKI [employer] [place] 0 / 0 [place]    2 [id number] 09/08/2023 09/07/2023 chlordiazePOXIDE HCL (Capsule) 60.0 30 5 MG NA DANNY CHOWDARY POGODZINSKI [employer] [place] 0 / 0 [place]    2 [id number] 08/29/2023 08/29/2023 CODEINE PHOSPHATE/guaiFENesin (Solution) 240.0 12 2 MG/ML / 20 MG/ML 6.0 SYLVESTER OSCAR [employer] [place]. [facility] 0 / 1 [address]    2 [id number] 08/01/2023 08/01/2023 chlordiazePOXIDE HCL (Capsule) 60.0 30 5 MG NA DANNY CHOWDARY POGODZINSKI [employer] [place] 0 / 0 [place]    2 [id number] 07/03/2023 07/03/2023 chlordiazePOXIDE HCL (Capsule) 60.0 30 5 MG NA SARIKA REYES [employer] [place] 0 / 0 [place]    2 [id number] 06/01/2023 06/01/2023 chlordiazePOXIDE HCL (Capsule) 60.0 30 5 MG NA ELSPETH BLACK-CHU [employer] [place] 0 / 0 [place]    2 [id number] 05/02/2023 05/02/2023 chlordiazePOXIDE HCL (Capsule) 60.0 30 5 MG DANNY NARANJO POGODZINSKI [employer] [place] 0 / 0 [place]    2 [id number] 03/27/2023 03/27/2023 chlordiazePOXIDE HCL (Capsule) 60.0 30 5 MG DANNY NARANJO POGODZINSKI [employer] [place] 0 / 0 [place]    2 [id number] 03/03/2023 03/03/2023 Acetaminophen 300 Mg / Codeine Phosphate 30 Mg Oral Tablet (Tablet) 30.0 7 30.0 MG/300.0 MG 19.29 ADNNY CHOWDARY, YASMINE [facility]  [place]

## 2023-12-06 NOTE — TELEPHONE ENCOUNTER
Reason for call:   [x] Refill   [] Prior Auth  [] Other:     Office:   [x] PCP/Provider -   [] Specialty/Provider -     Medication:     Dose/Frequency: chlordiazePOXIDE (LIBRIUM) 5 mg capsule #60    Ph[facility]    Does the patient have enough for 3 days?    [x] Yes   [] No - Send as HP to POD

## 2023-12-08 ENCOUNTER — TELEPHONE (OUTPATIENT)
Age: 78
End: 2023-12-08

## 2023-12-08 NOTE — TELEPHONE ENCOUNTER
Pt was calling in with issues with the Glipizide (Glucotrol XL) 5 mg, stating that her sugar have been really high. Pt stated that she barely ate anything yesterday and her sugar level were 260, and today all she had was cheerios and it reached 388. Pt mentioned the medication she was taking three times a day was working a lot better for her, she just would forget to take it sometimes. Pt would like to know what Dr. Stephany Sever would recommend for her to do. Please advise with the pt thank you.

## 2023-12-12 ENCOUNTER — TELEPHONE (OUTPATIENT)
Age: 78
End: 2023-12-12

## 2023-12-12 DIAGNOSIS — E11.65 TYPE 2 DIABETES MELLITUS WITH HYPERGLYCEMIA, WITH LONG-TERM CURRENT USE OF INSULIN (HCC): Primary | ICD-10-CM

## 2023-12-12 DIAGNOSIS — Z79.4 TYPE 2 DIABETES MELLITUS WITH HYPERGLYCEMIA, WITH LONG-TERM CURRENT USE OF INSULIN (HCC): Primary | ICD-10-CM

## 2023-12-12 RX ORDER — REPAGLINIDE 1 MG/1
1 TABLET ORAL
Qty: 90 TABLET | Refills: 5 | Status: SHIPPED | OUTPATIENT
Start: 2023-12-12

## 2023-12-12 NOTE — TELEPHONE ENCOUNTER
Sonam Arredondo called to say she checked with [facility] and the repagline was not called in yet.  PT requesting to please call in repagline 5 mg 1 before each meal to   [address] Damaso Baird [address] [place] [address] Street

## 2023-12-12 NOTE — TELEPHONE ENCOUNTER
Stephanie Cantu called in, she says the glipizide 5 mg isn't helping and her sugars are still pretty high.  She's wondering if she can go back on the repaglinide 0.5 mg. Please advise

## 2023-12-12 NOTE — TELEPHONE ENCOUNTER
Lm for patient to call back. There are now 2 messages regarding this medication.  Please transfer patient inside

## 2023-12-17 ENCOUNTER — VBI (OUTPATIENT)
Dept: ADMINISTRATIVE | Facility: OTHER | Age: 78
End: 2023-12-17

## 2023-12-20 DIAGNOSIS — Z79.4 TYPE 2 DIABETES MELLITUS WITHOUT COMPLICATION, WITH LONG-TERM CURRENT USE OF INSULIN (HCC): ICD-10-CM

## 2023-12-20 DIAGNOSIS — E11.9 TYPE 2 DIABETES MELLITUS WITHOUT COMPLICATION, WITHOUT LONG-TERM CURRENT USE OF INSULIN (HCC): ICD-10-CM

## 2023-12-20 DIAGNOSIS — E11.65 TYPE 2 DIABETES MELLITUS WITH HYPERGLYCEMIA, WITH LONG-TERM CURRENT USE OF INSULIN (HCC): ICD-10-CM

## 2023-12-20 DIAGNOSIS — E11.9 TYPE 2 DIABETES MELLITUS WITHOUT COMPLICATION, WITH LONG-TERM CURRENT USE OF INSULIN (HCC): ICD-10-CM

## 2023-12-20 DIAGNOSIS — Z79.4 TYPE 2 DIABETES MELLITUS WITH HYPERGLYCEMIA, WITH LONG-TERM CURRENT USE OF INSULIN (HCC): ICD-10-CM

## 2023-12-20 RX ORDER — PEN NEEDLE, DIABETIC 32GX 5/32"
NEEDLE, DISPOSABLE MISCELLANEOUS DAILY
Qty: 100 EACH | Refills: 1 | Status: SHIPPED | OUTPATIENT
Start: 2023-12-20

## 2023-12-20 RX ORDER — PERPHENAZINE 16 MG/1
TABLET, FILM COATED ORAL
Qty: 200 EACH | Refills: 1 | Status: SHIPPED | OUTPATIENT
Start: 2023-12-20

## 2023-12-20 RX ORDER — LANCETS
EACH MISCELLANEOUS
Qty: 200 EACH | Refills: 1 | Status: SHIPPED | OUTPATIENT
Start: 2023-12-20

## 2023-12-20 NOTE — TELEPHONE ENCOUNTER
Reason for call:   [x] Refill   [] Prior Auth  [] Other:     Office: jose saez  [x] PCP/Provider - louis   [] Specialty/Provider -     Medication: contour test strips, microlet lancets, droplet pen needles     Dose/Frequency: 32G x 4 mm/ test twice daily, test twice daily, daily     Quantity: 90 day supply     Pharmacy: [facility]     Does the patient have enough for 3 days?   [x] Yes   [] No - Send as HP to POD

## 2023-12-24 DIAGNOSIS — E11.9 TYPE 2 DIABETES MELLITUS WITHOUT COMPLICATION, WITHOUT LONG-TERM CURRENT USE OF INSULIN (HCC): ICD-10-CM

## 2023-12-26 RX ORDER — INSULIN GLARGINE 100 [IU]/ML
INJECTION, SOLUTION SUBCUTANEOUS
Qty: 42 ML | Refills: 0 | Status: SHIPPED | OUTPATIENT
Start: 2023-12-26

## 2024-01-09 DIAGNOSIS — Z79.4 TYPE 2 DIABETES MELLITUS WITH HYPERGLYCEMIA, WITH LONG-TERM CURRENT USE OF INSULIN (HCC): ICD-10-CM

## 2024-01-09 DIAGNOSIS — F41.9 ANXIETY: ICD-10-CM

## 2024-01-09 DIAGNOSIS — E11.65 TYPE 2 DIABETES MELLITUS WITH HYPERGLYCEMIA, WITH LONG-TERM CURRENT USE OF INSULIN (HCC): ICD-10-CM

## 2024-01-09 RX ORDER — PEN NEEDLE, DIABETIC 32GX 5/32"
NEEDLE, DISPOSABLE MISCELLANEOUS DAILY
Qty: 100 EACH | Refills: 1 | Status: SHIPPED | OUTPATIENT
Start: 2024-01-09

## 2024-01-09 NOTE — TELEPHONE ENCOUNTER
1 [id number] 12/09/2023 12/06/2023 chlordiazePOXIDE HCL (Capsule) 60.0 30 5 MG NA DANNY CHOWDARY JOSE CARLOS [employer] [place] 0 / 0 [place]    1 [id number] 11/07/2023 11/06/2023 chlordiazePOXIDE HCL (Capsule) 60.0 30 5 MG NA DANNY CHOWDARY POGODZINSKI [employer] [facility] [place] 0 / 0 [place]    2 [id number] 10/09/2023 10/09/2023 chlordiazePOXIDE HCL (Capsule) 60.0 30 5 MG NA DANNY CHOWDARY [employer] [place] 0 / 0 [place]    2 [id number] 09/08/2023 09/07/2023 chlordiazePOXIDE HCL (Capsule) 60.0 30 5 MG NA DANNY CHOWDARY POGODZINSKI [employer] [place] 0 / 0 [place]    2 [id number] 08/29/2023 08/29/2023 CODEINE PHOSPHATE/guaiFENesin (Solution) 240.0 12 2 MG/ML / 20 MG/ML 6.0 SYLVESTER OSCAR [employer] Commercial Insurance 0 / 1 [place]    2 [id number] 08/01/2023 08/01/2023 chlordiazePOXIDE HCL (Capsule) 60.0 30 5 MG NA DANNY CHOWDARY POGODZINSKI [employer] [place] 0 / 0 [place]    2 [id number] 07/03/2023 07/03/2023 chlordiazePOXIDE HCL (Capsule) 60.0 30 5 MG NA SARIKA REYES [employer] [place] 0 / 0 [place]    2 [id number] 06/01/2023 06/01/2023 chlordiazePOXIDE HCL (Capsule) 60.0 30 5 MG NA ELSPETH BLACK-CHU [facility] [employer] [place] 0 / 0 [place]    2 [id number] 05/02/2023 05/02/2023 chlordiazePOXIDE HCL (Capsule) 60.0 30 5 MG NA DANNY CHOWDARY POGODZINSKI [employer] [place] 0 / 0 [place]    2 [id number] 03/27/2023 03/27/2023 chlordiazePOXIDE HCL (Capsule) 60.0 30 5 MG NA DANNY CHOWDARY POGODZINSKI [facility] [place] 0 / 0 [place]

## 2024-01-09 NOTE — TELEPHONE ENCOUNTER
Reason for call:   [x] Refill   [] Prior Auth  [] Other:     Office:   [x] PCP/Provider - Dr Pope  [] Specialty/Provider -     Medication: Pen Needles / chlordiazepoxide    Dose/Frequency: 18Mp0LR daily / 5 mg BID    Quantity: 90D supply w refill     Pharmacy: [facility] [address] on file    Does the patient have enough for 3 days?   [] Yes   [x] No - Send as HP to POD

## 2024-01-10 RX ORDER — CHLORDIAZEPOXIDE HYDROCHLORIDE 5 MG/1
5 CAPSULE, GELATIN COATED ORAL 2 TIMES DAILY
Qty: 180 CAPSULE | Refills: 1 | Status: SHIPPED | OUTPATIENT
Start: 2024-01-10

## 2024-01-11 DIAGNOSIS — I25.10 3-VESSEL CAD: Primary | ICD-10-CM

## 2024-01-11 DIAGNOSIS — E78.2 MIXED HYPERLIPIDEMIA: ICD-10-CM

## 2024-01-11 NOTE — TELEPHONE ENCOUNTER
Patient called for Ezetimibe 10 mg refill, open encounter in chart has no medication left. Please send prescription to [facility] include [facility] beds at the [facility] [place] [phone]

## 2024-01-12 RX ORDER — EZETIMIBE 10 MG/1
10 TABLET ORAL DAILY
Qty: 30 TABLET | Refills: 5 | Status: SHIPPED | OUTPATIENT
Start: 2024-01-12

## 2024-01-15 ENCOUNTER — TELEPHONE (OUTPATIENT)
Age: 79
End: 2024-01-15

## 2024-01-15 ENCOUNTER — TELEPHONE (OUTPATIENT)
Dept: OTHER | Facility: OTHER | Age: 79
End: 2024-01-15

## 2024-01-15 RX ORDER — POLYETHYLENE GLYCOL 3350 17 G/17G
17 POWDER, FOR SOLUTION ORAL DAILY PRN
Status: CANCELLED | OUTPATIENT
Start: 2024-01-15

## 2024-01-15 NOTE — TELEPHONE ENCOUNTER
Spoke with patient, patient is requesting to get a cough medication for a cough. Also patient would like to speak to someone in regards to the medication Polyethylene Glycol. Please advise.

## 2024-01-15 NOTE — TELEPHONE ENCOUNTER
Patient is calling regarding cancelling an appointment.    Date/Time: 01/16/2024 11am    Patient was rescheduled: YES [] NO [x]    Patient requesting call back to reschedule: YES [] NO [x]    Patient tested positive for Covid

## 2024-01-16 NOTE — TELEPHONE ENCOUNTER
Patient didn't want to wait, was seen at [facility] walk-in yesterday. Patient was tested for Covid/Flu, Covid positive. Was prescribe Zofran, Lagervrio and promethazine. Patient not feeling good, upset stomach, cough and fatigue since Saturday. Denies SOB and fever.

## 2024-01-18 DIAGNOSIS — E11.9 TYPE 2 DIABETES MELLITUS WITHOUT COMPLICATION, WITHOUT LONG-TERM CURRENT USE OF INSULIN (HCC): ICD-10-CM

## 2024-01-18 RX ORDER — INSULIN GLARGINE 100 [IU]/ML
40 INJECTION, SOLUTION SUBCUTANEOUS DAILY
Qty: 42 ML | Refills: 3 | Status: SHIPPED | OUTPATIENT
Start: 2024-01-18

## 2024-01-18 RX ORDER — LANCETS
EACH MISCELLANEOUS
Qty: 200 EACH | Refills: 0 | Status: CANCELLED | OUTPATIENT
Start: 2024-01-18

## 2024-01-18 NOTE — TELEPHONE ENCOUNTER
Reason for call:   [x] Refill   [] Prior Auth  [x] Other: patient has 4 pens left but is just calling because there are no more refills left.    Office:   [x] PCP/Provider - Toshia   [] Specialty/Provider -     Medication: Lantus Solostar     Dose/Frequency: 100units/ML - Inject 40 units under the skin in morning #42 ml       Pharmacy: [facility] [address]     Does the patient have enough for 3 days?   [x] Yes   [] No - Send as HP to POD

## 2024-01-19 ENCOUNTER — TELEPHONE (OUTPATIENT)
Age: 79
End: 2024-01-19

## 2024-01-19 NOTE — TELEPHONE ENCOUNTER
Please let me know if this is received in the mail, I am unsure how to help her if I don't have a form and no explanation of exactly what is needed.

## 2024-01-19 NOTE — TELEPHONE ENCOUNTER
I don't and patient isn't providing info accordingly and is unsure exactly the form stated. She just states she mailed the form. I do not even see this med, where you going to prescribe it?

## 2024-01-19 NOTE — TELEPHONE ENCOUNTER
Spoke with Clinical and confirmed nothing was in chart and that patient is not even on that medication.  Was told to send this through to

## 2024-01-19 NOTE — TELEPHONE ENCOUNTER
Patient called stating two months ago, she mailed in a form for [employer] for PCP to look at. She is asking if office has received form. Spoke with Shanae, she states it is not in the front filing, or in the patient's media. Please review with  and advise for Jayshree.

## 2024-01-23 DIAGNOSIS — K59.00 CONSTIPATION, UNSPECIFIED CONSTIPATION TYPE: Primary | ICD-10-CM

## 2024-01-23 DIAGNOSIS — K59.00 CONSTIPATION, UNSPECIFIED CONSTIPATION TYPE: ICD-10-CM

## 2024-01-23 RX ORDER — POLYETHYLENE GLYCOL 3350 17 G/17G
17 POWDER, FOR SOLUTION ORAL DAILY PRN
Qty: 510 G | Refills: 0 | Status: SHIPPED | OUTPATIENT
Start: 2024-01-23

## 2024-01-23 RX ORDER — POLYETHYLENE GLYCOL 3350 17 G/17G
17 POWDER, FOR SOLUTION ORAL DAILY PRN
Qty: 510 G | Refills: 5 | Status: SHIPPED | OUTPATIENT
Start: 2024-01-23 | End: 2024-01-23 | Stop reason: SDUPTHER

## 2024-01-23 NOTE — TELEPHONE ENCOUNTER
Reason for call:   [x] Refill   [] Prior Auth  [] Other:     Office:   [x] PCP/Provider -   [] Specialty/Provider -     Medication: polyethylene glycol (MIRALAX) 17 g packet [[id number]]    Order Details  Dose: 17 g Route: Oral Frequency: Daily   Dispense Quantity: 510 g Refills: 1    Note to Pharmacy: Disp #30 packets         Sig: Take 17 g by mouth daily           [facility] [place]    Does the patient have enough for 3 days?   [] Yes   [x] No - Send as HP to POD

## 2024-01-23 NOTE — TELEPHONE ENCOUNTER
[facility]. She spoke with Jo and they said they do. She is requesting this medication to be sent to [facility] again.    Reason for call:   [x] Refill   [] Prior Auth  [] Other:     Office:   [x] PCP/Provider -   [] Specialty/Provider -     Medication: Glycolax 17mg/scoop 1 daily    Quantity: 510g    Pharmacy: [id number]  [place], [place]  [address] -[phone]     Does the patient have enough for 3 days?   [] Yes   [x] No - Send as HP to POD

## 2024-01-29 NOTE — TELEPHONE ENCOUNTER
LM for pt to return call to the office regarding her form that PCP needs to fill out. Inquired if pt could drop off the form or email the form to our . If pt calls back please warm transfer to office so we can provide the office managers email

## 2024-02-01 ENCOUNTER — TELEPHONE (OUTPATIENT)
Age: 79
End: 2024-02-01

## 2024-02-01 NOTE — TELEPHONE ENCOUNTER
Patient called in and wanted to let Dr. Pope know that she was there for her surgery and had the IV placed and then the wood Breaux postponed the sx because patient had COVID on 1/15/24 and now she is unsure if she wants to go through with the sx. Please advise.

## 2024-02-02 NOTE — TELEPHONE ENCOUNTER
Patient returned call to follow up with postponed surgery for 2 blockages in R leg  due to Covid diagnosis on 1/15/24. Patient is very apprehensive about having the surgery; just scared about this surgery. Surgeon expressed concern for patient's lungs to be clear; patient reports she is still has a cough and using medication post Covid. RN reviewed concern for patient with anesthesia. Patient would benefit from 1:1 conversation with PCP to discuss pros/cons for surgery and her apprehension. Please follow up with patient.

## 2024-02-02 NOTE — TELEPHONE ENCOUNTER
Patient returned call from office.  Warm transferred to [facility] in clinical Pod

## 2024-02-05 ENCOUNTER — TELEPHONE (OUTPATIENT)
Dept: FAMILY MEDICINE CLINIC | Facility: CLINIC | Age: 79
End: 2024-02-05

## 2024-02-05 DIAGNOSIS — E11.9 TYPE 2 DIABETES MELLITUS WITHOUT COMPLICATION, WITH LONG-TERM CURRENT USE OF INSULIN (HCC): ICD-10-CM

## 2024-02-05 DIAGNOSIS — E11.9 TYPE 2 DIABETES MELLITUS WITHOUT COMPLICATION, WITHOUT LONG-TERM CURRENT USE OF INSULIN (HCC): ICD-10-CM

## 2024-02-05 DIAGNOSIS — Z79.4 TYPE 2 DIABETES MELLITUS WITHOUT COMPLICATION, WITH LONG-TERM CURRENT USE OF INSULIN (HCC): ICD-10-CM

## 2024-02-05 RX ORDER — PERPHENAZINE 16 MG/1
TABLET, FILM COATED ORAL
Qty: 300 EACH | Refills: 1 | Status: SHIPPED | OUTPATIENT
Start: 2024-02-05

## 2024-02-05 RX ORDER — LANCETS
EACH MISCELLANEOUS
Qty: 300 EACH | Refills: 1 | Status: SHIPPED | OUTPATIENT
Start: 2024-02-05

## 2024-02-05 NOTE — TELEPHONE ENCOUNTER
PA for Insulin Glargine Solostar (Lantus SoloStar) 100 UNIT/ML    Submitted via  [x][id number]-KEY:[id number]  []Irena-Case ID #   []Faxed to plan   []Other website   []Phone call Case ID #     Office notes sent, clinical questions answered. Awaiting determination

## 2024-02-05 NOTE — TELEPHONE ENCOUNTER
Patient states medicare will only cover these if the prescription is for 300.    Reason for call:   [x] Refill   [] Prior Auth  [] Other:     Office:   [x] PCP/Provider -   [] Specialty/Provider -     Medication: glucose test strips  Lancets    Quantity: 300    Pharmacy: [id number]  [place] - [address] -[phone]     Does the patient have enough for 3 days?   [x] Yes   [] No - Send as HP to POD

## 2024-02-05 NOTE — TELEPHONE ENCOUNTER
Spoke to patient. COVId diagnosis was 01/15/2024. No further symptoms. She has an appt on 02/22/24, she said she will see you then. She did mention her Lantus needs prior auth so I send this to access center to initiate

## 2024-02-06 DIAGNOSIS — Z79.4 TYPE 2 DIABETES MELLITUS WITH HYPERGLYCEMIA, WITH LONG-TERM CURRENT USE OF INSULIN (HCC): ICD-10-CM

## 2024-02-06 DIAGNOSIS — E11.65 TYPE 2 DIABETES MELLITUS WITH HYPERGLYCEMIA, WITH LONG-TERM CURRENT USE OF INSULIN (HCC): ICD-10-CM

## 2024-02-06 RX ORDER — INSULIN GLARGINE 100 [IU]/ML
40 INJECTION, SOLUTION SUBCUTANEOUS
Qty: 12 ML | Refills: 5 | Status: SHIPPED | OUTPATIENT
Start: 2024-02-06

## 2024-02-06 RX ORDER — PEN NEEDLE, DIABETIC 32GX 5/32"
NEEDLE, DISPOSABLE MISCELLANEOUS DAILY
Qty: 100 EACH | Refills: 1 | Status: SHIPPED | OUTPATIENT
Start: 2024-02-06

## 2024-02-06 NOTE — TELEPHONE ENCOUNTER
PA for Insulin Glargine Solostar (Lantus SoloStar) 100 UNIT/ML Denied    Reason:        Message sent to office clinical pool Yes    Denial letter scanned into Media Yes    Appeal started No

## 2024-02-12 NOTE — TELEPHONE ENCOUNTER
Patient called to report that she's out of lancets and soon to be out of test strips. Pharmacy told her that they can't give her a quantity of 300 because she tests twice daily. Pharmacy also stated that they have faxed a form to Dr. Pope to fill out.    Please contact pharmacy to find out requirements needed for prescription.

## 2024-02-12 NOTE — TELEPHONE ENCOUNTER
Please correct quantity or instructions.   Clerical: I did not receive a form, can you please check [employer] and let me know?

## 2024-02-13 ENCOUNTER — TELEPHONE (OUTPATIENT)
Age: 79
End: 2024-02-13

## 2024-02-13 NOTE — TELEPHONE ENCOUNTER
Reason for call:     Patient called, states she spoke with someone in the PCP office about a form that needs completed for the pharmacy so she can get her test strips but states she has not received a call back. Please call patient to advise if form was recvd and completed.      [] Refill   [] Prior Auth  [x] Other:     Office:   [x] PCP/Provider - Dr Pope  [] Specialty/Provider -     Medication:   Contour Next Test Stips

## 2024-02-13 NOTE — TELEPHONE ENCOUNTER
I called the pharmacy twice to obtain form and was told they were faxing. I am not in office today to address this, we are remote today. Will have the  check solarity again tomorrow and see if it came. If not I will call them again.

## 2024-02-14 NOTE — TELEPHONE ENCOUNTER
Patient called. States this is the third time that she has called. Spoke with the pharmacy and they said that they have not received the form. Told patient that it was faxed over this morning around 8:45 to the pharmacy. Said she is going to call and tell them that it was faxed over. May be calling back.

## 2024-02-14 NOTE — TELEPHONE ENCOUNTER
Printed form since still not received from pharmacy. I found a [employer] diabetic form online. Faxed to pharmacy this AM

## 2024-02-21 ENCOUNTER — TELEPHONE (OUTPATIENT)
Dept: ADMINISTRATIVE | Facility: OTHER | Age: 79
End: 2024-02-21

## 2024-02-21 PROBLEM — Z00.00 HEALTH CARE MAINTENANCE: Status: RESOLVED | Noted: 2018-09-26 | Resolved: 2024-02-21

## 2024-02-21 NOTE — TELEPHONE ENCOUNTER
02/21/24 3:58 PM    Patient contacted (left message) to bring Advance Directive, POLST, or Living Will document to next scheduled pcp visit.    Thank you.  Alley Monet  [facility]

## 2024-02-22 ENCOUNTER — OFFICE VISIT (OUTPATIENT)
Dept: FAMILY MEDICINE CLINIC | Facility: CLINIC | Age: 79
End: 2024-02-22
Payer: MEDICARE

## 2024-02-22 VITALS
HEIGHT: 58 IN | BODY MASS INDEX: 31.32 KG/M2 | WEIGHT: 149.2 LBS | DIASTOLIC BLOOD PRESSURE: 82 MMHG | SYSTOLIC BLOOD PRESSURE: 120 MMHG | OXYGEN SATURATION: 98 % | TEMPERATURE: 97.2 F | HEART RATE: 80 BPM

## 2024-02-22 DIAGNOSIS — E11.65 TYPE 2 DIABETES MELLITUS WITH HYPERGLYCEMIA, WITH LONG-TERM CURRENT USE OF INSULIN (HCC): Primary | ICD-10-CM

## 2024-02-22 DIAGNOSIS — Z79.4 TYPE 2 DIABETES MELLITUS WITH HYPERGLYCEMIA, WITH LONG-TERM CURRENT USE OF INSULIN (HCC): Primary | ICD-10-CM

## 2024-02-22 DIAGNOSIS — J41.0 SIMPLE CHRONIC BRONCHITIS (HCC): ICD-10-CM

## 2024-02-22 DIAGNOSIS — I73.9 PERIPHERAL ARTERIAL DISEASE (HCC): ICD-10-CM

## 2024-02-22 DIAGNOSIS — I50.9 CHRONIC CONGESTIVE HEART FAILURE, UNSPECIFIED HEART FAILURE TYPE (HCC): ICD-10-CM

## 2024-02-22 PROBLEM — D68.9 COAGULOPATHY (HCC): Status: RESOLVED | Noted: 2022-12-06 | Resolved: 2024-02-22

## 2024-02-22 PROBLEM — I44.2 HEART BLOCK AV COMPLETE (HCC): Status: RESOLVED | Noted: 2020-06-15 | Resolved: 2024-02-22

## 2024-02-22 PROBLEM — I50.23 ACUTE ON CHRONIC SYSTOLIC (CONGESTIVE) HEART FAILURE (HCC): Status: ACTIVE | Noted: 2023-08-23

## 2024-02-22 LAB — SL AMB POCT HEMOGLOBIN AIC: 10.5 (ref ?–6.5)

## 2024-02-22 PROCEDURE — 99214 OFFICE O/P EST MOD 30 MIN: CPT | Performed by: FAMILY MEDICINE

## 2024-02-22 PROCEDURE — 83036 HEMOGLOBIN GLYCOSYLATED A1C: CPT | Performed by: FAMILY MEDICINE

## 2024-02-22 RX ORDER — INSULIN GLARGINE 100 [IU]/ML
50 INJECTION, SOLUTION SUBCUTANEOUS
Qty: 12 ML | Refills: 5 | Status: SHIPPED | OUTPATIENT
Start: 2024-02-22

## 2024-02-22 NOTE — PROGRESS NOTES
Name: Jayshree Bo      : 1945      MRN: [id number]  Encounter Provider: Ludwig Pope MD  Encounter Date: 2024   Encounter department: [facility]    Assessment & Plan     1. Type 2 diabetes mellitus with hyperglycemia, with long-term current use of insulin (HCC)  Assessment & Plan:    Lab Results   Component Value Date    HGBA1C 10.5 (A) 2024   Poor control due to dietary noncompliance. Does not wish to go to Endo. Will increase Semglee to 50u sc and urge compliance with prepaglinide and diet. Consider changing Prandin to Novolog or similar. Recheck 3m    Orders:  -     POCT hemoglobin A1c  -     Insulin Glargine Solostar (Semglee) 100 UNIT/ML SOPN; Inject 0.5 mL (50 Units total) under the skin daily at bedtime    2. Simple chronic bronchitis (HCC)  Assessment & Plan:  Breathing stable. F/u with Pulm. Recheck 3m      3. Peripheral arterial disease (HCC)  Assessment & Plan:  Had to cancel procedure dur to COVID infection. Pt now recovered - awaiting f/u with Vasc surgery at [facility]      4. Chronic congestive heart failure, unspecified heart failure type ([facility])  Assessment & Plan:  Wt Readings from Last 3 Encounters:   24 67.7 kg (149 lb 3.2 oz)   23 69 kg (152 lb 3.2 oz)   23 70 kg (154 lb 6.4 oz)   Weight down 3lb and pt appears euvolemic.  Continue present care. Monitor weight. Recheck 6m                     Subjective     f/u multiple med issues  - pt states that she is feeling well  - pt is not following her diet but is compliant with her meds. A1C in the office today = 10.5. She denies increased thirst or urination  - pt still with claudication symptoms. Pt was to have procedure (?stent) at [facility], but contracted COVID. Pt does not have a f/u yet.   - pt is up to date with Cardio. Pt denies CP, palpitations, lightheadedness or other CV symptoms with or without exertion.   - pt denies any new GI or  complaints          Review of Systems    Constitutional:  Positive for fatigue. Negative for activity change and appetite change.   HENT: Negative.     Eyes: Negative.    Respiratory: Negative.     Cardiovascular: Negative.    Gastrointestinal: Negative.    Endocrine: Negative.    Genitourinary: Negative.    Musculoskeletal:  Positive for arthralgias, back pain and myalgias.        (+) R leg claudication symptoms   Skin: Negative.    Neurological:  Negative for dizziness, weakness, light-headedness, numbness and headaches.   Psychiatric/Behavioral:  Negative for dysphoric mood. The patient is nervous/anxious.        Past Medical History:   Diagnosis Date   • Gastroenteritis      Past Surgical History:   Procedure Laterality Date   • ATRIAL CARDIAC PACEMAKER INSERTION       Family History   Problem Relation Age of Onset   • Diabetes Sister    • Dementia Sister      Social History     Socioeconomic History   • Marital status:      Spouse name: None   • Number of children: None   • Years of education: None   • Highest education level: None   Occupational History   • None   Tobacco Use   • Smoking status: Former   • Smokeless tobacco: Never   Substance and Sexual Activity   • Alcohol use: Not Currently   • Drug use: Never   • Sexual activity: None   Other Topics Concern   • None   Social History Narrative   • None     Social Determinants of Health     Financial Resource Strain: Low Risk  (8/14/2023)    Overall Financial Resource Strain (CARDIA)    • Difficulty of Paying Living Expenses: Not hard at all   Food Insecurity: No Food Insecurity (2/27/2023)    Received from [facility]    Hunger Vital Sign    • Worried About Running Out of Food in the Last Year: Never true    • Ran Out of Food in the Last Year: Never true   Transportation Needs: No Transportation Needs (8/14/2023)    PRAPARE - Transportation    • Lack of Transportation (Medical): No    • Lack of Transportation (Non-Medical): No   Physical Activity: Not on file   Stress: Not  on file   Social Connections: Not on file   Intimate Partner Violence: Not At Risk (2/27/2023)    Received from [facility]    Humiliation, Afraid, Rape, and Kick questionnaire    • Fear of Current or Ex-Partner: No    • Emotionally Abused: No    • Physically Abused: No    • Sexually Abused: No   Housing Stability: Low Risk  (2/27/2023)    Received from [facility]    Housing Stability Vital Sign    • Unable to Pay for Housing in the Last Year: No    • Number of Places Lived in the Last Year: 1    • Unstable Housing in the Last Year: No     Current Outpatient Medications on File Prior to Visit   Medication Sig   • Acetaminophen (TYLENOL ARTHRITIS PAIN PO) Take by mouth   • albuterol (Proventil HFA) 90 mcg/act inhaler Inhale 2 puffs every 6 (six) hours as needed for wheezing   • ascorbic acid (VITAMIN C) 500 mg tablet Take by mouth daily   • aspirin (ECOTRIN LOW STRENGTH) 81 mg EC tablet Take 81 mg by mouth daily   • atenolol (TENORMIN) 25 mg tablet Take 1 tablet (25 mg total) by mouth daily   • Blood Glucose Monitoring Suppl (Contour Next Monitor) w/Device KIT Use bid as directed   • chlordiazePOXIDE (LIBRIUM) 5 mg capsule Take 1 capsule (5 mg total) by mouth 2 (two) times a day   • Droplet Pen Needles 32G X 4 MM MISC Inject under the skin in the morning   • ezetimibe (ZETIA) 10 mg tablet TAKE 1 TABLET BY MOUTH DAILY   • fluticasone-umeclidinium-vilanterol 200-62.5-25 mcg/actuation AEPB inhaler Inhale 1 puff daily Rinse mouth after use.   • furosemide (LASIX) 20 mg tablet Take 1 tablet (20 mg total) by mouth daily   • gabapentin (NEURONTIN) 100 mg capsule Take 1 capsule (100 mg total) by mouth 3 (three) times a day   • glucose blood (Contour Next Test) test strip Patient tests blood sugars twice daily   • hydrOXYzine HCL (ATARAX) 25 mg tablet TAKE 1 TO 2 TABLETS BY MOUTH AT BEDTIME AS NEEDED FOR INSOMNIA   • imipramine (TOFRANIL) 25 mg tablet 1 tab in the morning and 2 tab qHS   •  "ipratropium-albuterol (DUO-NEB) 0.5-2.5 mg/3 mL nebulizer solution Inhale 3 mL 4 (four) times a day as needed   • losartan (COZAAR) 50 mg tablet Take 50 mg by mouth 2 (two) times a day   • Microlet Lancets MISC Patient tests blood glucose twice daily   • pantoprazole (PROTONIX) 40 mg tablet take 1 tablet by mouth once daily   • polyethylene glycol (GLYCOLAX) 17 GM/SCOOP powder Take 17 g by mouth daily as needed (constipation)   • pramipexole (MIRAPEX) 0.75 MG tablet take 1 tablet by mouth every evening   • repaglinide (PRANDIN) 1 mg tablet Take 1 tablet (1 mg total) by mouth 3 (three) times a day before meals   • Vascepa 1 g CAPS Take 2 capsules by mouth 2 (two) times a day   • Vitamin E 400 units TABS Take 2 tablets by mouth daily   • Evolocumab 140 MG/ML SOAJ Inject 140 mg under the skin every 14 (fourteen) days (Patient not taking: Reported on 11/20/2023)   • triamcinolone (KENALOG) 0.1 % cream Apply topically 2 (two) times a day (Patient not taking: Reported on 8/14/2023)     Allergies   Allergen Reactions   • Atorvastatin      Reaction Date: 23Dec2011;   Unknown reaction  LFT increased   • Clarithromycin      Reaction Date: 23Dec2011;   Patient does not recall reaction   • Hydrogen Peroxide Swelling     Reaction Date: 23Dec2011;   Swelling in the mouth   • Levofloxacin      Patient does not recall her reaction   • Other Swelling     Perioxol (mouth rinse)  Perioxol (mouth rinse)   • Penicillins Hives and Swelling   • Rosuvastatin      Reaction Date: 23Dec2011;   Unknown reaction per patient  Increase LFT     Immunization History   Administered Date(s) Administered   • COVID-19 PFIZER VACCINE 0.3 ML IM 02/17/2021, 03/10/2021, 12/09/2021   • Tuberculin Skin Test-PPD Intradermal 04/14/2008       Objective     /82 (BP Location: Left arm, Patient Position: Sitting, Cuff Size: Adult)   Pulse 80   Temp (!) 97.2 °F (36.2 °C)   Ht 4' 9.64\" (1.464 m)   Wt 67.7 kg (149 lb 3.2 oz)   SpO2 98%   BMI 31.58 kg/m² "     Physical Exam  Vitals reviewed.   HENT:      Head: Normocephalic.      Mouth/Throat:      Mouth: Mucous membranes are moist.   Eyes:      General: No scleral icterus.     Extraocular Movements: Extraocular movements intact.      Conjunctiva/sclera: Conjunctivae normal.      Pupils: Pupils are equal, round, and reactive to light.      Comments: No pallor apreciated   Cardiovascular:      Rate and Rhythm: Normal rate and regular rhythm.   Pulmonary:      Effort: Pulmonary effort is normal.      Breath sounds: No wheezing or rales.   Abdominal:      General: There is no distension.      Palpations: There is no mass.      Tenderness: There is no abdominal tenderness.   Musculoskeletal:         General: Deformity (diffuse arthritic changes) present.      Cervical back: No tenderness.      Right lower leg: No edema.      Left lower leg: No edema.   Lymphadenopathy:      Cervical: No cervical adenopathy.   Skin:     General: Skin is warm.   Neurological:      Mental Status: She is alert.       Ludwig Pope MD

## 2024-02-25 PROBLEM — I50.9 CHRONIC CONGESTIVE HEART FAILURE (HCC): Status: ACTIVE | Noted: 2023-08-23

## 2024-02-25 NOTE — ASSESSMENT & PLAN NOTE
Had to cancel procedure dur to COVID infection. Pt now recovered - awaiting f/u with Vasc surgery at [facility]

## 2024-02-25 NOTE — ASSESSMENT & PLAN NOTE
Wt Readings from Last 3 Encounters:   02/22/24 67.7 kg (149 lb 3.2 oz)   11/20/23 69 kg (152 lb 3.2 oz)   09/05/23 70 kg (154 lb 6.4 oz)   Weight down 3lb and pt appears euvolemic.  Continue present care. Monitor weight. Recheck 6m

## 2024-02-25 NOTE — ASSESSMENT & PLAN NOTE
Lab Results   Component Value Date    HGBA1C 10.5 (A) 02/22/2024   Poor control due to dietary noncompliance. Does not wish to go to Endo. Will increase Semglee to 50u sc and urge compliance with prepaglinide and diet. Consider changing Prandin to Novolog or similar. Recheck 3m

## 2024-02-28 DIAGNOSIS — F51.01 PRIMARY INSOMNIA: ICD-10-CM

## 2024-02-28 RX ORDER — HYDROXYZINE HYDROCHLORIDE 25 MG/1
TABLET, FILM COATED ORAL
Qty: 60 TABLET | Refills: 5 | Status: SHIPPED | OUTPATIENT
Start: 2024-02-28

## 2024-03-03 DIAGNOSIS — R13.10 DYSPHAGIA, UNSPECIFIED TYPE: ICD-10-CM

## 2024-03-03 RX ORDER — PANTOPRAZOLE SODIUM 40 MG/1
40 TABLET, DELAYED RELEASE ORAL DAILY
Qty: 30 TABLET | Refills: 5 | Status: SHIPPED | OUTPATIENT
Start: 2024-03-03

## 2024-03-04 ENCOUNTER — TELEPHONE (OUTPATIENT)
Dept: FAMILY MEDICINE CLINIC | Facility: CLINIC | Age: 79
End: 2024-03-04

## 2024-03-04 NOTE — TELEPHONE ENCOUNTER
Patient called in to get Pantoprazole refilled. I let her know it was already sent over to the pharmacy yesterday and to reach out to them. Patient verbalized understanding.

## 2024-03-04 NOTE — TELEPHONE ENCOUNTER
Telephone call from patient that she wanted Dr. Pope to know that her leg surgery for her blockages was cancelled again due to her having Pneumonia in her right lung. She has an appointment with a lung specialist today.     And she said happy birthday.

## 2024-03-10 DIAGNOSIS — G25.81 RESTLESS LEG: ICD-10-CM

## 2024-03-10 RX ORDER — PRAMIPEXOLE DIHYDROCHLORIDE 0.75 MG/1
0.75 TABLET ORAL EVERY EVENING
Qty: 30 TABLET | Refills: 5 | Status: SHIPPED | OUTPATIENT
Start: 2024-03-10

## 2024-03-16 DIAGNOSIS — F41.9 ANXIETY: ICD-10-CM

## 2024-03-17 RX ORDER — IMIPRAMINE HCL 25 MG
TABLET ORAL
Qty: 270 TABLET | Refills: 3 | Status: SHIPPED | OUTPATIENT
Start: 2024-03-17

## 2024-04-04 DIAGNOSIS — Z79.4 TYPE 2 DIABETES MELLITUS WITH HYPERGLYCEMIA, WITH LONG-TERM CURRENT USE OF INSULIN (HCC): ICD-10-CM

## 2024-04-04 DIAGNOSIS — E11.65 TYPE 2 DIABETES MELLITUS WITH HYPERGLYCEMIA, WITH LONG-TERM CURRENT USE OF INSULIN (HCC): ICD-10-CM

## 2024-04-04 RX ORDER — INSULIN GLARGINE 100 [IU]/ML
50 INJECTION, SOLUTION SUBCUTANEOUS
Qty: 12 ML | Refills: 5 | Status: SHIPPED | OUTPATIENT
Start: 2024-04-04 | End: 2024-04-05 | Stop reason: SDUPTHER

## 2024-04-04 NOTE — TELEPHONE ENCOUNTER
2nd attempt pt is on her last insulin and needs the   Insulin Glargine Solostar (Semglee) 100 UNIT/ML SOPN   [facility] stated that PCP must call in script to pharmacy.           Please have PCP call in script to pharmacy as soon as possible.       Thank you for your assistance.

## 2024-04-04 NOTE — TELEPHONE ENCOUNTER
Patient called refill line stating that she's down to her last lantus pen and the pharmacy told her that she needed to call the office. I see lantus was discontinued on [id number] and she was switched to the Semglee. However pharmacy is telling her that there is a problem with the prescription. Please review and call pharmacy to find out what the problem is and please call patient.

## 2024-04-04 NOTE — TELEPHONE ENCOUNTER
Refill is needed because the last script was sent as fill later and it is causing an issue at the pharmacy.

## 2024-04-05 DIAGNOSIS — E11.65 TYPE 2 DIABETES MELLITUS WITH HYPERGLYCEMIA, WITH LONG-TERM CURRENT USE OF INSULIN (HCC): ICD-10-CM

## 2024-04-05 DIAGNOSIS — I10 PRIMARY HYPERTENSION: ICD-10-CM

## 2024-04-05 DIAGNOSIS — Z79.4 TYPE 2 DIABETES MELLITUS WITH HYPERGLYCEMIA, WITH LONG-TERM CURRENT USE OF INSULIN (HCC): ICD-10-CM

## 2024-04-05 RX ORDER — INSULIN GLARGINE 100 [IU]/ML
50 INJECTION, SOLUTION SUBCUTANEOUS
Qty: 12 ML | Refills: 5 | Status: SHIPPED | OUTPATIENT
Start: 2024-04-05

## 2024-04-05 RX ORDER — ATENOLOL 25 MG/1
25 TABLET ORAL 2 TIMES DAILY
Qty: 180 TABLET | Refills: 1 | Status: SHIPPED | OUTPATIENT
Start: 2024-04-05

## 2024-04-05 NOTE — TELEPHONE ENCOUNTER
Reason for call: I told Patient that this medication is at her Pharmacy with 5 refills, but Pharmacy keep telling her it is not there! Resending it again.  [x] Refill   [] Prior Auth  [] Other:     Office:   [x] PCP/Provider -   [] Specialty/Provider -     Medication: Insulin Glargine Solostar (Semglee) 100 UNIT/ML SOPN     Dose/Frequency:  Inject 0.5 mL (50 Units total) under the skin daily at bedtime     Quantity:  12 mL     Pharmacy: [id number]  [place], [place]  [address] [phone]     Does the patient have enough for 3 days?   [] Yes   [x] No - Send as HP to POD

## 2024-04-05 NOTE — TELEPHONE ENCOUNTER
Mat from [facility] called in stated that Dr Carrasco needs a new script  to be sent to Pharmacy for Pt Semglee 100ml please. Please advise thank you

## 2024-04-10 ENCOUNTER — TELEPHONE (OUTPATIENT)
Dept: FAMILY MEDICINE CLINIC | Facility: CLINIC | Age: 79
End: 2024-04-10

## 2024-04-11 ENCOUNTER — TELEPHONE (OUTPATIENT)
Age: 79
End: 2024-04-11

## 2024-04-11 NOTE — TELEPHONE ENCOUNTER
DELLI Patient had procedure yesterday at [facility] for blockage.  was unable to do.Was unable to get blockage though arm.  Will see specialist on 5/3

## 2024-05-01 DIAGNOSIS — J44.1 COPD EXACERBATION (HCC): ICD-10-CM

## 2024-05-01 DIAGNOSIS — R06.00 DYSPNEA, UNSPECIFIED TYPE: ICD-10-CM

## 2024-05-01 DIAGNOSIS — R09.89 RALES: ICD-10-CM

## 2024-05-01 NOTE — TELEPHONE ENCOUNTER
Patient called to request a refill for their fluticasone-umeclidinium-vilanterol  advised a refill was requested on 5/1/24 and is pending approval. Patient verbalized understanding and is in agreement.

## 2024-05-03 RX ORDER — FLUTICASONE FUROATE, UMECLIDINIUM BROMIDE AND VILANTEROL TRIFENATATE 200; 62.5; 25 UG/1; UG/1; UG/1
POWDER RESPIRATORY (INHALATION)
Qty: 60 EACH | Refills: 1 | Status: SHIPPED | OUTPATIENT
Start: 2024-05-03

## 2024-05-06 DIAGNOSIS — R05.8 PRODUCTIVE COUGH: ICD-10-CM

## 2024-05-06 RX ORDER — ALBUTEROL SULFATE 90 UG/1
2 AEROSOL, METERED RESPIRATORY (INHALATION) EVERY 6 HOURS PRN
Qty: 6.7 G | Refills: 2 | Status: SHIPPED | OUTPATIENT
Start: 2024-05-06

## 2024-05-10 DIAGNOSIS — F41.9 ANXIETY: ICD-10-CM

## 2024-05-10 RX ORDER — CHLORDIAZEPOXIDE HYDROCHLORIDE 5 MG/1
5 CAPSULE, GELATIN COATED ORAL 2 TIMES DAILY
Qty: 180 CAPSULE | Refills: 1 | Status: SHIPPED | OUTPATIENT
Start: 2024-05-10

## 2024-05-10 NOTE — TELEPHONE ENCOUNTER
Reason for call:   [x] Refill   [] Prior Auth  [] Other:     Office:   [x] PCP/Provider -   [] Specialty/Provider -     Medication: Chlordiazepoxide 5 mg, take 1 capsule by mouth 2 times a day       Pharmacy: [facility] [address] [place]     Does the patient have enough for 3 days?   [x] Yes   [] No - Send as HP to POD    Finasteride Male Counseling: Finasteride Counseling:  I discussed with the patient the risks of use of finasteride including but not limited to decreased libido, decreased ejaculate volume, gynecomastia, and depression. Women should not handle medication.  All of the patient's questions and concerns were addressed. Finasteride Counseling:  I discussed with the patient the risks of use of finasteride including but not limited to decreased libido, decreased ejaculate volume, gynecomastia, and depression. Women should not handle medication.  All of the patient's questions and concerns were addressed.

## 2024-05-10 NOTE — TELEPHONE ENCOUNTER
1 [id number] 04/06/2024 01/10/2024 chlordiazePOXIDE HCL (Capsule) 60.0 30 5 MG NA DANNY CHOWDARY [employer] [place] 3 / 1 [place]    1 [id number] 03/03/2024 01/10/2024 chlordiazePOXIDE HCL (Capsule) 60.0 30 5 MG NA DANNY CHOWDARY [employer] [place] 2 / 1 [place]    1 [id number] 02/07/2024 01/10/2024 chlordiazePOXIDE HCL (Capsule) 60.0 30 5 MG NA DANNY CHOWDARY [employer] [place] 1 / 1 [place]    1 [id number] 01/14/2024 01/10/2024 chlordiazePOXIDE HCL (Capsule) 50.0 25 5 MG NA DANNY CHOWDARY [employer] [place] 0 / 1 [place]    1 [id number] 01/10/2024 01/10/2024 chlordiazePOXIDE HCL (Capsule) 10.0 5 5 MG NA DANNY CHOWDARY [employer] [place] 0 / 1 [place]    2 [id number] 12/09/2023 12/06/2023 chlordiazePOXIDE HCL (Capsule) 60.0 30 5 MG NA DANNY CHOWDARY [employer] [place] 0 / 0 [place]    2 [id number] 11/07/2023 11/06/2023 chlordiazePOXIDE HCL (Capsule) 60.0 30 5 MG NA DANNY CHOWDARY [employer] [place] 0 / 0 [place]    1 [id number] 10/09/2023 10/09/2023 chlordiazePOXIDE HCL (Capsule) 60.0 30 5 MG NA DANNY CHOWDARY, [employer] [place] 0 / 0 [place]    1 [id number] 09/08/2023 09/07/2023 chlordiazePOXIDE HCL (Capsule) 60.0 30 5 MG NA DANNY CHOWDARY [employer] [place] 0 / 0 [place]    1 [id number] 08/29/2023 08/29/2023 CODEINE PHOSPHATE/guaiFENesin (Solution) 240.0 12 2 MG/ML / 20 MG/ML 6.0 [facility] Commercial Insurance 0 / 1

## 2024-05-13 ENCOUNTER — TELEPHONE (OUTPATIENT)
Age: 79
End: 2024-05-13

## 2024-05-13 NOTE — TELEPHONE ENCOUNTER
Per Insurance patient needs prior auth for next refill of Insulin Glargine Solostar.  Please start

## 2024-05-16 ENCOUNTER — TELEPHONE (OUTPATIENT)
Dept: OTHER | Facility: HOSPITAL | Age: 79
End: 2024-05-16

## 2024-05-16 ENCOUNTER — TELEPHONE (OUTPATIENT)
Age: 79
End: 2024-05-16

## 2024-05-16 ENCOUNTER — RA CDI HCC (OUTPATIENT)
Dept: OTHER | Facility: HOSPITAL | Age: 79
End: 2024-05-16

## 2024-05-16 DIAGNOSIS — I25.10 3-VESSEL CAD: ICD-10-CM

## 2024-05-16 NOTE — TELEPHONE ENCOUNTER
Reason for call:   [] Refill   [x] Prior Auth  [x] Other: pt received a letter from [employer] stating to talk to her dr regarding the med formulary.  She's not sure if its asking for a PA    Office:   [x] PCP/Provider - Ludwig Pope MD   [] Specialty/Provider -     Medication: chlordiazePOXIDE (LIBRIUM) 5 mg capsule     Dose/Frequency: Take 1 capsule (5 mg total) by mouth 2 (two) times a day     Quantity: #180    Pharmacy: [id number] - [place], [place] - [address]     Does the patient have enough for 3 days?   [] Yes   [x] No - Send as HP to POD

## 2024-05-17 ENCOUNTER — TELEPHONE (OUTPATIENT)
Dept: ADMINISTRATIVE | Facility: OTHER | Age: 79
End: 2024-05-17

## 2024-05-17 RX ORDER — FUROSEMIDE 20 MG/1
20 TABLET ORAL DAILY
Qty: 90 TABLET | Refills: 1 | Status: SHIPPED | OUTPATIENT
Start: 2024-05-17

## 2024-05-17 NOTE — TELEPHONE ENCOUNTER
05/17/24 1:09 PM    Patient contacted (left message) to bring Advance Directive, POLST, or Living Will document to next scheduled pcp visit.    Thank you.  Noris Hare  [facility]

## 2024-05-17 NOTE — TELEPHONE ENCOUNTER
PA for Insulin Glargine Solostar (Lantus SoloStar) 100 UNIT/ML was already completed and Denied (refer to telephone encounter from 2-5-2024). Rx looks like it was changed to covered alternative Insulin Glargine Solostar (Semglee) 100 UNIT/ML SOPN    A PA is not needed for the Insulin Glargine Solostar (Semglee) 100 unit as it's a covered formulary medication

## 2024-05-23 NOTE — TELEPHONE ENCOUNTER
Additional form received from [employer] with questions to be completed. Form completed and faxed back.

## 2024-05-23 NOTE — TELEPHONE ENCOUNTER
PA for CHLORDIAZEPOXIDE 5 MG SUBMITTED     Submitted via    [x]CMM-KEY [id number]  [][employer]-Case ID #   []Faxed to plan   []Other website   []Phone call Case ID #     Office notes sent, clinical questions answered. Awaiting determination    Turnaround time for your insurance to make a decision on your Prior Authorization can take 7-21 business days.

## 2024-05-24 NOTE — TELEPHONE ENCOUNTER
PA for Chlordiazepoxide (LIBRIUM) 5 mg capsule Denied    Reason:      Message sent to office clinical pool Yes    Denial letter scanned into Media Yes    Appeal started No     **Please follow up with your patient regarding denial and next steps**

## 2024-05-29 ENCOUNTER — TELEPHONE (OUTPATIENT)
Age: 79
End: 2024-05-29

## 2024-05-29 NOTE — TELEPHONE ENCOUNTER
We have been attempting to reach this patient. In previous message the insurance formulary changed and he needs to send in a different med. Attempted to call patient

## 2024-05-29 NOTE — TELEPHONE ENCOUNTER
Pt called and keeps getting letter from [employer] that they dont have this medication on her med list ; chlordiazePOXIDE (librium) 5mg and been on it for years and she just picked up a refill and she said it was covered. She contacted [facility] and said they need her provider to contact them to verify this. Please call [phone] ref # for pt is [id number].

## 2024-06-02 ENCOUNTER — TELEPHONE (OUTPATIENT)
Dept: OTHER | Facility: OTHER | Age: 79
End: 2024-06-02

## 2024-06-02 DIAGNOSIS — I10 PRIMARY HYPERTENSION: Primary | ICD-10-CM

## 2024-06-02 NOTE — TELEPHONE ENCOUNTER
Patient calling for guidance about taking medications prior to cardiology stress test procedure. Patient unable to reach cardiologist with [facility] and asking for input from primary care. Advised patient cardiologist would be appropriate contact for this question.

## 2024-06-03 RX ORDER — LOSARTAN POTASSIUM 50 MG/1
50 TABLET ORAL 2 TIMES DAILY
Qty: 180 TABLET | Refills: 1 | Status: SHIPPED | OUTPATIENT
Start: 2024-06-03

## 2024-06-05 ENCOUNTER — TELEPHONE (OUTPATIENT)
Age: 79
End: 2024-06-05

## 2024-06-05 NOTE — TELEPHONE ENCOUNTER
Pt called to inquire if Dr. Pope could give her a cortisone shot in her knee.      Please give her a call back.

## 2024-06-05 NOTE — TELEPHONE ENCOUNTER
Call from patient advising she had a stress test on Monday. Please return her call as she would like to know why Dr. Carrasco is asking this.

## 2024-06-06 ENCOUNTER — OFFICE VISIT (OUTPATIENT)
Dept: FAMILY MEDICINE CLINIC | Facility: CLINIC | Age: 79
End: 2024-06-06
Payer: COMMERCIAL

## 2024-06-06 VITALS
HEIGHT: 58 IN | TEMPERATURE: 97.5 F | DIASTOLIC BLOOD PRESSURE: 76 MMHG | SYSTOLIC BLOOD PRESSURE: 120 MMHG | OXYGEN SATURATION: 93 % | BODY MASS INDEX: 30.82 KG/M2 | HEART RATE: 80 BPM | WEIGHT: 146.8 LBS

## 2024-06-06 DIAGNOSIS — Z79.4 TYPE 2 DIABETES MELLITUS WITH HYPERGLYCEMIA, WITH LONG-TERM CURRENT USE OF INSULIN (HCC): Primary | ICD-10-CM

## 2024-06-06 DIAGNOSIS — F41.9 ANXIETY: ICD-10-CM

## 2024-06-06 DIAGNOSIS — I25.10 3-VESSEL CAD: ICD-10-CM

## 2024-06-06 DIAGNOSIS — M70.50 ANSERINE BURSITIS: ICD-10-CM

## 2024-06-06 DIAGNOSIS — E11.65 TYPE 2 DIABETES MELLITUS WITH HYPERGLYCEMIA, WITH LONG-TERM CURRENT USE OF INSULIN (HCC): Primary | ICD-10-CM

## 2024-06-06 DIAGNOSIS — I10 PRIMARY HYPERTENSION: ICD-10-CM

## 2024-06-06 DIAGNOSIS — I50.22 CHRONIC SYSTOLIC HEART FAILURE (HCC): ICD-10-CM

## 2024-06-06 PROBLEM — I50.23 ACUTE ON CHRONIC SYSTOLIC (CONGESTIVE) HEART FAILURE (HCC): Status: ACTIVE | Noted: 2024-06-06

## 2024-06-06 LAB — SL AMB POCT HEMOGLOBIN AIC: 10.2 (ref ?–6.5)

## 2024-06-06 PROCEDURE — 83036 HEMOGLOBIN GLYCOSYLATED A1C: CPT | Performed by: FAMILY MEDICINE

## 2024-06-06 PROCEDURE — 99214 OFFICE O/P EST MOD 30 MIN: CPT | Performed by: FAMILY MEDICINE

## 2024-06-06 PROCEDURE — 20610 DRAIN/INJ JOINT/BURSA W/O US: CPT | Performed by: FAMILY MEDICINE

## 2024-06-06 RX ORDER — CLOTRIMAZOLE AND BETAMETHASONE DIPROPIONATE 10; .64 MG/G; MG/G
CREAM TOPICAL 2 TIMES DAILY
COMMUNITY
Start: 2024-05-26 | End: 2025-05-26

## 2024-06-06 RX ORDER — INSULIN GLARGINE-YFGN 100 [IU]/ML
INJECTION, SOLUTION SUBCUTANEOUS
COMMUNITY
Start: 2024-06-02 | End: 2024-06-06 | Stop reason: SDUPTHER

## 2024-06-06 RX ORDER — INSULIN GLARGINE 100 [IU]/ML
50 INJECTION, SOLUTION SUBCUTANEOUS
Start: 2024-06-06

## 2024-06-06 RX ORDER — IMIPRAMINE HCL 25 MG
25 TABLET ORAL 2 TIMES DAILY
Qty: 270 TABLET | Refills: 3 | Status: SHIPPED | OUTPATIENT
Start: 2024-06-06

## 2024-06-06 RX ORDER — CHLORDIAZEPOXIDE HYDROCHLORIDE 5 MG/1
5 CAPSULE, GELATIN COATED ORAL 2 TIMES DAILY
Qty: 180 CAPSULE | Refills: 1 | Status: SHIPPED | OUTPATIENT
Start: 2024-06-06

## 2024-06-06 RX ADMIN — BUPIVACAINE HYDROCHLORIDE 2 ML: 7.5 INJECTION, SOLUTION EPIDURAL; RETROBULBAR at 10:30

## 2024-06-06 RX ADMIN — METHYLPREDNISOLONE ACETATE 1 ML: 40 INJECTION, SUSPENSION INTRA-ARTICULAR; INTRALESIONAL; INTRAMUSCULAR; SOFT TISSUE at 10:30

## 2024-06-06 NOTE — PROGRESS NOTES
eAmbulatory Visit  Name: Jayshree Bo      : 1945      MRN: [id number]  Encounter Provider: Ludwig Pope MD  Encounter Date: 2024   Encounter department: [facility]    Assessment & Plan   1. Type 2 diabetes mellitus with hyperglycemia, with long-term current use of insulin (HCC)  Assessment & Plan:    Lab Results   Component Value Date    HGBA1C 10.2 (A) 2024   Poor control.  Will increase lantus to 50u qd, and continue repaglinide. Urged compliance with diet. Recheck 3m - pt to call with [facility]gs weekly in the interim  Orders:  -     POCT hemoglobin A1c  -     Insulin Glargine Solostar (Semglee) 100 UNIT/ML SOPN; Inject 0.5 mL (50 Units total) under the skin daily at bedtime  2. Anxiety  Assessment & Plan:  Stable on present care.  Recheck 3m  Orders:  -     imipramine (TOFRANIL) 25 mg tablet; Take 1 tablet (25 mg total) by mouth 2 (two) times a day TAKE 1 TABLET BY MOUTH EVERY MORNING AND 2 TABLETS AT BEDTIME  -     Evolocumab 140 MG/ML SOAJ; Inject 1 mL (140 mg total) under the skin every 14 (fourteen) days  -     chlordiazePOXIDE (LIBRIUM) 5 mg capsule; Take 1 capsule (5 mg total) by mouth 2 (two) times a day  3. Chronic systolic heart failure (HCC)  Assessment & Plan:  Wt Readings from Last 3 Encounters:   24 66.6 kg (146 lb 12.8 oz)   24 67.7 kg (149 lb 3.2 oz)   23 69 kg (152 lb 3.2 oz)     Weight stable. No signs of fluid overload. Continue present care.  F/u with Cardio        4. Anserine bursitis  Assessment & Plan:  Improved s/p injection. I reviewed post injection instructions with pt.  Recheck 1w if not resolved - earlier if pain worsens  Orders:  -     Large joint arthrocentesis: R pes anserine bursa  -     bupivacaine (PF) (MARCAINE) 0.75 % injection 2 mL  -     methylPREDNISolone acetate (DEPO-MEDROL) injection 1 mL  5. 3-vessel CAD  Assessment & Plan:  Pt with recent (+) stress myoview.  Pt is asymptomatic. She will follow up  with cardio to discuss further work up/treatment  6. Primary hypertension  Assessment & Plan:  Well controlled. Cont present treatment. Monitor labs. Recheck 6m           History of Present Illness     f/u multiple med issues  - pt with recent stress Myoview/nuclear stress test.  There was some areas of probable ischemia with a exercise-induced ejection fraction of 38%.  Cardiology has not recommended any intervention at present.  Patient was asymptomatic during her test.  She is continuing on present medications  - pt believes that she has been doing well with her diet.  However, recent A1c in the office was 10.2.  Patient is taking 40 of Lantus and either 0.5 to 1 mg of Prandin with each meal.  She denies increased thirst or urination  - pt with increased right knee pain for the last week.  Pain worse with getting out of a chair or going up stairs, but has some with ambulating on a flat surface. Pt denies knee swelling  - pt denies any new GI or  complaints      Review of Systems   Constitutional: Negative.    HENT: Negative.     Eyes: Negative.    Respiratory: Negative.     Cardiovascular: Negative.    Gastrointestinal: Negative.    Genitourinary: Negative.    Musculoskeletal:  Positive for arthralgias, gait problem and myalgias. Negative for joint swelling.   Neurological:  Negative for dizziness, weakness, light-headedness, numbness and headaches.     Past Medical History:   Diagnosis Date   • Gastroenteritis      Past Surgical History:   Procedure Laterality Date   • ATRIAL CARDIAC PACEMAKER INSERTION       Family History   Problem Relation Age of Onset   • Diabetes Sister    • Dementia Sister      Social History     Tobacco Use   • Smoking status: Former   • Smokeless tobacco: Never   Vaping Use   • Vaping status: Never Used   Substance and Sexual Activity   • Alcohol use: Not Currently   • Drug use: Never   • Sexual activity: Not on file     Current Outpatient Medications on File Prior to Visit    Medication Sig   • Acetaminophen (TYLENOL ARTHRITIS PAIN PO) Take by mouth   • albuterol (Proventil HFA) 90 mcg/act inhaler Inhale 2 puffs every 6 (six) hours as needed for wheezing   • ascorbic acid (VITAMIN C) 500 mg tablet Take by mouth daily   • aspirin (ECOTRIN LOW STRENGTH) 81 mg EC tablet Take 81 mg by mouth daily   • atenolol (TENORMIN) 25 mg tablet TAKE 1 TABLET BY MOUTH TWICE DAILY   • Blood Glucose Monitoring Suppl (Contour Next Monitor) w/Device KIT Use bid as directed   • clotrimazole-betamethasone (LOTRISONE) 1-0.05 % cream Apply topically 2 (two) times a day   • Droplet Pen Needles 32G X 4 MM MISC Inject under the skin in the morning   • ezetimibe (ZETIA) 10 mg tablet TAKE 1 TABLET BY MOUTH DAILY   • fluticasone-umeclidinium-vilanterol (Trelegy Ellipta) 200-62.5-25 mcg/actuation AEPB inhaler INHALE 1 PUFFS DAILY RINSE MOUTH AFTER USE   • furosemide (LASIX) 20 mg tablet TAKE 1 TABLET(20 MG) BY MOUTH DAILY   • glucose blood (Contour Next Test) test strip Patient tests blood sugars twice daily   • Vascepa 1 g CAPS Take 2 capsules by mouth 2 (two) times a day   • Vitamin E 400 units TABS Take 2 tablets by mouth daily   • gabapentin (NEURONTIN) 100 mg capsule Take 1 capsule (100 mg total) by mouth 3 (three) times a day   • ipratropium-albuterol (DUO-NEB) 0.5-2.5 mg/3 mL nebulizer solution Inhale 3 mL 4 (four) times a day as needed   • losartan (COZAAR) 50 mg tablet TAKE 1 TABLET BY MOUTH TWICE DAILY   • Microlet Lancets MISC Patient tests blood glucose twice daily   • pantoprazole (PROTONIX) 40 mg tablet TAKE 1 TABLET BY MOUTH DAILY   • polyethylene glycol (GLYCOLAX) 17 GM/SCOOP powder Take 17 g by mouth daily as needed (constipation)   • pramipexole (MIRAPEX) 0.75 MG tablet TAKE 1 TABLET BY MOUTH EVERY EVENING   • repaglinide (PRANDIN) 1 mg tablet Take 1 tablet (1 mg total) by mouth 3 (three) times a day before meals   • triamcinolone (KENALOG) 0.1 % cream Apply topically 2 (two) times a day (Patient  "not taking: Reported on 8/14/2023)     Allergies   Allergen Reactions   • Atorvastatin      Reaction Date: 23Dec2011;   Unknown reaction  LFT increased   • Clarithromycin      Reaction Date: 23Dec2011;   Patient does not recall reaction   • Hydrogen Peroxide Swelling     Reaction Date: 23Dec2011;   Swelling in the mouth   • Levofloxacin      Patient does not recall her reaction   • Other Swelling     Perioxol (mouth rinse)  Perioxol (mouth rinse)   • Penicillins Hives and Swelling   • Rosuvastatin      Reaction Date: 23Dec2011;   Unknown reaction per patient  Increase LFT     Immunization History   Administered Date(s) Administered   • COVID-19 PFIZER VACCINE 0.3 ML IM 02/17/2021, 03/10/2021, 12/09/2021   • Tuberculin Skin Test-PPD Intradermal 04/14/2008     Objective     /76   Pulse 80   Temp 97.5 °F (36.4 °C)   Ht 4' 9.64\" (1.464 m)   Wt 66.6 kg (146 lb 12.8 oz)   SpO2 93%   BMI 31.07 kg/m²     Physical Exam  Vitals reviewed.   HENT:      Head: Normocephalic.      Mouth/Throat:      Mouth: Mucous membranes are moist.   Eyes:      Extraocular Movements: Extraocular movements intact.      Conjunctiva/sclera: Conjunctivae normal.      Pupils: Pupils are equal, round, and reactive to light.   Cardiovascular:      Rate and Rhythm: Normal rate and regular rhythm.      Pulses: Normal pulses.   Pulmonary:      Effort: Pulmonary effort is normal.   Abdominal:      General: There is no distension.      Palpations: There is no mass.      Tenderness: There is no abdominal tenderness.   Musculoskeletal:         General: Tenderness (TTP over the R pes anserine bursa. Knee without effusion. ligaments intact.) and deformity present.      Cervical back: No tenderness.      Right lower leg: No edema.      Left lower leg: No edema.   Lymphadenopathy:      Cervical: No cervical adenopathy.   Skin:     General: Skin is warm.      Capillary Refill: Capillary refill takes less than 2 seconds.   Neurological:      General: No " focal deficit present.      Mental Status: She is alert and oriented to person, place, and time.           Large joint arthrocentesis: R pes anserine bursa  Universal Protocol:  Consent: Verbal consent obtained. Written consent obtained.  Risks and benefits: risks, benefits and alternatives were discussed  Consent given by: patient  Patient understanding: patient states understanding of the procedure being performed  Patient consent: the patient's understanding of the procedure matches consent given  Patient identity confirmed: verbally with patient  Supporting Documentation  Indications: pain   Procedure Details  Location: knee - R pes anserine bursa  Preparation: Patient was prepped and draped in the usual sterile fashion  Needle size: 25 G  Ultrasound guidance: no  Approach: anteromedial  Medications administered: 1 mL methylPREDNISolone acetate 40 mg/mL; 2 mL bupivacaine (PF) 0.75 %    Patient tolerance: patient tolerated the procedure well with no immediate complications  Dressing:  Sterile dressing applied           Administrative Statements

## 2024-06-07 ENCOUNTER — NURSE TRIAGE (OUTPATIENT)
Dept: OTHER | Facility: OTHER | Age: 79
End: 2024-06-07

## 2024-06-07 NOTE — TELEPHONE ENCOUNTER
"Regarding: Cortisone shot / Knee pain  ----- Message from Miguel HOUSE sent at 6/7/2024  6:23 PM EDT -----  \"My PCP gave a cortisone shot yesterday and I still get a lot of pain. Why?\"    "

## 2024-06-07 NOTE — TELEPHONE ENCOUNTER
Pt called states after yesterday getting steroid injection her BS were:    Last night = 119  This morning = 127    Pt states this is the best it has been in a long time.    Pt states she's has so much pain in the leg; woke her up at 12:30 am. She wants to know how long will it take for injection to start working. She can't lay down or rest it wo it hurting.    Please advise

## 2024-06-07 NOTE — TELEPHONE ENCOUNTER
Reviewed home care advice with patient, verbalized understanding. Offered to page on call provider for further recommendations but patient refused. Instructed patient to call back with worsening symptoms or questions, verbalized understanding.     Please follow up with patient.

## 2024-06-07 NOTE — TELEPHONE ENCOUNTER
"Reason for Disposition  • Knee pain    Answer Assessment - Initial Assessment Questions  1. LOCATION and RADIATION: \"Where is the pain located?\"       R knee    2. QUALITY: \"What does the pain feel like?\"  (e.g., sharp, dull, aching, burning)      Aches    3. SEVERITY: \"How bad is the pain?\" \"What does it keep you from doing?\"   (Scale 1-10; or mild, moderate, severe)    -  MILD (1-3): doesn't interfere with normal activities     -  MODERATE (4-7): interferes with normal activities (e.g., work or school) or awakens from sleep, limping     -  SEVERE (8-10): excruciating pain, unable to do any normal activities, unable to walk      When standing it hurts. Hurts to walk 8/10. No pain when elevated.     4. ONSET: \"When did the pain start?\" \"Does it come and go, or is it there all the time?\"      Pain has been going on since before injection.     5. RECURRENT: \"Have you had this pain before?\" If Yes, ask: \"When, and what happened then?\"     Recurrent due to arthritis.     6. SETTING: \"Has there been any recent work, exercise or other activity that involved that part of the body?\"       Had cortisone shot yesterday in knee.     7. AGGRAVATING FACTORS: \"What makes the knee pain worse?\" (e.g., walking, climbing stairs, running)      Walking and standing makes pain worse.    8. ASSOCIATED SYMPTOMS: \"Is there any swelling or redness of the knee?\"      Denies    9. OTHER SYMPTOMS: \"Do you have any other symptoms?\" (e.g., chest pain, difficulty breathing, fever, calf pain)      Denies.     Been putting ice on it and taking tylenol...     Patient state that her blood glucose 119 last night. This . Patient is aware the glucose can elevate due to injection.     Been taking tylenol arthritis every 4 hours. Just started taking today. Reminded patient to medications as prescribed or labeled, verbalized understanding. Patient states that her doctor  told her not to take Advil.    Protocols used: Knee Pain-ADULT-AH    "

## 2024-06-08 ENCOUNTER — NURSE TRIAGE (OUTPATIENT)
Dept: OTHER | Facility: OTHER | Age: 79
End: 2024-06-08

## 2024-06-08 NOTE — TELEPHONE ENCOUNTER
Reason for Disposition  • [1] SEVERE pain (e.g., excruciating, unable to walk) AND [2] not improved after 2 hours of pain medicine    Protocols used: Knee Pain-ADULT-AH

## 2024-06-08 NOTE — TELEPHONE ENCOUNTER
"Regarding: Knee still hurts after Cortisone Shot  ----- Message from Blanquita KAHN sent at 6/8/2024 10:37 AM EDT -----  \" I got a Cortisone shot in my right knee, and it's still painful on the side.\"    "

## 2024-06-08 NOTE — TELEPHONE ENCOUNTER
"Answer Assessment - Initial Assessment Questions  1. LOCATION and RADIATION: \"Where is the pain located?\"       Right knee on inner side of knee (at site of injection)    2. QUALITY: \"What does the pain feel like?\"  (e.g., sharp, dull, aching, burning)      \"Sharp\"    3. SEVERITY: \"How bad is the pain?\" \"What does it keep you from doing?\"   (Scale 1-10; or mild, moderate, severe)    -  MILD (1-3): doesn't interfere with normal activities     -  MODERATE (4-7): interferes with normal activities (e.g., work or school) or awakens from sleep, limping     -  SEVERE (8-10): excruciating pain, unable to do any normal activities, unable to walk      9/10 with standing    4. ONSET: \"When did the pain start?\" \"Does it come and go, or is it there all the time?\"      Still in pain    5. RECURRENT: \"Have you had this pain before?\" If Yes, ask: \"When, and what happened then?\"      Ongoing    6. SETTING: \"Has there been any recent work, exercise or other activity that involved that part of the body?\"       Denies    7. AGGRAVATING FACTORS: \"What makes the knee pain worse?\" (e.g., walking, climbing stairs, running)      Denies    8. ASSOCIATED SYMPTOMS: \"Is there any swelling or redness of the knee?\"      Denies    9. OTHER SYMPTOMS: \"Do you have any other symptoms?\" (e.g., chest pain, difficulty breathing, fever, calf pain)      Denies    Taking tylenol arthritis without relief    Protocols used: Knee Pain-ADULT-AH    Confirmed patient does not have fever.  On call provider recommending urgent care evaluation.  Pt advised and agreeable.  "

## 2024-06-09 PROBLEM — I50.22 CHRONIC SYSTOLIC HEART FAILURE (HCC): Status: ACTIVE | Noted: 2024-06-06

## 2024-06-09 PROBLEM — M70.50 ANSERINE BURSITIS: Status: ACTIVE | Noted: 2024-06-09

## 2024-06-09 RX ORDER — BUPIVACAINE HYDROCHLORIDE 7.5 MG/ML
2 INJECTION, SOLUTION EPIDURAL; RETROBULBAR
Status: COMPLETED | OUTPATIENT
Start: 2024-06-06 | End: 2024-06-06

## 2024-06-09 RX ORDER — METHYLPREDNISOLONE ACETATE 40 MG/ML
1 INJECTION, SUSPENSION INTRA-ARTICULAR; INTRALESIONAL; INTRAMUSCULAR; SOFT TISSUE
Status: COMPLETED | OUTPATIENT
Start: 2024-06-06 | End: 2024-06-06

## 2024-06-09 NOTE — ASSESSMENT & PLAN NOTE
Wt Readings from Last 3 Encounters:   06/06/24 66.6 kg (146 lb 12.8 oz)   02/22/24 67.7 kg (149 lb 3.2 oz)   11/20/23 69 kg (152 lb 3.2 oz)     Weight stable. No signs of fluid overload. Continue present care.  F/u with Cardio

## 2024-06-09 NOTE — ASSESSMENT & PLAN NOTE
Improved s/p injection. I reviewed post injection instructions with pt.  Recheck 1w if not resolved - earlier if pain worsens

## 2024-06-09 NOTE — ASSESSMENT & PLAN NOTE
Pt with recent (+) stress myoview.  Pt is asymptomatic. She will follow up with cardio to discuss further work up/treatment

## 2024-06-09 NOTE — ASSESSMENT & PLAN NOTE
Lab Results   Component Value Date    HGBA1C 10.2 (A) 06/06/2024   Poor control.  Will increase lantus to 50u qd, and continue repaglinide. Urged compliance with diet. Recheck 3m - pt to call with [facility]gs weekly in the interim

## 2024-06-10 ENCOUNTER — TELEPHONE (OUTPATIENT)
Age: 79
End: 2024-06-10

## 2024-06-10 DIAGNOSIS — M70.50 ANSERINE BURSITIS: Primary | ICD-10-CM

## 2024-06-10 RX ORDER — TRAMADOL HYDROCHLORIDE 50 MG/1
50 TABLET ORAL EVERY 6 HOURS PRN
Qty: 20 TABLET | Refills: 0 | Status: SHIPPED | OUTPATIENT
Start: 2024-06-10

## 2024-06-10 NOTE — TELEPHONE ENCOUNTER
Pt called stating she was seen at [facility] care now on 6/8/24 for pain in her right knee. She states she was told she would be given 3 days worth of pain medication from urgent care, however nothing was sent. She is asking if PCP is able to prescribe something to help with her pain. If so she is asking it be sent to [facility] [address], [place] pharmacy.     She mentions her sugars have been on the lower side, she feels good:   6/6-119  6/7- 127  6/8- 131  6/9- 102  6/10- 110

## 2024-06-10 NOTE — TELEPHONE ENCOUNTER
FYI: Pt was returning a call from office. Pt was transferred to Hafsa at office and she assisted pt with her needs.

## 2024-06-12 ENCOUNTER — TELEPHONE (OUTPATIENT)
Dept: FAMILY MEDICINE CLINIC | Facility: CLINIC | Age: 79
End: 2024-06-12

## 2024-06-12 NOTE — TELEPHONE ENCOUNTER
Patient transferred from the     Patient states she woke up feeling weak and she has been sleeping all day. Stated tramadol 50 mg yesterday, took 2 yesterday and 1 this morning. Denies nausea, chest pain and blurred vision.      Still having knee pain.  States after cortisone shot, blood sugar have been good, 3 days ago it was 106, today they were 86 and 88, after breakfast it was in the hundreds.

## 2024-06-12 NOTE — TELEPHONE ENCOUNTER
Spoke with patient, took 1 every 6 hours, States she if feeling after eating.    Also before getting in contact with you yesterday patient went to urgent care and they prescribed Prednisone 10 mg. Asking if she can take it with Tramadol. And if she can cut the tramadol in half, Spoke with Simran and she verbalized that that was okay to do.

## 2024-06-14 NOTE — TELEPHONE ENCOUNTER
Pt called, she wants to let Dr. Pope know that her right knee is still bothering her and she is still very tired and fatigued. Please advise

## 2024-06-17 ENCOUNTER — NURSE TRIAGE (OUTPATIENT)
Age: 79
End: 2024-06-17

## 2024-06-17 DIAGNOSIS — R19.7 DIARRHEA OF PRESUMED INFECTIOUS ORIGIN: Primary | ICD-10-CM

## 2024-06-17 DIAGNOSIS — E11.65 TYPE 2 DIABETES MELLITUS WITH HYPERGLYCEMIA, WITH LONG-TERM CURRENT USE OF INSULIN (HCC): ICD-10-CM

## 2024-06-17 DIAGNOSIS — Z79.4 TYPE 2 DIABETES MELLITUS WITH HYPERGLYCEMIA, WITH LONG-TERM CURRENT USE OF INSULIN (HCC): ICD-10-CM

## 2024-06-17 RX ORDER — REPAGLINIDE 1 MG/1
1 TABLET ORAL
Qty: 90 TABLET | Refills: 5 | Status: SHIPPED | OUTPATIENT
Start: 2024-06-17

## 2024-06-17 NOTE — TELEPHONE ENCOUNTER
Reason for call:   [x] Refill   [] Prior Auth  [] Other:     Office:   [x] PCP/Provider - Ludwig Pope,  [] Specialty/Provider -     Medication: Repaglinide    Dose/Frequency: 1 mg TID AC meals    Quantity: 90    Pharmacy: [facility] [place] [address]    Does the patient have enough for 3 days?   [x] Yes   [] No - Send as HP to POD

## 2024-06-17 NOTE — TELEPHONE ENCOUNTER
"Pt calling, for the last 5-7 days, pt has been experiencing some nausea and diarrhea.  In the last 24 hours, she has had 3 loose BMs.  Pt states that she has some cramping prior to each BM and it at times occurs after eating.  She feels weak at times right after having a BM, but otherwise she has no other symptoms. Pt denies any s/s of diarrhea.  Her sugars have been good in the low 100s.  She has tried two doses of imodium, but it did not help that much. RN advised bland diet and sugar free gatorade so support hydration. Care Advice given.  Pt has an appt on 06/20/2024 with Dr. Carrasco.  Does he want to see her sooner or have any suggestions for her to implement until he sees her?       Reason for Disposition   MILD diarrhea (e.g., 1-3 or more stools than normal in past 24 hours) diarrhea without known cause and present > 7 days    Answer Assessment - Initial Assessment Questions  1. DIARRHEA SEVERITY: \"How bad is the diarrhea?\" \"How many extra stools have you had in the past 24 hours than normal?\"     - NO DIARRHEA (SCALE 0)    - MILD (SCALE 1-3): Few loose or mushy BMs; increase of 1-3 stools over normal daily number of stools; mild increase in ostomy output.    -  MODERATE (SCALE 4-7): Increase of 4-6 stools daily over normal; moderate increase in ostomy output.  * SEVERE (SCALE 8-10; OR 'WORST POSSIBLE'): Increase of 7 or more stools daily over normal; moderate increase in ostomy output; incontinence.      3  2. ONSET: \"When did the diarrhea begin?\"       5-6 days   3. BM CONSISTENCY: \"How loose or watery is the diarrhea?\"      Very loose   4. VOMITING: \"Are you also vomiting?\" If Yes, ask: \"How many times in the past 24 hours?\"       no  5. ABDOMINAL PAIN: \"Are you having any abdominal pain?\" If Yes, ask: \"What does it feel like?\" (e.g., crampy, dull, intermittent, constant)       Nausea, gas pain   6. ABDOMINAL PAIN SEVERITY: If present, ask: \"How bad is the pain?\"  (e.g., Scale 1-10; mild, moderate, or " "severe)    - MILD (1-3): doesn't interfere with normal activities, abdomen soft and not tender to touch     - MODERATE (4-7): interferes with normal activities or awakens from sleep, tender to touch     - SEVERE (8-10): excruciating pain, doubled over, unable to do any normal activities        Mild   7. ORAL INTAKE: If vomiting, \"Have you been able to drink liquids?\" \"How much fluids have you had in the past 24 hours?\"      Yes   8. HYDRATION: \"Any signs of dehydration?\" (e.g., dry mouth [not just dry lips], too weak to stand, dizziness, new weight loss) \"When did you last urinate?\"      Weakness after diarrhea   9. EXPOSURE: \"Have you traveled to a foreign country recently?\" \"Have you been exposed to anyone with diarrhea?\" \"Could you have eaten any food that was spoiled?\"      no  10. ANTIBIOTIC USE: \"Are you taking antibiotics now or have you taken antibiotics in the past 2 months?\"        no  11. OTHER SYMPTOMS: \"Do you have any other symptoms?\" (e.g., fever, blood in stool)        no  12. PREGNANCY: \"Is there any chance you are pregnant?\" \"When was your last menstrual period?\"        no    Protocols used: Diarrhea-ADULT-[place]    "

## 2024-06-17 NOTE — TELEPHONE ENCOUNTER
Regarding: diarrhea, nausea  ----- Message from Carmen WEBB sent at 6/17/2024  2:07 PM EDT -----  Patient called stating she has had diarrhea for the last week where she is going at least 3x a day and is feeling nauseous all of the time.  She wanted appt on weds (offered with Preeti, but pt only wants to see Dr. Carrasco).  She is set for this Thursday 6/20 with Dr. Carrasco but please advise if should be seen sooner.

## 2024-06-18 ENCOUNTER — NURSE TRIAGE (OUTPATIENT)
Dept: OTHER | Facility: OTHER | Age: 79
End: 2024-06-18

## 2024-06-18 ENCOUNTER — TELEPHONE (OUTPATIENT)
Age: 79
End: 2024-06-18

## 2024-06-18 LAB
ALBUMIN SERPL-MCNC: 3.6 G/DL (ref 3.5–5.7)
ALP SERPL-CCNC: 101 U/L (ref 35–120)
ALT SERPL-CCNC: 43 U/L
ANION GAP SERPL CALCULATED.3IONS-SCNC: 10 MMOL/L (ref 3–11)
AST SERPL-CCNC: 23 U/L
BASOPHILS # BLD AUTO: 0.1 THOU/CMM (ref 0–0.1)
BASOPHILS NFR BLD AUTO: 1 %
BILIRUB SERPL-MCNC: 1.3 MG/DL (ref 0.2–1)
BUN SERPL-MCNC: 15 MG/DL (ref 7–25)
CALCIUM SERPL-MCNC: 8.8 MG/DL (ref 8.5–10.1)
CHLORIDE SERPL-SCNC: 101 MMOL/L (ref 100–109)
CO2 SERPL-SCNC: 29 MMOL/L (ref 21–31)
CREAT SERPL-MCNC: 0.84 MG/DL (ref 0.4–1.1)
CRP SERPL-MCNC: 24.9 MG/L
CYTOLOGY CMNT CVX/VAG CYTO-IMP: ABNORMAL
DIFFERENTIAL METHOD BLD: NORMAL
EOSINOPHIL # BLD AUTO: 0.2 THOU/CMM (ref 0–0.5)
EOSINOPHIL NFR BLD AUTO: 2 %
ERYTHROCYTE [DISTWIDTH] IN BLOOD BY AUTOMATED COUNT: 13.9 % (ref 12–16)
GFR/BSA.PRED SERPLBLD CYS-BASED-ARV: 71 ML/MIN/{1.73_M2}
GLUCOSE SERPL-MCNC: 288 MG/DL (ref 65–99)
HCT VFR BLD AUTO: 38.9 % (ref 35–43)
HGB BLD-MCNC: 13.5 G/DL (ref 11.5–14.5)
LYMPHOCYTES # BLD AUTO: 1.4 THOU/CMM (ref 1–3)
LYMPHOCYTES NFR BLD AUTO: 19 %
MCH RBC QN AUTO: 33 PG (ref 26–34)
MCHC RBC AUTO-ENTMCNC: 34.7 G/DL (ref 32–37)
MCV RBC AUTO: 95 FL (ref 80–100)
MONOCYTES # BLD AUTO: 0.5 THOU/CMM (ref 0.3–1)
MONOCYTES NFR BLD AUTO: 7 %
NEUTROPHILS # BLD AUTO: 5.4 THOU/CMM (ref 1.8–7.8)
NEUTROPHILS NFR BLD AUTO: 71 %
PLATELET # BLD AUTO: 232 THOU/CMM (ref 140–350)
PMV BLD REES-ECKER: 10.2 FL (ref 7.5–11.3)
POTASSIUM SERPL-SCNC: 3.8 MMOL/L (ref 3.5–5.2)
PROT SERPL-MCNC: 5.9 G/DL (ref 6.3–8.3)
RBC # BLD AUTO: 4.08 MILL/CMM (ref 3.7–4.7)
SODIUM SERPL-SCNC: 140 MMOL/L (ref 135–145)
WBC # BLD AUTO: 7.5 THOU/CMM (ref 4–10)

## 2024-06-18 NOTE — TELEPHONE ENCOUNTER
"Reason for Disposition  • [1] MODERATE diarrhea (e.g., 4-6 times / day more than normal) AND [2] age > 70 years    Answer Assessment - Initial Assessment Questions  1. DIARRHEA SEVERITY: \"How bad is the diarrhea?\" \"How many extra stools have you had in the past 24 hours than normal?\"     - NO DIARRHEA (SCALE 0)    - MILD (SCALE 1-3): Few loose or mushy BMs; increase of 1-3 stools over normal daily number of stools; mild increase in ostomy output.    -  MODERATE (SCALE 4-7): Increase of 4-6 stools daily over normal; moderate increase in ostomy output.  * SEVERE (SCALE 8-10; OR 'WORST POSSIBLE'): Increase of 7 or more stools daily over normal; moderate increase in ostomy output; incontinence.      3-4 times a day     2. ONSET: \"When did the diarrhea begin?\"       5-6 days ago     3. BM CONSISTENCY: \"How loose or watery is the diarrhea?\"      Watery     4. VOMITING: \"Are you also vomiting?\" If Yes, ask: \"How many times in the past 24 hours?\"       Denies    5. ABDOMINAL PAIN: \"Are you having any abdominal pain?\" If Yes, ask: \"What does it feel like?\" (e.g., crampy, dull, intermittent, constant)       Denies    6. ABDOMINAL PAIN SEVERITY: If present, ask: \"How bad is the pain?\"  (e.g., Scale 1-10; mild, moderate, or severe)    - MILD (1-3): doesn't interfere with normal activities, abdomen soft and not tender to touch     - MODERATE (4-7): interferes with normal activities or awakens from sleep, tender to touch     - SEVERE (8-10): excruciating pain, doubled over, unable to do any normal activities        0/10    7. ORAL INTAKE: If vomiting, \"Have you been able to drink liquids?\" \"How much fluids have you had in the past 24 hours?\"      Yes, Gatorade and water    8. HYDRATION: \"Any signs of dehydration?\" (e.g., dry mouth [not just dry lips], too weak to stand, dizziness, new weight loss) \"When did you last urinate?\"      Weak, last urinated 20 minutes ago     9. EXPOSURE: \"Have you traveled to a foreign country " "recently?\" \"Have you been exposed to anyone with diarrhea?\" \"Could you have eaten any food that was spoiled?\"      Denies    10. ANTIBIOTIC USE: \"Are you taking antibiotics now or have you taken antibiotics in the past 2 months?\"        Denies    11. OTHER SYMPTOMS: \"Do you have any other symptoms?\" (e.g., fever, blood in stool)  Chills once in awhile, but denies fever           Immodium once today    Protocols used: Diarrhea-ADULT-    "

## 2024-06-18 NOTE — TELEPHONE ENCOUNTER
"Regarding: diarrhea  / weak  ----- Message from Ilsa AMBROSIO sent at 6/18/2024  5:49 PM EDT -----  \"I have had diarrhea  for over 5 days now . I gave a sample this morning but I have not gotten the results back yet. I am felling weak . I am not sure  what to do\"    "

## 2024-06-18 NOTE — TELEPHONE ENCOUNTER
Pt called in stating that she went for the blood work that PCP sent in yesterday. Pt is still experiencing the same symptoms. States that she had 1 episode of diarrhea today. Pt has appt 6/20. RN advised continuing with bland diet and hydration.

## 2024-06-18 NOTE — TELEPHONE ENCOUNTER
[facility] called the patient was there and wanted the labs faxed over to [phone]   sent

## 2024-06-19 ENCOUNTER — TELEPHONE (OUTPATIENT)
Dept: ADMINISTRATIVE | Facility: OTHER | Age: 79
End: 2024-06-19

## 2024-06-19 NOTE — TELEPHONE ENCOUNTER
06/19/24 10:58 AM    Patient contacted to bring Advance Directive, POLST, or Living Will document to next scheduled pcp visit.[facility] Department left message.    Thank you.  Jolly Aguilar  [facility]

## 2024-06-20 ENCOUNTER — OFFICE VISIT (OUTPATIENT)
Dept: FAMILY MEDICINE CLINIC | Facility: CLINIC | Age: 79
End: 2024-06-20
Payer: COMMERCIAL

## 2024-06-20 VITALS
TEMPERATURE: 98.3 F | SYSTOLIC BLOOD PRESSURE: 128 MMHG | DIASTOLIC BLOOD PRESSURE: 82 MMHG | HEIGHT: 58 IN | HEART RATE: 80 BPM | WEIGHT: 146.2 LBS | BODY MASS INDEX: 30.69 KG/M2 | OXYGEN SATURATION: 98 %

## 2024-06-20 DIAGNOSIS — K52.9 AGE (ACUTE GASTROENTERITIS): Primary | ICD-10-CM

## 2024-06-20 PROCEDURE — 99213 OFFICE O/P EST LOW 20 MIN: CPT | Performed by: FAMILY MEDICINE

## 2024-06-20 PROCEDURE — G2211 COMPLEX E/M VISIT ADD ON: HCPCS | Performed by: FAMILY MEDICINE

## 2024-06-20 RX ORDER — CIPROFLOXACIN 500 MG/1
500 TABLET, FILM COATED ORAL 2 TIMES DAILY
COMMUNITY
Start: 2024-06-19 | End: 2024-06-29

## 2024-06-20 RX ORDER — METRONIDAZOLE 500 MG/1
500 TABLET ORAL 3 TIMES DAILY
COMMUNITY
Start: 2024-06-19 | End: 2024-06-29

## 2024-06-20 NOTE — PROGRESS NOTES
Ambulatory Visit  Name: Jayshree Bo      : 1945      MRN: [id number]  Encounter Provider: Ludwig Pope MD  Encounter Date: 2024   Encounter department: [facility]    Assessment & Plan   1. AGE (acute gastroenteritis)    Discussion:  I reviewed with pt.  Appears to be viral, though cannot r/o food related.  I explained that majority of these episodes are self limited. Pt without signs of dehydration.  REC: continue conservative care for now. Recheck 3-4d if no resolving - earlier if worse       History of Present Illness     77 yo female here for c/o  nausea and diarrhea (3-4x a day) over the last 6d.  She denies fever, chills, melena, hematochezia, URI symptoms or other complaints. Unsure it it could be food related.  May be a little better today  - pt denies any new CV, resp, or  issues.  No recent change in meds      Review of Systems   Constitutional:  Positive for appetite change and fatigue. Negative for activity change, chills and fever.   HENT: Negative.     Eyes: Negative.    Respiratory: Negative.     Cardiovascular: Negative.    Gastrointestinal:  Positive for diarrhea, nausea and vomiting. Negative for abdominal pain and constipation.   Genitourinary: Negative.    Musculoskeletal: Negative.    Skin: Negative.    Neurological: Negative.      Past Medical History:   Diagnosis Date   • Gastroenteritis      Past Surgical History:   Procedure Laterality Date   • ATRIAL CARDIAC PACEMAKER INSERTION       Family History   Problem Relation Age of Onset   • Diabetes Sister    • Dementia Sister      Social History     Tobacco Use   • Smoking status: Former   • Smokeless tobacco: Never   Vaping Use   • Vaping status: Never Used   Substance and Sexual Activity   • Alcohol use: Not Currently   • Drug use: Never   • Sexual activity: Not on file     Current Outpatient Medications on File Prior to Visit   Medication Sig   • Acetaminophen (TYLENOL ARTHRITIS PAIN  PO) Take by mouth   • albuterol (Proventil HFA) 90 mcg/act inhaler Inhale 2 puffs every 6 (six) hours as needed for wheezing   • ascorbic acid (VITAMIN C) 500 mg tablet Take by mouth daily   • aspirin (ECOTRIN LOW STRENGTH) 81 mg EC tablet Take 81 mg by mouth daily   • atenolol (TENORMIN) 25 mg tablet TAKE 1 TABLET BY MOUTH TWICE DAILY   • Blood Glucose Monitoring Suppl (Contour Next Monitor) w/Device KIT Use bid as directed   • chlordiazePOXIDE (LIBRIUM) 5 mg capsule Take 1 capsule (5 mg total) by mouth 2 (two) times a day   • ciprofloxacin (CIPRO) 500 mg tablet Take 500 mg by mouth 2 (two) times a day   • clotrimazole-betamethasone (LOTRISONE) 1-0.05 % cream Apply topically 2 (two) times a day   • Droplet Pen Needles 32G X 4 MM MISC Inject under the skin in the morning   • Evolocumab 140 MG/ML SOAJ Inject 1 mL (140 mg total) under the skin every 14 (fourteen) days   • ezetimibe (ZETIA) 10 mg tablet TAKE 1 TABLET BY MOUTH DAILY   • fluticasone-umeclidinium-vilanterol (Trelegy Ellipta) 200-62.5-25 mcg/actuation AEPB inhaler INHALE 1 PUFFS DAILY RINSE MOUTH AFTER USE   • furosemide (LASIX) 20 mg tablet TAKE 1 TABLET(20 MG) BY MOUTH DAILY   • gabapentin (NEURONTIN) 100 mg capsule Take 1 capsule (100 mg total) by mouth 3 (three) times a day   • glucose blood (Contour Next Test) test strip Patient tests blood sugars twice daily   • imipramine (TOFRANIL) 25 mg tablet Take 1 tablet (25 mg total) by mouth 2 (two) times a day TAKE 1 TABLET BY MOUTH EVERY MORNING AND 2 TABLETS AT BEDTIME   • Insulin Glargine Solostar (Semglee) 100 UNIT/ML SOPN Inject 0.5 mL (50 Units total) under the skin daily at bedtime   • ipratropium-albuterol (DUO-NEB) 0.5-2.5 mg/3 mL nebulizer solution Inhale 3 mL 4 (four) times a day as needed   • losartan (COZAAR) 50 mg tablet TAKE 1 TABLET BY MOUTH TWICE DAILY   • metroNIDAZOLE (FLAGYL) 500 mg tablet Take 500 mg by mouth Three times a day   • Microlet Lancets MISC Patient tests blood glucose twice  "daily   • pantoprazole (PROTONIX) 40 mg tablet TAKE 1 TABLET BY MOUTH DAILY   • polyethylene glycol (GLYCOLAX) 17 GM/SCOOP powder Take 17 g by mouth daily as needed (constipation)   • pramipexole (MIRAPEX) 0.75 MG tablet TAKE 1 TABLET BY MOUTH EVERY EVENING   • repaglinide (PRANDIN) 1 mg tablet Take 1 tablet (1 mg total) by mouth 3 (three) times a day before meals   • traMADol (Ultram) 50 mg tablet Take 1 tablet (50 mg total) by mouth every 6 (six) hours as needed for moderate pain   • Vascepa 1 g CAPS Take 2 capsules by mouth 2 (two) times a day   • Vitamin E 400 units TABS Take 2 tablets by mouth daily   • triamcinolone (KENALOG) 0.1 % cream Apply topically 2 (two) times a day (Patient not taking: Reported on 8/14/2023)     Allergies   Allergen Reactions   • Atorvastatin      Reaction Date: 23Dec2011;   Unknown reaction  LFT increased   • Clarithromycin      Reaction Date: 23Dec2011;   Patient does not recall reaction   • Hydrogen Peroxide Swelling     Reaction Date: 23Dec2011;   Swelling in the mouth   • Levofloxacin      Patient does not recall her reaction   • Other Swelling     Perioxol (mouth rinse)  Perioxol (mouth rinse)   • Penicillins Hives and Swelling   • Rosuvastatin      Reaction Date: 23Dec2011;   Unknown reaction per patient  Increase LFT     Immunization History   Administered Date(s) Administered   • COVID-19 PFIZER VACCINE 0.3 ML IM 02/17/2021, 03/10/2021, 12/09/2021   • Tuberculin Skin Test-PPD Intradermal 04/14/2008     Objective     /82 (BP Location: Left arm, Patient Position: Sitting, Cuff Size: Adult)   Pulse 80   Temp 98.3 °F (36.8 °C)   Ht 4' 9.6\" (1.463 m)   Wt 66.3 kg (146 lb 3.2 oz)   SpO2 98%   BMI 30.98 kg/m²     Physical Exam  Vitals reviewed.   HENT:      Head: Normocephalic.      Nose: Nose normal.      Mouth/Throat:      Mouth: Mucous membranes are moist.   Eyes:      General: No scleral icterus.     Extraocular Movements: Extraocular movements intact.      " Conjunctiva/sclera: Conjunctivae normal.      Pupils: Pupils are equal, round, and reactive to light.   Cardiovascular:      Rate and Rhythm: Normal rate and regular rhythm.   Pulmonary:      Effort: Pulmonary effort is normal.   Abdominal:      General: There is no distension.      Palpations: There is no mass.      Tenderness: There is no abdominal tenderness. There is no right CVA tenderness or left CVA tenderness.   Musculoskeletal:      Cervical back: No tenderness.      Right lower leg: No edema.      Left lower leg: No edema.   Lymphadenopathy:      Cervical: No cervical adenopathy.   Skin:     General: Skin is warm.      Capillary Refill: Capillary refill takes less than 2 seconds.      Coloration: Skin is not jaundiced.   Neurological:      General: No focal deficit present.      Mental Status: She is alert and oriented to person, place, and time.       Administrative Statements

## 2024-07-02 DIAGNOSIS — R09.89 RALES: ICD-10-CM

## 2024-07-02 DIAGNOSIS — J44.1 COPD EXACERBATION (HCC): ICD-10-CM

## 2024-07-02 DIAGNOSIS — R06.00 DYSPNEA, UNSPECIFIED TYPE: ICD-10-CM

## 2024-07-02 RX ORDER — FLUTICASONE FUROATE, UMECLIDINIUM BROMIDE AND VILANTEROL TRIFENATATE 200; 62.5; 25 UG/1; UG/1; UG/1
POWDER RESPIRATORY (INHALATION)
Qty: 60 EACH | Refills: 1 | Status: SHIPPED | OUTPATIENT
Start: 2024-07-02

## 2024-07-02 RX ORDER — FLUTICASONE FUROATE, UMECLIDINIUM BROMIDE AND VILANTEROL TRIFENATATE 200; 62.5; 25 UG/1; UG/1; UG/1
POWDER RESPIRATORY (INHALATION)
Qty: 60 EACH | Refills: 0 | Status: CANCELLED | OUTPATIENT
Start: 2024-07-02

## 2024-07-05 DIAGNOSIS — I25.10 3-VESSEL CAD: ICD-10-CM

## 2024-07-05 DIAGNOSIS — E78.2 MIXED HYPERLIPIDEMIA: ICD-10-CM

## 2024-07-05 RX ORDER — EZETIMIBE 10 MG/1
10 TABLET ORAL DAILY
Qty: 30 TABLET | Refills: 5 | Status: SHIPPED | OUTPATIENT
Start: 2024-07-05

## 2024-07-22 ENCOUNTER — TELEPHONE (OUTPATIENT)
Dept: ADMINISTRATIVE | Facility: OTHER | Age: 79
End: 2024-07-22

## 2024-07-22 NOTE — TELEPHONE ENCOUNTER
07/22/24 2:01 PM    Patient contacted to bring Advance Directive, POLST, or Living Will document to next scheduled pcp visit.[facility] Department left message.    Thank you.  Bronwyn Piper [facility]

## 2024-08-06 ENCOUNTER — TELEPHONE (OUTPATIENT)
Age: 79
End: 2024-08-06

## 2024-08-06 DIAGNOSIS — R05.8 PRODUCTIVE COUGH: ICD-10-CM

## 2024-08-06 NOTE — TELEPHONE ENCOUNTER
Patient called requesting refill for   Trelegy Ellipta 200-62.5-25 MCG/ACT AEPB inhaler . Patient made aware medication was refilled on 7/2/2024 for 1 inhaler  with 1 [facility]ills to [id number]. Patient instructed to contact the pharmacy to obtain refills of medication. Patient verbalized understanding.        Patient called requesting refill for albuterol (Proventil HFA) 90 mcg/act inhaler . Patient made aware medication was refilled on 5/6/2024 for 1 inhaler with 2 refills to [id number]   [facility]. Patient instructed to contact the pharmacy to obtain refills of medication. Patient verbalized understanding.

## 2024-08-07 DIAGNOSIS — Z79.4 TYPE 2 DIABETES MELLITUS WITHOUT COMPLICATION, WITH LONG-TERM CURRENT USE OF INSULIN (HCC): ICD-10-CM

## 2024-08-07 DIAGNOSIS — E11.9 TYPE 2 DIABETES MELLITUS WITHOUT COMPLICATION, WITH LONG-TERM CURRENT USE OF INSULIN (HCC): ICD-10-CM

## 2024-08-07 RX ORDER — ALBUTEROL SULFATE 90 UG/1
AEROSOL, METERED RESPIRATORY (INHALATION)
Qty: 18 G | Refills: 2 | Status: SHIPPED | OUTPATIENT
Start: 2024-08-07

## 2024-08-07 NOTE — TELEPHONE ENCOUNTER
Patient called the RX Refill Line. Message is being forwarded to the office.     Patient is requesting the office call [id number] - [place], [place] - [address] -[phone]. Patient was told Ludwig Pope MD would have to call the pharmacy to request glucose blood (Contour Next Test) test strip.    Patient stated she called her insurance company was was told the above medication was approved and needs a verbal request.     Please contact patient at [phone].

## 2024-08-11 DIAGNOSIS — E11.65 TYPE 2 DIABETES MELLITUS WITH HYPERGLYCEMIA, WITH LONG-TERM CURRENT USE OF INSULIN (HCC): ICD-10-CM

## 2024-08-11 DIAGNOSIS — Z79.4 TYPE 2 DIABETES MELLITUS WITH HYPERGLYCEMIA, WITH LONG-TERM CURRENT USE OF INSULIN (HCC): ICD-10-CM

## 2024-08-11 RX ORDER — PEN NEEDLE, DIABETIC 32GX 5/32"
NEEDLE, DISPOSABLE MISCELLANEOUS
Qty: 100 EACH | Refills: 1 | Status: SHIPPED | OUTPATIENT
Start: 2024-08-11

## 2024-08-19 NOTE — TELEPHONE ENCOUNTER
Patient is calling today, because she is almost out of her testing strips.  Jo is asking for the doctor to call them about the strips.    Please advise and call patient back.

## 2024-08-20 DIAGNOSIS — E11.9 TYPE 2 DIABETES MELLITUS WITHOUT COMPLICATION, WITH LONG-TERM CURRENT USE OF INSULIN (HCC): ICD-10-CM

## 2024-08-20 DIAGNOSIS — Z79.4 TYPE 2 DIABETES MELLITUS WITHOUT COMPLICATION, WITH LONG-TERM CURRENT USE OF INSULIN (HCC): ICD-10-CM

## 2024-08-20 NOTE — TELEPHONE ENCOUNTER
Patient is requesting the office call [id number] - [place], [place] - [address] -[phone].    Patient stated she called her insurance company was was told the above medication was approved and needs a verbal request.      Pt states that as of today the pharmacy hasn't received the script. Pt thought she requested the refill.  Pt is almost out of test strips   Please advise

## 2024-08-23 ENCOUNTER — TELEPHONE (OUTPATIENT)
Dept: FAMILY MEDICINE CLINIC | Facility: CLINIC | Age: 79
End: 2024-08-23

## 2024-08-26 NOTE — TELEPHONE ENCOUNTER
PA for Contour Next Test Strips SUBMITTED     via    [x]CM-KEY: [id number]  [][facility]-Case ID #   []Faxed to plan   []Other website   []Phone call Case ID #     Office notes sent, clinical questions answered. Awaiting determination    Turnaround time for your insurance to make a decision on your Prior Authorization can take 7-21 business days.             
PA for contour next test strips PARTIALLY APPROVED     Date(s) approved until 12/31/2024    Case #[id number]      Patient advised by          [] [email or web address] Message  [x] Phone call   [x]LMOM  []L/M to call office as no active Communication consent on file  []Unable to leave detailed message as VM not approved on Communication consent       Pharmacy advised by    []Fax  [x]Phone call    Approval letter scanned into Media Yes    
Patient requires auth for contour glucose strips, please initiate   
Spoke with PT. She was informed test strips require a PA. She said she hopes the PA could be done ASAP because she is out of test strips, and she has been requesting this for 2 weeks now.    Please advise    Thank You  
skin intact

## 2024-09-04 DIAGNOSIS — R09.89 RALES: ICD-10-CM

## 2024-09-04 DIAGNOSIS — R06.00 DYSPNEA, UNSPECIFIED TYPE: ICD-10-CM

## 2024-09-04 DIAGNOSIS — R13.10 DYSPHAGIA, UNSPECIFIED TYPE: ICD-10-CM

## 2024-09-04 DIAGNOSIS — J44.1 COPD EXACERBATION (HCC): ICD-10-CM

## 2024-09-04 RX ORDER — PANTOPRAZOLE SODIUM 40 MG/1
40 TABLET, DELAYED RELEASE ORAL DAILY
Qty: 30 TABLET | Refills: 5 | Status: SHIPPED | OUTPATIENT
Start: 2024-09-04

## 2024-09-04 NOTE — TELEPHONE ENCOUNTER
Pt called to request a refill on the Trelegy inhaler. I advise it is pending the doctors approval.

## 2024-09-05 RX ORDER — FLUTICASONE FUROATE, UMECLIDINIUM BROMIDE AND VILANTEROL TRIFENATATE 200; 62.5; 25 UG/1; UG/1; UG/1
POWDER RESPIRATORY (INHALATION)
Qty: 60 EACH | Refills: 1 | Status: SHIPPED | OUTPATIENT
Start: 2024-09-05

## 2024-09-17 DIAGNOSIS — G25.81 RESTLESS LEG: ICD-10-CM

## 2024-09-18 DIAGNOSIS — I25.10 3-VESSEL CAD: Primary | ICD-10-CM

## 2024-09-18 RX ORDER — ASPIRIN 81 MG/1
81 TABLET, COATED ORAL DAILY
Qty: 100 TABLET | Refills: 1 | Status: SHIPPED | OUTPATIENT
Start: 2024-09-18

## 2024-09-18 RX ORDER — PRAMIPEXOLE DIHYDROCHLORIDE 0.75 MG/1
0.75 TABLET ORAL EVERY EVENING
Qty: 30 TABLET | Refills: 5 | Status: SHIPPED | OUTPATIENT
Start: 2024-09-18

## 2024-09-18 NOTE — TELEPHONE ENCOUNTER
Reason for call:   [x] Refill   [] Prior Auth  [] Other:     Office:   [x] PCP/Provider - Toshia  [] Specialty/Provider -     Medication: Aspirin 81mg    Dose/Frequency: 1 tab daily    Quantity: 100    Pharmacy: [id number] - [place] - [address] -[phone]     Does the patient have enough for 3 days?   [] Yes   [x] No - Send as HP to POD

## 2024-10-13 DIAGNOSIS — E11.65 TYPE 2 DIABETES MELLITUS WITH HYPERGLYCEMIA, WITH LONG-TERM CURRENT USE OF INSULIN (HCC): ICD-10-CM

## 2024-10-13 DIAGNOSIS — Z79.4 TYPE 2 DIABETES MELLITUS WITH HYPERGLYCEMIA, WITH LONG-TERM CURRENT USE OF INSULIN (HCC): ICD-10-CM

## 2024-10-13 RX ORDER — INSULIN GLARGINE 100 [IU]/ML
INJECTION, SOLUTION SUBCUTANEOUS
Qty: 12 ML | Refills: 1 | Status: SHIPPED | OUTPATIENT
Start: 2024-10-13

## 2024-10-22 DIAGNOSIS — R05.8 PRODUCTIVE COUGH: ICD-10-CM

## 2024-10-23 RX ORDER — ALBUTEROL SULFATE 90 UG/1
2 AEROSOL, METERED RESPIRATORY (INHALATION) EVERY 6 HOURS PRN
Qty: 18 G | Refills: 5 | Status: SHIPPED | OUTPATIENT
Start: 2024-10-23

## 2024-11-06 ENCOUNTER — OFFICE VISIT (OUTPATIENT)
Dept: FAMILY MEDICINE CLINIC | Facility: CLINIC | Age: 79
End: 2024-11-06
Payer: COMMERCIAL

## 2024-11-06 VITALS
TEMPERATURE: 97.4 F | SYSTOLIC BLOOD PRESSURE: 130 MMHG | BODY MASS INDEX: 30.23 KG/M2 | HEART RATE: 103 BPM | DIASTOLIC BLOOD PRESSURE: 86 MMHG | HEIGHT: 58 IN | OXYGEN SATURATION: 95 % | WEIGHT: 144 LBS

## 2024-11-06 DIAGNOSIS — F33.0 MILD EPISODE OF RECURRENT MAJOR DEPRESSIVE DISORDER (HCC): ICD-10-CM

## 2024-11-06 DIAGNOSIS — I50.23 ACUTE ON CHRONIC SYSTOLIC (CONGESTIVE) HEART FAILURE (HCC): ICD-10-CM

## 2024-11-06 DIAGNOSIS — J06.9 ACUTE URI: Primary | ICD-10-CM

## 2024-11-06 PROCEDURE — G2211 COMPLEX E/M VISIT ADD ON: HCPCS | Performed by: NURSE PRACTITIONER

## 2024-11-06 PROCEDURE — 99214 OFFICE O/P EST MOD 30 MIN: CPT | Performed by: NURSE PRACTITIONER

## 2024-11-06 RX ORDER — METOPROLOL SUCCINATE 25 MG/1
50 TABLET, EXTENDED RELEASE ORAL DAILY
COMMUNITY
Start: 2024-11-01 | End: 2025-01-30

## 2024-11-06 RX ORDER — AZITHROMYCIN 250 MG/1
TABLET, FILM COATED ORAL
Qty: 6 TABLET | Refills: 0 | Status: SHIPPED | OUTPATIENT
Start: 2024-11-06 | End: 2024-11-10

## 2024-11-06 RX ORDER — BETAMETHASONE DIPROPIONATE 0.5 MG/G
OINTMENT, AUGMENTED TOPICAL
COMMUNITY
Start: 2024-11-05

## 2024-11-06 NOTE — PROGRESS NOTES
Ambulatory Visit  Name: Jayshree Bo      : 1945      MRN: [id number]  Encounter Provider: TASHA Yee  Encounter Date: 2024   Encounter department: [facility]    Assessment & Plan  Acute URI    Orders:    azithromycin (ZITHROMAX) 250 mg tablet; Take 2 tabs on Day 1 than 1 tab Days 2-5    Mild episode of recurrent major depressive disorder (HCC)  Patient currently exhibiting no signs or symptoms of depression         Acute on chronic systolic (congestive) heart failure (HCC)  Wt Readings from Last 3 Encounters:   24 65.3 kg (144 lb)   24 66.3 kg (146 lb 3.2 oz)   24 66.6 kg (146 lb 12.8 oz)   Patient is followed by cardiology - continue on current medication          History of Present Illness     79 y.o.female presenting with cough for the past 3 years. She reports having bloody and yellowish phlegm.      History obtained from : patient    Review of Systems   Constitutional:  Negative for chills, fatigue and fever.   HENT:  Positive for postnasal drip. Negative for congestion, ear pain, rhinorrhea, sinus pressure, sinus pain and sore throat.    Respiratory: Negative.     Cardiovascular: Negative.    Gastrointestinal: Negative.    Musculoskeletal: Negative.    Neurological: Negative.    Psychiatric/Behavioral: Negative.       Current Outpatient Medications on File Prior to Visit   Medication Sig Dispense Refill    Acetaminophen (TYLENOL ARTHRITIS PAIN PO) Take by mouth      ascorbic acid (VITAMIN C) 500 mg tablet Take by mouth daily      Aspirin Low Dose 81 MG EC tablet TAKE 1 TABLET BY MOUTH DAILY 100 tablet 1    BD Pen Needle Lili 2nd Gen 32G X 4 MM MISC USE 1 UNDER THE SKIN IN THE MORNING 100 each 1    betamethasone, augmented, (DIPROLENE) 0.05 % ointment       Blood Glucose Monitoring Suppl (Contour Next Monitor) w/Device KIT Use bid as directed 1 kit 1    chlordiazePOXIDE (LIBRIUM) 5 mg capsule Take 1 capsule (5 mg total) by mouth 2 (two)  times a day 180 capsule 1    clotrimazole-betamethasone (LOTRISONE) 1-0.05 % cream Apply topically 2 (two) times a day      Evolocumab 140 MG/ML SOAJ Inject 1 mL (140 mg total) under the skin every 14 (fourteen) days      ezetimibe (ZETIA) 10 mg tablet TAKE 1 TABLET BY MOUTH DAILY 30 tablet 5    furosemide (LASIX) 20 mg tablet TAKE 1 TABLET(20 MG) BY MOUTH DAILY 90 tablet 1    gabapentin (NEURONTIN) 100 mg capsule Take 1 capsule (100 mg total) by mouth 3 (three) times a day 90 capsule 3    glucose blood (Contour Next Test) test strip Patient tests blood sugars twice daily 300 each 0    imipramine (TOFRANIL) 25 mg tablet Take 1 tablet (25 mg total) by mouth 2 (two) times a day TAKE 1 TABLET BY MOUTH EVERY MORNING AND 2 TABLETS AT BEDTIME 270 tablet 3    Insulin Glargine Solostar (Lantus SoloStar) 100 UNIT/ML SOPN ADMINISTER 40 UNITS UNDER THE SKIN DAILY AT BEDTIME 12 mL 1    losartan (COZAAR) 50 mg tablet TAKE 1 TABLET BY MOUTH TWICE DAILY 180 tablet 1    metoprolol succinate (TOPROL-XL) 25 mg 24 hr tablet Take 50 mg by mouth daily      Microlet Lancets MISC Patient tests blood glucose twice daily 300 each 1    pantoprazole (PROTONIX) 40 mg tablet TAKE 1 TABLET BY MOUTH DAILY 30 tablet 5    polyethylene glycol (GLYCOLAX) 17 GM/SCOOP powder Take 17 g by mouth daily as needed (constipation) 510 g 0    pramipexole (MIRAPEX) 0.75 MG tablet TAKE 1 TABLET BY MOUTH EVERY EVENING 30 tablet 5    repaglinide (PRANDIN) 1 mg tablet Take 1 tablet (1 mg total) by mouth 3 (three) times a day before meals 90 tablet 5    traMADol (Ultram) 50 mg tablet Take 1 tablet (50 mg total) by mouth every 6 (six) hours as needed for moderate pain 20 tablet 0    Trelegy Ellipta 200-62.5-25 MCG/ACT AEPB inhaler INHALE 1 PUFF BY MOUTH DAILY. RINSE MOUTH AFTER USE 60 each 1    Vascepa 1 g CAPS Take 2 capsules by mouth 2 (two) times a day      Ventolin  (90 Base) MCG/ACT inhaler INHALE 2 PUFFS BY MOUTH EVERY 6 HOURS AS NEEDED FOR WHEEZING 18 g  "5    Vitamin E 400 units TABS Take 2 tablets by mouth daily      [DISCONTINUED] atenolol (TENORMIN) 25 mg tablet TAKE 1 TABLET BY MOUTH TWICE DAILY (Patient not taking: Reported on 11/6/2024) 180 tablet 1    [DISCONTINUED] triamcinolone (KENALOG) 0.1 % cream Apply topically 2 (two) times a day (Patient not taking: Reported on 8/14/2023) 30 g 0     No current facility-administered medications on file prior to visit.        Objective     /86 (BP Location: Left arm, Patient Position: Sitting, Cuff Size: Standard)   Pulse 103   Temp (!) 97.4 °F (36.3 °C)   Ht 4' 9.6\" (1.463 m)   Wt 65.3 kg (144 lb)   SpO2 95%   BMI 30.52 kg/m² (Reviewed)    Physical Exam  Vitals reviewed.   Constitutional:       General: She is not in acute distress.     Appearance: She is not ill-appearing.   HENT:      Head: Normocephalic and atraumatic.      Right Ear: External ear normal.      Left Ear: External ear normal.      Nose: Congestion present.      Mouth/Throat:      Mouth: Mucous membranes are moist.      Pharynx: Oropharynx is clear.   Eyes:      Extraocular Movements: Extraocular movements intact.      Conjunctiva/sclera: Conjunctivae normal.      Pupils: Pupils are equal, round, and reactive to light.   Cardiovascular:      Rate and Rhythm: Normal rate and regular rhythm.      Heart sounds: Normal heart sounds.   Pulmonary:      Effort: Pulmonary effort is normal.      Breath sounds: Normal breath sounds.   Abdominal:      General: Abdomen is flat. Bowel sounds are normal. There is no distension.      Palpations: Abdomen is soft.      Tenderness: There is no abdominal tenderness.   Musculoskeletal:      Cervical back: Neck supple.   Skin:     General: Skin is warm and dry.      Capillary Refill: Capillary refill takes less than 2 seconds.   Neurological:      Mental Status: She is alert and oriented to person, place, and time.   Psychiatric:         Mood and Affect: Mood normal.         Behavior: Behavior normal.         "

## 2024-11-08 DIAGNOSIS — J44.1 COPD EXACERBATION (HCC): ICD-10-CM

## 2024-11-08 DIAGNOSIS — R09.89 RALES: ICD-10-CM

## 2024-11-08 DIAGNOSIS — R06.00 DYSPNEA, UNSPECIFIED TYPE: ICD-10-CM

## 2024-11-08 RX ORDER — FLUTICASONE FUROATE, UMECLIDINIUM BROMIDE AND VILANTEROL TRIFENATATE 200; 62.5; 25 UG/1; UG/1; UG/1
POWDER RESPIRATORY (INHALATION)
Qty: 60 EACH | Refills: 1 | Status: SHIPPED | OUTPATIENT
Start: 2024-11-08

## 2024-11-08 NOTE — TELEPHONE ENCOUNTER
Patient called to request a refill for their Trelegy Ellipta 200-62.5-25 MCG/ACT   advised a refill was requested on 11/8/24 and is pending approval. Patient verbalized understanding and is in agreement.

## 2024-11-09 DIAGNOSIS — Z79.4 TYPE 2 DIABETES MELLITUS WITH HYPERGLYCEMIA, WITH LONG-TERM CURRENT USE OF INSULIN (HCC): ICD-10-CM

## 2024-11-09 DIAGNOSIS — E11.65 TYPE 2 DIABETES MELLITUS WITH HYPERGLYCEMIA, WITH LONG-TERM CURRENT USE OF INSULIN (HCC): ICD-10-CM

## 2024-11-11 RX ORDER — PEN NEEDLE, DIABETIC 32GX 5/32"
NEEDLE, DISPOSABLE MISCELLANEOUS
Qty: 100 EACH | Refills: 1 | Status: SHIPPED | OUTPATIENT
Start: 2024-11-11

## 2024-11-12 NOTE — TELEPHONE ENCOUNTER
Patient called in stating that she needed a refill of her BD pen needles. Informed her that a script was sent to her pharmacy yesterday for her. Patient verbalized understanding and will call her pharmacy.

## 2024-11-16 DIAGNOSIS — I25.10 3-VESSEL CAD: ICD-10-CM

## 2024-11-16 RX ORDER — FUROSEMIDE 20 MG/1
20 TABLET ORAL DAILY
Qty: 90 TABLET | Refills: 1 | Status: SHIPPED | OUTPATIENT
Start: 2024-11-16

## 2024-11-22 DIAGNOSIS — F41.9 ANXIETY: ICD-10-CM

## 2024-11-22 RX ORDER — CHLORDIAZEPOXIDE HYDROCHLORIDE 5 MG/1
5 CAPSULE, GELATIN COATED ORAL 2 TIMES DAILY
Qty: 180 CAPSULE | Refills: 1 | Status: SHIPPED | OUTPATIENT
Start: 2024-11-22

## 2024-11-22 NOTE — TELEPHONE ENCOUNTER
Reason for call:   [x] Refill   [] Prior Auth  [] Other:     Office:   [x] PCP/Provider - Ludwig Costello /[facility]   [] Specialty/Provider -     Medication:     chlordiazePOXIDE (LIBRIUM) 5 mg capsule       Dose/Frequency: Take 1 capsule (5 mg total) by mouth 2 (two) times a day,     Quantity: 180    Pharmacy: [place] [id number]    Does the patient have enough for 3 days?   [] Yes   [x] No - Send as HP to POD

## 2024-11-22 NOTE — TELEPHONE ENCOUNTER
1 [id number] 11/09/2024 06/06/2024 chlordiazePOXIDE HCL (Capsule) 60.0 30 5 MG NA DANNY CHOWDARY POGODZINSKI [facility] Private Pay 5 / 1 PA    1 [id number] 10/10/2024 06/06/2024 chlordiazePOXIDE HCL (Capsule) 60.0 30 5 MG DANNY NARANJO POGODZINSKI [facility], [facility] Private Pay 4 / 1 PA    1 [id number] 09/11/2024 06/06/2024 chlordiazePOXIDE HCL (Capsule) 60.0 30 5 MG DANNY NARANJO POGODZINSKI [facility], [facility]. Private Pay 3 / 1 PA    1 [id number] 08/02/2024 06/06/2024 chlordiazePOXIDE HCL (Capsule) 60.0 30 5 MG DANNY NARANJO POGODZINSKI [facility], [facility]. Private Pay 2 / 1 PA    1 [id number] 07/05/2024 06/06/2024 chlordiazePOXIDE HCL (Capsule) 60.0 30 5 MG DANNY NARANJO POGODZINSKI [facility], [facility]. Private Pay 1 / 1 PA    1 [id number] 06/10/2024 06/10/2024 traMADol HCL (Tablet) 20.0 5 50 MG 40.0 DANNY CHOWDARY POGODZINSKI [facility] Medicare 0 / 0 PA    1 [id number] 06/07/2024 06/06/2024 chlordiazePOXIDE HCL (Capsule) 60.0 30 5 MG DANNY NARANJO POGODZINSKI [facility], [facility]. Private Pay 0 / 1 PA    1 [id number] 05/10/2024 05/10/2024 chlordiazePOXIDE HCL (Capsule) 60.0 30 5 MG DANNY NARANJO POGODZINSKI [facility] Medicare 0 / 1 PA    1 [id number] 04/06/2024 01/10/2024 chlordiazePOXIDE HCL (Capsule) 60.0 30 5 MG DANNY NARANJO [facility]ODZINSUSC [facility]. Medicare 3 / 1 PA    1 [id number] 03/03/2024 01/10/2024 chlordiazePOXIDE HCL (Capsule) 60.0 30 5 MG NA [facility]OWDARY, [facility]. Medicare 2 / 1 PA

## 2024-12-08 DIAGNOSIS — Z79.4 TYPE 2 DIABETES MELLITUS WITH HYPERGLYCEMIA, WITH LONG-TERM CURRENT USE OF INSULIN (HCC): ICD-10-CM

## 2024-12-08 DIAGNOSIS — E11.65 TYPE 2 DIABETES MELLITUS WITH HYPERGLYCEMIA, WITH LONG-TERM CURRENT USE OF INSULIN (HCC): ICD-10-CM

## 2024-12-09 DIAGNOSIS — E11.65 TYPE 2 DIABETES MELLITUS WITH HYPERGLYCEMIA, WITH LONG-TERM CURRENT USE OF INSULIN (HCC): ICD-10-CM

## 2024-12-09 DIAGNOSIS — Z79.4 TYPE 2 DIABETES MELLITUS WITH HYPERGLYCEMIA, WITH LONG-TERM CURRENT USE OF INSULIN (HCC): ICD-10-CM

## 2024-12-09 RX ORDER — INSULIN GLARGINE 100 [IU]/ML
INJECTION, SOLUTION SUBCUTANEOUS
Qty: 12 ML | Refills: 5 | Status: SHIPPED | OUTPATIENT
Start: 2024-12-09

## 2024-12-09 RX ORDER — INSULIN GLARGINE 100 [IU]/ML
40 INJECTION, SOLUTION SUBCUTANEOUS DAILY
Qty: 12 ML | Refills: 1 | Status: SHIPPED | OUTPATIENT
Start: 2024-12-09

## 2024-12-09 NOTE — TELEPHONE ENCOUNTER
Ordering and Authorizing Provider:  Ludwig Pope MD-[facility]       Insulin Glargine Solostar (Lantus SoloStar) 100 UNIT/ML SOPN-ADMINISTER 40 UNITS UNDER THE SKIN DAILY at bedtime (pt reports taking differently daily in the morning)    [id number] - [place] - [address]    Refill requires for providers SIG

## 2024-12-20 DIAGNOSIS — Z79.4 TYPE 2 DIABETES MELLITUS WITH HYPERGLYCEMIA, WITH LONG-TERM CURRENT USE OF INSULIN (HCC): ICD-10-CM

## 2024-12-20 DIAGNOSIS — E11.65 TYPE 2 DIABETES MELLITUS WITH HYPERGLYCEMIA, WITH LONG-TERM CURRENT USE OF INSULIN (HCC): ICD-10-CM

## 2024-12-20 RX ORDER — REPAGLINIDE 1 MG/1
1 TABLET ORAL
Qty: 90 TABLET | Refills: 5 | Status: SHIPPED | OUTPATIENT
Start: 2024-12-20

## 2025-01-05 DIAGNOSIS — E78.2 MIXED HYPERLIPIDEMIA: ICD-10-CM

## 2025-01-05 DIAGNOSIS — I25.10 3-VESSEL CAD: ICD-10-CM

## 2025-01-07 RX ORDER — EZETIMIBE 10 MG/1
10 TABLET ORAL DAILY
Qty: 30 TABLET | Refills: 5 | Status: SHIPPED | OUTPATIENT
Start: 2025-01-07

## 2025-01-10 ENCOUNTER — TELEPHONE (OUTPATIENT)
Age: 80
End: 2025-01-10

## 2025-01-10 NOTE — TELEPHONE ENCOUNTER
Patient went the [facility] ER last night and is complaining of severe diarrhea. She was put on ondansetron 4 mg. She asked if doctor could review the hospital note and have a nurse call her back. Did not want to schedule apt at this time.

## 2025-01-11 ENCOUNTER — NURSE TRIAGE (OUTPATIENT)
Dept: OTHER | Facility: OTHER | Age: 80
End: 2025-01-11

## 2025-01-11 NOTE — TELEPHONE ENCOUNTER
"Regarding: Nausea/ Diarrhea  ----- Message from Lisa DELGADO sent at 1/11/2025  8:48 AM EST -----  Pt stated, \" I have been nauseous and have had diarrhea for 2 days. \"    "

## 2025-01-11 NOTE — TELEPHONE ENCOUNTER
Spoke with called on incoming call.  Pt reports diarrhea that started Thursday.  On Thursday she also had vomiting but  that has resolved.  Pt was seen at ED on Thursday and Friday and states she had IV fluids.  Pt felt a bit better and was discharged but then awoke at 0515 today with diarrhea again.  Pt states she then had another minor episode of diarrhea later after showering.  Pt reports no pain just severe nausea.  Pt is sipping water and Sprite and is making urine.  Advice offered per protocol and pt verbalized understanding.

## 2025-01-11 NOTE — TELEPHONE ENCOUNTER
"Regarding: Nausea/ Diarrhea  ----- Message from Malinda GA sent at 1/11/2025  9:31 AM EST -----  \"I missed a call from a nurse and I am calling back\"    "

## 2025-01-11 NOTE — TELEPHONE ENCOUNTER
"Reason for Disposition  • MILD-MODERATE diarrhea (e.g., 1-6 times / day more than normal)    Answer Assessment - Initial Assessment Questions  1. DIARRHEA SEVERITY: \"How bad is the diarrhea?\" \"How many more stools have you had in the past 24 hours than normal?\"       Had diarrhea on Thursday and went to ER and Friday and went to ER and was discharged as her diarrhea seemed resolved but awoke at 0515 today covered in diarrhea and then right after showering today had a small amount again    2. ONSET: \"When did the diarrhea begin?\"       Thursday   3. STOOL DESCRIPTION:  \"How loose or watery is the diarrhea?\" \"What is the stool color?\" \"Is there any blood or mucous in the stool?\"      Very watery and it \"just flies out of me\"   4. VOMITING: \"Are you also vomiting?\" If Yes, ask: \"How many times in the past 24 hours?\"       No vomiting   5. ABDOMEN PAIN: \"Are you having any abdomen pain?\" If Yes, ask: \"What does it feel like?\" (e.g., crampy, dull, intermittent, constant)       No pain just nausea   6. ABDOMEN PAIN SEVERITY: If present, ask: \"How bad is the pain?\"  (e.g., Scale 1-10; mild, moderate, or severe)      No pain   7. ORAL INTAKE: If vomiting, \"Have you been able to drink liquids?\" \"How much liquids have you had in the past 24 hours?\"      Is sipping on water and is making urine   8. HYDRATION: \"Any signs of dehydration?\" (e.g., dry mouth [not just dry lips], too weak to stand, dizziness, new weight loss) \"When did you last urinate?\"      Not feeling dehydrated   9. EXPOSURE: \"Have you traveled to a foreign country recently?\" \"Have you been exposed to anyone with diarrhea?\" \"Could you have eaten any food that was spoiled?\"      No recent travel   10. ANTIBIOTIC USE: \"Are you taking antibiotics now or have you taken antibiotics in the past 2 months?\"        No recent antibiotics   11. OTHER SYMPTOMS: \"Do you have any other symptoms?\" (e.g., fever, blood in stool)        Extreme nausea    Protocols used: " Diarrhea-Adult-AH

## 2025-01-12 DIAGNOSIS — R09.89 RALES: ICD-10-CM

## 2025-01-12 DIAGNOSIS — J44.1 COPD EXACERBATION (HCC): ICD-10-CM

## 2025-01-12 DIAGNOSIS — R06.00 DYSPNEA, UNSPECIFIED TYPE: ICD-10-CM

## 2025-01-13 NOTE — TELEPHONE ENCOUNTER
"Pt called to give update. Pt states that the vomiting and diarrhea stopped on Saturday. She states she was able to eat and tolerate a sandwich yesterday and that she has been making sure to sip on juice and ginger ale. Pt reports that she is urinating normally    Pt explains that she does not want to come into the office to be seen, because she is still feeling too weak. Pt also states that her stomach is still \"queasy\". Pt states that a family member suggested she try taking Dramamine for her stomach. Pt reports that she did take it this morning and that she does feel that it helped to settle her stomach    I explained to pt that I would message PCP with update and to see what he advises    "

## 2025-01-14 RX ORDER — FLUTICASONE FUROATE, UMECLIDINIUM BROMIDE AND VILANTEROL TRIFENATATE 200; 62.5; 25 UG/1; UG/1; UG/1
POWDER RESPIRATORY (INHALATION)
Qty: 60 EACH | Refills: 1 | Status: SHIPPED | OUTPATIENT
Start: 2025-01-14

## 2025-01-14 NOTE — TELEPHONE ENCOUNTER
Patient called, provided Dr Carrasco message. She stated that it all settled, she did end up taking Imodium and Dramamine. She is no longer having diarrhea or vomiting. Nausea has subsided. She began to eat yesterday she only feels weak currently from laying around for 4 days of being ill. She will call back if issues arises again. She also wanted Dr Carrasco to know she will be seeing a new cancer Dr tomorrow at [place] due to going back and forward to [place] is too much.

## 2025-01-23 DIAGNOSIS — E11.9 TYPE 2 DIABETES MELLITUS WITHOUT COMPLICATION, WITHOUT LONG-TERM CURRENT USE OF INSULIN (HCC): ICD-10-CM

## 2025-01-23 RX ORDER — LANCETS
EACH MISCELLANEOUS 2 TIMES DAILY
Qty: 300 EACH | Refills: 1 | Status: SHIPPED | OUTPATIENT
Start: 2025-01-23

## 2025-02-12 ENCOUNTER — TELEPHONE (OUTPATIENT)
Dept: FAMILY MEDICINE CLINIC | Facility: CLINIC | Age: 80
End: 2025-02-12

## 2025-02-12 ENCOUNTER — OFFICE VISIT (OUTPATIENT)
Dept: FAMILY MEDICINE CLINIC | Facility: CLINIC | Age: 80
End: 2025-02-12
Payer: COMMERCIAL

## 2025-02-12 VITALS
OXYGEN SATURATION: 95 % | SYSTOLIC BLOOD PRESSURE: 136 MMHG | BODY MASS INDEX: 29.49 KG/M2 | WEIGHT: 140.5 LBS | HEIGHT: 58 IN | DIASTOLIC BLOOD PRESSURE: 90 MMHG | HEART RATE: 99 BPM | TEMPERATURE: 97.5 F

## 2025-02-12 DIAGNOSIS — Z79.4 TYPE 2 DIABETES MELLITUS WITH HYPERGLYCEMIA, WITH LONG-TERM CURRENT USE OF INSULIN (HCC): ICD-10-CM

## 2025-02-12 DIAGNOSIS — E11.65 TYPE 2 DIABETES MELLITUS WITH HYPERGLYCEMIA, WITH LONG-TERM CURRENT USE OF INSULIN (HCC): ICD-10-CM

## 2025-02-12 DIAGNOSIS — M54.9 MID BACK PAIN: ICD-10-CM

## 2025-02-12 DIAGNOSIS — F33.0 MILD EPISODE OF RECURRENT MAJOR DEPRESSIVE DISORDER (HCC): Primary | ICD-10-CM

## 2025-02-12 PROBLEM — J41.0 SIMPLE CHRONIC BRONCHITIS (HCC): Status: RESOLVED | Noted: 2023-08-29 | Resolved: 2025-02-12

## 2025-02-12 PROCEDURE — G2211 COMPLEX E/M VISIT ADD ON: HCPCS | Performed by: FAMILY MEDICINE

## 2025-02-12 PROCEDURE — 99214 OFFICE O/P EST MOD 30 MIN: CPT | Performed by: FAMILY MEDICINE

## 2025-02-12 RX ORDER — IBUPROFEN 600 MG/1
600 TABLET, FILM COATED ORAL EVERY 6 HOURS PRN
COMMUNITY
Start: 2025-02-08 | End: 2025-02-19

## 2025-02-12 RX ORDER — METHOCARBAMOL 500 MG/1
500 TABLET, FILM COATED ORAL 4 TIMES DAILY PRN
COMMUNITY
Start: 2025-02-08 | End: 2025-02-19 | Stop reason: ALTCHOICE

## 2025-02-12 RX ORDER — IPRATROPIUM BROMIDE AND ALBUTEROL SULFATE 2.5; .5 MG/3ML; MG/3ML
SOLUTION RESPIRATORY (INHALATION)
COMMUNITY
Start: 2024-11-08

## 2025-02-12 RX ORDER — TRAMADOL HYDROCHLORIDE 50 MG/1
50 TABLET ORAL EVERY 6 HOURS PRN
Qty: 40 TABLET | Refills: 0 | Status: SHIPPED | OUTPATIENT
Start: 2025-02-12

## 2025-02-12 NOTE — PROGRESS NOTES
Name: Jayshree Bo      : 1945      MRN: [id number]  Encounter Provider: Ludwig Pope MD  Encounter Date: 2025   Encounter department: [facility]    Assessment & Plan  Mid back pain  I reviewed with pt. She states that she is not taking Tramadol.   Last refill was 2024.  Refilled tramadol as directed.  Recheck Wed  Orders:  •  traMADol (Ultram) 50 mg tablet; Take 1 tablet (50 mg total) by mouth every 6 (six) hours as needed for moderate pain    Type 2 diabetes mellitus with hyperglycemia, with long-term current use of insulin (HCC)  Unclear if patient is taking meds.  Attempts to review med list was unsuccessful - pt will return Wed with her meds to allow us to review.  Check A1C at that time.    Lab Results   Component Value Date    HGBA1C 10.2 (A) 2024          Mild episode of recurrent major depressive disorder (HCC)  Mood labile due to potential loss of her SI.  Unclear if pt is taking meds.  Will watch for now. Pt will return Wed with her meds for us to review.               History of Present Illness     Pt here for a couple of concerns  - pt with worsening mid back pain x 5d. Pt diagnosed with COVID on . Back pain may be worse due to cough. Pt seen at [facility] ED on  and .  I reviewed XR//study results.  Pt with old T11 compression fx, and arthritic changes, but no other findings.  Pt prescribed ibuprofen which has not been effective. Pain is worse with movement. No radiation. Pt has tramadol and gabapentin on her med list but pt is not sure if she is taking med  - pt with worsening mood. Her significant other diagnosed with terminal cancer and is on hospice.   - it does not appear that pt is seeing Endo. She says that she is compliant with meds. She is overdue for labs      Review of Systems   Constitutional:  Positive for activity change and chills.   Respiratory:  Positive for cough. Negative for shortness of breath.     Cardiovascular: Negative.    Musculoskeletal:  Positive for back pain, gait problem and myalgias.   Skin: Negative.    Neurological:  Negative for dizziness, weakness, light-headedness and numbness.     Past Medical History:   Diagnosis Date   • Gastroenteritis      Past Surgical History:   Procedure Laterality Date   • ATRIAL CARDIAC PACEMAKER INSERTION       Family History   Problem Relation Age of Onset   • Diabetes Sister    • Dementia Sister      Social History     Tobacco Use   • Smoking status: Former   • Smokeless tobacco: Never   Vaping Use   • Vaping status: Never Used   Substance and Sexual Activity   • Alcohol use: Not Currently   • Drug use: Never   • Sexual activity: Not on file     Current Outpatient Medications on File Prior to Visit   Medication Sig   • Acetaminophen (TYLENOL ARTHRITIS PAIN PO) Take by mouth   • ascorbic acid (VITAMIN C) 500 mg tablet Take by mouth daily   • Aspirin Low Dose 81 MG EC tablet TAKE 1 TABLET BY MOUTH DAILY   • BD Pen Needle Lili 2nd Gen 32G X 4 MM MISC USE TO INJECT DAILY IN THE MORNING   • betamethasone, augmented, (DIPROLENE) 0.05 % ointment    • Blood Glucose Monitoring Suppl (Contour Next Monitor) w/Device KIT Use bid as directed   • chlordiazePOXIDE (LIBRIUM) 5 mg capsule Take 1 capsule (5 mg total) by mouth 2 (two) times a day   • clotrimazole-betamethasone (LOTRISONE) 1-0.05 % cream Apply topically 2 (two) times a day   • Evolocumab 140 MG/ML SOAJ Inject 1 mL (140 mg total) under the skin every 14 (fourteen) days   • ezetimibe (ZETIA) 10 mg tablet TAKE 1 TABLET BY MOUTH DAILY   • furosemide (LASIX) 20 mg tablet TAKE 1 TABLET(20 MG) BY MOUTH DAILY   • gabapentin (NEURONTIN) 100 mg capsule Take 1 capsule (100 mg total) by mouth 3 (three) times a day (Patient taking differently: Take 100 mg by mouth 2 (two) times a day)   • ibuprofen (MOTRIN) 600 mg tablet Take 600 mg by mouth every 6 (six) hours as needed   • imipramine (TOFRANIL) 25 mg tablet Take 1 tablet (25  mg total) by mouth 2 (two) times a day TAKE 1 TABLET BY MOUTH EVERY MORNING AND 2 TABLETS AT BEDTIME (Patient taking differently: Take 25 mg by mouth 2 (two) times a day TAKE 1 TABLET BY MOUTH EVERY MORNING AND 1 TABLETS AT BEDTIME)   • Insulin Glargine Solostar (Lantus SoloStar) 100 UNIT/ML SOPN ADMINISTER 40 UNITS UNDER THE SKIN DAILY AT BEDTIME   • Insulin Glargine Solostar (Lantus SoloStar) 100 UNIT/ML SOPN Inject 0.4 mL (40 Units total) under the skin in the morning   • ipratropium-albuterol (DUO-NEB) 0.5-2.5 mg/3 mL nebulizer solution USE 3 ML VIA NEBULIZER FOUR TIMES DAILY AS NEEDED FOR WHEEZING   • losartan (COZAAR) 50 mg tablet TAKE 1 TABLET BY MOUTH TWICE DAILY   • methocarbamol (ROBAXIN) 500 mg tablet Take 500 mg by mouth 4 (four) times a day as needed   • metoprolol succinate (TOPROL-XL) 25 mg 24 hr tablet Take 50 mg by mouth daily   • Microlet Lancets MISC TEST TWICE DAILY   • pantoprazole (PROTONIX) 40 mg tablet TAKE 1 TABLET BY MOUTH DAILY   • polyethylene glycol (GLYCOLAX) 17 GM/SCOOP powder Take 17 g by mouth daily as needed (constipation)   • pramipexole (MIRAPEX) 0.75 MG tablet TAKE 1 TABLET BY MOUTH EVERY EVENING   • repaglinide (PRANDIN) 1 mg tablet TAKE 1 TABLET(1 MG) BY MOUTH THREE TIMES DAILY BEFORE MEALS   • Trelegy Ellipta 200-62.5-25 MCG/ACT AEPB inhaler INHALE 1 PUFF BY MOUTH DAILY. RINSE MOUTH AFTER USE   • Ventolin  (90 Base) MCG/ACT inhaler INHALE 2 PUFFS BY MOUTH EVERY 6 HOURS AS NEEDED FOR WHEEZING   • Vitamin E 400 units TABS Take 2 tablets by mouth daily   • glucose blood (Contour Next Test) test strip Patient tests blood sugars twice daily   • Vascepa 1 g CAPS Take 2 capsules by mouth 2 (two) times a day     Allergies   Allergen Reactions   • Atorvastatin      Reaction Date: 23Dec2011;   Unknown reaction  LFT increased   • Clarithromycin      Reaction Date: 23Dec2011;   Patient does not recall reaction   • Hydrogen Peroxide Swelling     Reaction Date: 23Dec2011;   Swelling  "in the mouth   • Levofloxacin      Patient does not recall her reaction   • Other Swelling     Perioxol (mouth rinse)  Perioxol (mouth rinse)   • Penicillins Hives and Swelling   • Rosuvastatin      Reaction Date: 23Dec2011;   Unknown reaction per patient  Increase LFT     Immunization History   Administered Date(s) Administered   • COVID-19 PFIZER VACCINE 0.3 ML IM 02/17/2021, 03/10/2021, 12/09/2021   • Tuberculin Skin Test-PPD Intradermal 04/14/2008     Objective   /90   Pulse 99   Temp 97.5 °F (36.4 °C)   Ht 4' 9.6\" (1.463 m)   Wt 63.7 kg (140 lb 8 oz)   SpO2 95%   BMI 29.77 kg/m²     Physical Exam  Vitals reviewed.   Constitutional:       Comments: Upset appearing female in NAD   HENT:      Head: Normocephalic.   Cardiovascular:      Rate and Rhythm: Normal rate and regular rhythm.   Pulmonary:      Effort: Pulmonary effort is normal.      Breath sounds: No wheezing or rales.   Musculoskeletal:         General: Tenderness (TTP over the low thoracic/upper lumbar spine. and paraspinal muscles.  sl spasm.) and deformity (increased thoracic kyphosis) present.      Right lower leg: No edema.      Left lower leg: No edema.   Skin:     Capillary Refill: Capillary refill takes less than 2 seconds.   Neurological:      Mental Status: She is alert.      Sensory: No sensory deficit.      Motor: No weakness.      Gait: Gait abnormal (antalgic appearing gait).   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

## 2025-02-12 NOTE — ASSESSMENT & PLAN NOTE
I reviewed with pt. She states that she is not taking Tramadol.   Last refill was June of 2024.  Refilled tramadol as directed.  Recheck Wed  Orders:  •  traMADol (Ultram) 50 mg tablet; Take 1 tablet (50 mg total) by mouth every 6 (six) hours as needed for moderate pain

## 2025-02-12 NOTE — ASSESSMENT & PLAN NOTE
Unclear if patient is taking meds.  Attempts to review med list was unsuccessful - pt will return Wed with her meds to allow us to review.  Check A1C at that time.    Lab Results   Component Value Date    HGBA1C 10.2 (A) 06/06/2024

## 2025-02-12 NOTE — TELEPHONE ENCOUNTER
Pt called in regarding extreme back pain, every time she moves she is in excruciating pain, causing her to cry . She stays up till 3 am trying to fall asleep but isnt able to get comfortable. Pt scheduled appt for tomorrow but would prefer to be seen today due to the pain.

## 2025-02-13 NOTE — TELEPHONE ENCOUNTER
Pt called in stating Dr. Carrasco asked her to call in today with an update on her pain. She says every time she moves, she screams.

## 2025-02-13 NOTE — TELEPHONE ENCOUNTER
Patient called in stated tramadol did not help was up all night and hurts to move or stand. Patient would like a call back. Thank you.

## 2025-02-14 ENCOUNTER — TELEPHONE (OUTPATIENT)
Dept: FAMILY MEDICINE CLINIC | Facility: CLINIC | Age: 80
End: 2025-02-14

## 2025-02-14 ENCOUNTER — TELEPHONE (OUTPATIENT)
Age: 80
End: 2025-02-14

## 2025-02-14 NOTE — TELEPHONE ENCOUNTER
Pt called back for an update. States that the tramadol that was given is not helping her pain. Pt would like to get an injection. Please advise.

## 2025-02-14 NOTE — TELEPHONE ENCOUNTER
Patient called stating she is having severe lower back pain and would like to know if she could get a shot today for her back or if theres somewhere she can go? Please call patient back asap.

## 2025-02-14 NOTE — ASSESSMENT & PLAN NOTE
Mood labile due to potential loss of her SI.  Unclear if pt is taking meds.  Will watch for now. Pt will return Wed with her meds for us to review.

## 2025-02-17 ENCOUNTER — TELEPHONE (OUTPATIENT)
Dept: FAMILY MEDICINE CLINIC | Facility: CLINIC | Age: 80
End: 2025-02-17

## 2025-02-17 ENCOUNTER — CLINICAL SUPPORT (OUTPATIENT)
Dept: FAMILY MEDICINE CLINIC | Facility: CLINIC | Age: 80
End: 2025-02-17
Payer: COMMERCIAL

## 2025-02-17 DIAGNOSIS — Z79.4 TYPE 2 DIABETES MELLITUS WITH HYPERGLYCEMIA, WITH LONG-TERM CURRENT USE OF INSULIN (HCC): Primary | ICD-10-CM

## 2025-02-17 DIAGNOSIS — B37.2 CANDIDAL DERMATITIS: Primary | ICD-10-CM

## 2025-02-17 DIAGNOSIS — E11.65 TYPE 2 DIABETES MELLITUS WITH HYPERGLYCEMIA, WITH LONG-TERM CURRENT USE OF INSULIN (HCC): Primary | ICD-10-CM

## 2025-02-17 DIAGNOSIS — G89.29 CHRONIC MIDLINE BACK PAIN, UNSPECIFIED BACK LOCATION: ICD-10-CM

## 2025-02-17 DIAGNOSIS — M54.9 CHRONIC MIDLINE BACK PAIN, UNSPECIFIED BACK LOCATION: ICD-10-CM

## 2025-02-17 LAB — SL AMB POCT HEMOGLOBIN AIC: 10.7 (ref ?–6.5)

## 2025-02-17 PROCEDURE — 83036 HEMOGLOBIN GLYCOSYLATED A1C: CPT

## 2025-02-17 PROCEDURE — 96372 THER/PROPH/DIAG INJ SC/IM: CPT

## 2025-02-17 RX ORDER — KETOROLAC TROMETHAMINE 30 MG/ML
30 INJECTION, SOLUTION INTRAMUSCULAR; INTRAVENOUS ONCE
Status: COMPLETED | OUTPATIENT
Start: 2025-02-17 | End: 2025-02-17

## 2025-02-17 RX ORDER — CLOTRIMAZOLE AND BETAMETHASONE DIPROPIONATE 10; .64 MG/G; MG/G
CREAM TOPICAL 2 TIMES DAILY
Qty: 45 G | Refills: 1 | Status: SHIPPED | OUTPATIENT
Start: 2025-02-17

## 2025-02-17 RX ADMIN — KETOROLAC TROMETHAMINE 30 MG: 30 INJECTION, SOLUTION INTRAMUSCULAR; INTRAVENOUS at 10:52

## 2025-02-17 NOTE — TELEPHONE ENCOUNTER
Pt was seen today for a nurse visit for a toradol injection. A1C was 10.7. Pt came into office upset stating there is a lot of things going wrong and she is in a lot of pain. Pt has a rash/ irritation under both breasts that has been painful for a few days now. Pt also has two cuts on right hand index finger and thumb that are dry/ cracked and will not heal. Pt had to go home due to boyfriend on ventilator but would like PCP opinion.

## 2025-02-19 ENCOUNTER — OFFICE VISIT (OUTPATIENT)
Dept: FAMILY MEDICINE CLINIC | Facility: CLINIC | Age: 80
End: 2025-02-19
Payer: COMMERCIAL

## 2025-02-19 VITALS
HEART RATE: 90 BPM | HEIGHT: 58 IN | BODY MASS INDEX: 29.6 KG/M2 | DIASTOLIC BLOOD PRESSURE: 74 MMHG | OXYGEN SATURATION: 96 % | TEMPERATURE: 97.2 F | SYSTOLIC BLOOD PRESSURE: 120 MMHG | WEIGHT: 141 LBS

## 2025-02-19 DIAGNOSIS — I50.22 CHRONIC SYSTOLIC HEART FAILURE (HCC): ICD-10-CM

## 2025-02-19 DIAGNOSIS — Z00.00 MEDICARE ANNUAL WELLNESS VISIT, SUBSEQUENT: Primary | ICD-10-CM

## 2025-02-19 DIAGNOSIS — E11.65 TYPE 2 DIABETES MELLITUS WITH HYPERGLYCEMIA, WITH LONG-TERM CURRENT USE OF INSULIN (HCC): ICD-10-CM

## 2025-02-19 DIAGNOSIS — F43.21 GRIEF: ICD-10-CM

## 2025-02-19 DIAGNOSIS — M54.9 MID BACK PAIN: ICD-10-CM

## 2025-02-19 DIAGNOSIS — E78.2 MIXED HYPERLIPIDEMIA: ICD-10-CM

## 2025-02-19 DIAGNOSIS — I10 PRIMARY HYPERTENSION: ICD-10-CM

## 2025-02-19 DIAGNOSIS — Z79.4 TYPE 2 DIABETES MELLITUS WITH HYPERGLYCEMIA, WITH LONG-TERM CURRENT USE OF INSULIN (HCC): ICD-10-CM

## 2025-02-19 DIAGNOSIS — G89.29 CHRONIC MIDLINE LOW BACK PAIN WITHOUT SCIATICA: ICD-10-CM

## 2025-02-19 DIAGNOSIS — I25.10 3-VESSEL CAD: ICD-10-CM

## 2025-02-19 DIAGNOSIS — M54.50 CHRONIC MIDLINE LOW BACK PAIN WITHOUT SCIATICA: ICD-10-CM

## 2025-02-19 PROCEDURE — G2211 COMPLEX E/M VISIT ADD ON: HCPCS | Performed by: FAMILY MEDICINE

## 2025-02-19 PROCEDURE — G0439 PPPS, SUBSEQ VISIT: HCPCS | Performed by: FAMILY MEDICINE

## 2025-02-19 PROCEDURE — 99214 OFFICE O/P EST MOD 30 MIN: CPT | Performed by: FAMILY MEDICINE

## 2025-02-19 NOTE — PATIENT INSTRUCTIONS
Medicare Preventive Visit Patient Instructions  Thank you for completing your Welcome to Medicare Visit or Medicare Annual Wellness Visit today. Your next wellness visit will be due in one year (2/20/2026).  The screening/preventive services that you may require over the next 5-10 years are detailed below. Some tests may not apply to you based off risk factors and/or age. Screening tests ordered at today's visit but not completed yet may show as past due. Also, please note that scanned in results may not display below.  Preventive Screenings:  Service Recommendations Previous Testing/Comments   Colorectal Cancer Screening  * Colonoscopy    * Fecal Occult Blood Test (FOBT)/Fecal Immunochemical Test (FIT)  * Fecal DNA/Cologuard Test  * Flexible Sigmoidoscopy Age: 45-75 years old   Colonoscopy: every 10 years (may be performed more frequently if at higher risk)  OR  FOBT/FIT: every 1 year  OR  Cologuard: every 3 years  OR  Sigmoidoscopy: every 5 years  Screening may be recommended earlier than age 45 if at higher risk for colorectal cancer. Also, an individualized decision between you and your healthcare provider will decide whether screening between the ages of 76-85 would be appropriate. Colonoscopy: Not on file  FOBT/FIT: Not on file  Cologuard: Not on file  Sigmoidoscopy: Not on file          Breast Cancer Screening Age: 40+ years old  Frequency: every 1-2 years  Not required if history of left and right mastectomy Mammogram: 11/13/2014        Cervical Cancer Screening Between the ages of 21-29, pap smear recommended once every 3 years.   Between the ages of 30-65, can perform pap smear with HPV co-testing every 5 years.   Recommendations may differ for women with a history of total hysterectomy, cervical cancer, or abnormal pap smears in past. Pap Smear: Not on file        Hepatitis C Screening Once for adults born between 1945 and 1965  More frequently in patients at high risk for Hepatitis C Hep C Antibody:  03/22/2019        Diabetes Screening 1-2 times per year if you're at risk for diabetes or have pre-diabetes Fasting glucose: 245 mg/dL (8/14/2023)  A1C: 10.7 (2/17/2025)      Cholesterol Screening Once every 5 years if you don't have a lipid disorder. May order more often based on risk factors. Lipid panel: 01/30/2025          Other Preventive Screenings Covered by Medicare:  Abdominal Aortic Aneurysm (AAA) Screening: covered once if your at risk. You're considered to be at risk if you have a family history of AAA.  Lung Cancer Screening: covers low dose CT scan once per year if you meet all of the following conditions: (1) Age 55-77; (2) No signs or symptoms of lung cancer; (3) Current smoker or have quit smoking within the last 15 years; (4) You have a tobacco smoking history of at least 20 pack years (packs per day multiplied by number of years you smoked); (5) You get a written order from a healthcare provider.  Glaucoma Screening: covered annually if you're considered high risk: (1) You have diabetes OR (2) Family history of glaucoma OR (3)  aged 50 and older OR (4)  American aged 65 and older  Osteoporosis Screening: covered every 2 years if you meet one of the following conditions: (1) You're estrogen deficient and at risk for osteoporosis based off medical history and other findings; (2) Have a vertebral abnormality; (3) On glucocorticoid therapy for more than 3 months; (4) Have primary hyperparathyroidism; (5) On osteoporosis medications and need to assess response to drug therapy.   Last bone density test (DXA Scan): 11/14/2022.  HIV Screening: covered annually if you're between the age of 15-65. Also covered annually if you are younger than 15 and older than 65 with risk factors for HIV infection. For pregnant patients, it is covered up to 3 times per pregnancy.    Immunizations:  Immunization Recommendations   Influenza Vaccine Annual influenza vaccination during flu season is  recommended for all persons aged >= 6 months who do not have contraindications   Pneumococcal Vaccine   * Pneumococcal conjugate vaccine = PCV13 (Prevnar 13), PCV15 (Vaxneuvance), PCV20 (Prevnar 20)  * Pneumococcal polysaccharide vaccine = PPSV23 (Pneumovax) Adults 19-63 yo with certain risk factors or if 65+ yo  If never received any pneumonia vaccine: recommend Prevnar 20 (PCV20)  Give PCV20 if previously received 1 dose of PCV13 or PPSV23   Hepatitis B Vaccine 3 dose series if at intermediate or high risk (ex: diabetes, end stage renal disease, liver disease)   Respiratory syncytial virus (RSV) Vaccine - COVERED BY MEDICARE PART D  * RSVPreF3 (Arexvy) CDC recommends that adults 60 years of age and older may receive a single dose of RSV vaccine using shared clinical decision-making (SCDM)   Tetanus (Td) Vaccine - COST NOT COVERED BY MEDICARE PART B Following completion of primary series, a booster dose should be given every 10 years to maintain immunity against tetanus. Td may also be given as tetanus wound prophylaxis.   Tdap Vaccine - COST NOT COVERED BY MEDICARE PART B Recommended at least once for all adults. For pregnant patients, recommended with each pregnancy.   Shingles Vaccine (Shingrix) - COST NOT COVERED BY MEDICARE PART B  2 shot series recommended in those 19 years and older who have or will have weakened immune systems or those 50 years and older     Health Maintenance Due:      Topic Date Due   • Breast Cancer Screening: Mammogram  11/13/2015   • DXA SCAN  11/14/2025   • Hepatitis C Screening  Completed     Immunizations Due:      Topic Date Due   • Pneumococcal Vaccine: 65+ Years (1 of 2 - PCV) Never done   • Influenza Vaccine (1) Never done   • COVID-19 Vaccine (4 - 2024-25 season) 09/01/2024     Advance Directives   What are advance directives?  Advance directives are legal documents that state your wishes and plans for medical care. These plans are made ahead of time in case you lose your  ability to make decisions for yourself. Advance directives can apply to any medical decision, such as the treatments you want, and if you want to donate organs.   What are the types of advance directives?  There are many types of advance directives, and each state has rules about how to use them. You may choose a combination of any of the following:  Living will:  This is a written record of the treatment you want. You can also choose which treatments you do not want, which to limit, and which to stop at a certain time. This includes surgery, medicine, IV fluid, and tube feedings.   Durable power of  for healthcare (DPAHC):  This is a written record that states who you want to make healthcare choices for you when you are unable to make them for yourself. This person, called a proxy, is usually a family member or a friend. You may choose more than 1 proxy.  Do not resuscitate (DNR) order:  A DNR order is used in case your heart stops beating or you stop breathing. It is a request not to have certain forms of treatment, such as CPR. A DNR order may be included in other types of advance directives.  Medical directive:  This covers the care that you want if you are in a coma, near death, or unable to make decisions for yourself. You can list the treatments you want for each condition. Treatment may include pain medicine, surgery, blood transfusions, dialysis, IV or tube feedings, and a ventilator (breathing machine).  Values history:  This document has questions about your views, beliefs, and how you feel and think about life. This information can help others choose the care that you would choose.  Why are advance directives important?  An advance directive helps you control your care. Although spoken wishes may be used, it is better to have your wishes written down. Spoken wishes can be misunderstood, or not followed. Treatments may be given even if you do not want them. An advance directive may make it easier  for your family to make difficult choices about your care.   Urinary Incontinence   Urinary incontinence (UI)  is when you lose control of your bladder. UI develops because your bladder cannot store or empty urine properly. The 3 most common types of UI are stress incontinence, urge incontinence, or both.  Medicines:   May be given to help strengthen your bladder control. Report any side effects of medication to your healthcare provider.  Do pelvic muscle exercises often:  Your pelvic muscles help you stop urinating. Squeeze these muscles tight for 5 seconds, then relax for 5 seconds. Gradually work up to squeezing for 10 seconds. Do 3 sets of 15 repetitions a day, or as directed. This will help strengthen your pelvic muscles and improve bladder control.  Train your bladder:  Go to the bathroom at set times, such as every 2 hours, even if you do not feel the urge to go. You can also try to hold your urine when you feel the urge to go. For example, hold your urine for 5 minutes when you feel the urge to go. As that becomes easier, hold your urine for 10 minutes.   Self-care:   Keep a UI record.  Write down how often you leak urine and how much you leak. Make a note of what you were doing when you leaked urine.  Drink liquids as directed. You may need to limit the amount of liquid you drink to help control your urine leakage. Do not drink any liquid right before you go to bed. Limit or do not have drinks that contain caffeine or alcohol.   Prevent constipation.  Eat a variety of high-fiber foods. Good examples are high-fiber cereals, beans, vegetables, and whole-grain breads. Walking is the best way to trigger your intestines to have a bowel movement.  Exercise regularly and maintain a healthy weight.  Weight loss and exercise will decrease pressure on your bladder and help you control your leakage.   Use a catheter as directed  to help empty your bladder. A catheter is a tiny, plastic tube that is put into your  bladder to drain your urine.   Go to behavior therapy as directed.  Behavior therapy may be used to help you learn to control your urge to urinate.    Weight Management   Why it is important to manage your weight:  Being overweight increases your risk of health conditions such as heart disease, high blood pressure, type 2 diabetes, and certain types of cancer. It can also increase your risk for osteoarthritis, sleep apnea, and other respiratory problems. Aim for a slow, steady weight loss. Even a small amount of weight loss can lower your risk of health problems.  How to lose weight safely:  A safe and healthy way to lose weight is to eat fewer calories and get regular exercise. You can lose up about 1 pound a week by decreasing the number of calories you eat by 500 calories each day.   Healthy meal plan for weight management:  A healthy meal plan includes a variety of foods, contains fewer calories, and helps you stay healthy. A healthy meal plan includes the following:  Eat whole-grain foods more often.  A healthy meal plan should contain fiber. Fiber is the part of grains, fruits, and vegetables that is not broken down by your body. Whole-grain foods are healthy and provide extra fiber in your diet. Some examples of whole-grain foods are whole-wheat breads and pastas, oatmeal, brown rice, and bulgur.  Eat a variety of vegetables every day.  Include dark, leafy greens such as spinach, kale, rox greens, and mustard greens. Eat yellow and orange vegetables such as carrots, sweet potatoes, and winter squash.   Eat a variety of fruits every day.  Choose fresh or canned fruit (canned in its own juice or light syrup) instead of juice. Fruit juice has very little or no fiber.  Eat low-fat dairy foods.  Drink fat-free (skim) milk or 1% milk. Eat fat-free yogurt and low-fat cottage cheese. Try low-fat cheeses such as mozzarella and other reduced-fat cheeses.  Choose meat and other protein foods that are low in fat.   Choose beans or other legumes such as split peas or lentils. Choose fish, skinless poultry (chicken or turkey), or lean cuts of red meat (beef or pork). Before you cook meat or poultry, cut off any visible fat.   Use less fat and oil.  Try baking foods instead of frying them. Add less fat, such as margarine, sour cream, regular salad dressing and mayonnaise to foods. Eat fewer high-fat foods. Some examples of high-fat foods include french fries, doughnuts, ice cream, and cakes.  Eat fewer sweets.  Limit foods and drinks that are high in sugar. This includes candy, cookies, regular soda, and sweetened drinks.  Exercise:  Exercise at least 30 minutes per day on most days of the week. Some examples of exercise include walking, biking, dancing, and swimming. You can also fit in more physical activity by taking the stairs instead of the elevator or parking farther away from stores. Ask your healthcare provider about the best exercise plan for you.      © Copyright [employer] 2018 Information is for End User's use only and may not be sold, redistributed or otherwise used for commercial purposes. All illustrations and images included in CareNotes® are the copyrighted property of [employer] or [employer]

## 2025-02-19 NOTE — PROGRESS NOTES
Name: Jayshree Bo      : 1945      MRN: [id number]  Encounter Provider: Ludwig Pope MD  Encounter Date: 2025   Encounter department: [facility]  Medicare annual wellness visit, subsequent       Chronic midline low back pain without sciatica  With exacerbation.  Failed Toradol injection and Tramadol.  To see pain management tomorrow for injections. F/u 1w if not improving       Mid back pain  With exacerbation.  Failed Toradol injection and Tramadol.  To see pain management tomorrow for injections. F/u 1w if not improving          Grief  I reviewed with pt. Pt may have counseling available to her through hospice - advised pt to consider.  Continue imipramine. Recheck 3-4w if not improving - earlier if worse       3-vessel CAD  Pt without symptoms. Continue present care (reviewed). I reviewed recent labs resuts with pt. Recheck 6w       Chronic systolic heart failure (HCC)  Wt Readings from Last 3 Encounters:   25 64 kg (141 lb)   25 63.7 kg (140 lb 8 oz)   24 65.3 kg (144 lb)   I reviewed with pt. Appears euvolemic. Recent labs reviewed. Continue present care. Recheck 6w         Primary hypertension  Well controlled. Cont present treatment. Monitor labs. Recheck 6m         Type 2 diabetes mellitus with hyperglycemia, with long-term current use of insulin (HCC)  Poor control. Pt notes that BG have been low lately due to decreased appetite.  No low BG episodes. Continue present care for now. Will recheck 6w - adjust treatment at that time once appetite stabilizes  Lab Results   Component Value Date    HGBA1C 10.7 (A) 2025          Mixed hyperlipidemia  At goal.  Continue present care.  Recheck 6m            Preventive health issues were discussed with patient, and age appropriate screening tests were ordered as noted in patient's After Visit Summary. Personalized health advice and appropriate referrals for health  education or preventive services given if needed, as noted in patient's After Visit Summary.    History of Present Illness     f/u multiple med issues  - pt still suffering from back pain. Failed Toradol 30mg IM and tramadol.  Pt to have injection tomorrow at [facility].   - mood worse due to pain as well as losing her boyfriend to cancer yesterday.  Feels depressed but denies suicidal thought/plan  - pt is overdue to see Endo.  A1C last week = 10.7.  Pt compliant with meds (pt brought all meds in to reconcile)  - pt denies CP, palpitations, lightheadedness or other CV symptoms with or without exertion  - pt denies any new GI or  complaints       Patient Care Team:  Ludwig Pope MD as PCP - General  Gail Yan MD    Review of Systems   Constitutional:  Positive for activity change, appetite change and fatigue. Negative for chills and fever.   HENT: Negative.     Eyes: Negative.    Respiratory: Negative.     Cardiovascular: Negative.    Gastrointestinal: Negative.    Genitourinary: Negative.    Musculoskeletal:  Positive for arthralgias, back pain (severe), gait problem and myalgias.   Skin: Negative.    Neurological:  Negative for dizziness, weakness, light-headedness, numbness and headaches.   Psychiatric/Behavioral:  Positive for dysphoric mood and sleep disturbance. Negative for confusion. The patient is nervous/anxious.      Medical History Reviewed by provider this encounter:  Tobacco  Allergies  Meds  Problems  Med Hx  Surg Hx  Fam Hx       Annual Wellness Visit Questionnaire   Jayshree is here for her Subsequent Wellness visit. Last Medicare Wellness visit information reviewed, patient interviewed and updates made to the record today.      Health Risk Assessment:   Patient rates overall health as poor. Patient feels that their physical health rating is much worse. Patient is dissatisfied with their life. Eyesight was rated as same. Hearing was rated as same. Patient feels that their  emotional and mental health rating is slightly worse. Patients states they are never, rarely angry. Patient states they are often unusually tired/fatigued. Pain experienced in the last 7 days has been a lot. Patient's pain rating has been 9/10. Patient states that she has experienced no weight loss or gain in last 6 months. Back pain to see pain management at [facility] tomorrow for injection    Depression Screening:   PHQ-9 Score: 12      Fall Risk Screening:   In the past year, patient has experienced: no history of falling in past year      Urinary Incontinence Screening:   Patient has not leaked urine accidently in the last six months.     Home Safety:  Patient has trouble with stairs inside or outside of their home. Patient has working smoke alarms and has working carbon monoxide detector. Home safety hazards include: none.     Nutrition:   Current diet is Regular.     Medications:   Patient is currently taking over-the-counter supplements. OTC medications include: see medication list. Patient is able to manage medications.     Activities of Daily Living (ADLs)/Instrumental Activities of Daily Living (IADLs):   Walk and transfer into and out of bed and chair?: Yes  Dress and groom yourself?: Yes    Bathe or shower yourself?: Yes    Feed yourself? Yes  Do your laundry/housekeeping?: Yes  Manage your money, pay your bills and track your expenses?: Yes  Make your own meals?: Yes    Do your own shopping?: Yes    Previous Hospitalizations:   Any hospitalizations or ED visits within the last 12 months?: Yes    How many hospitalizations have you had in the last year?: 1-2    Advance Care Planning:   Living will: Yes    Durable POA for healthcare: Yes    Advanced directive: Yes    Advanced directive counseling given: Yes      Cognitive Screening:   Provider or family/friend/caregiver concerned regarding cognition?: No    PREVENTIVE SCREENINGS      Cardiovascular Screening:    General: Screening Not Indicated and  History Lipid Disorder      Diabetes Screening:     General: History Diabetes and Screening Not Indicated      Colorectal Cancer Screening:     General: Patient Declines      Breast Cancer Screening:     General: Patient Declines      Cervical Cancer Screening:    General: Screening Not Indicated      Osteoporosis Screening:    General: Screening Not Indicated and History Osteoporosis      Abdominal Aortic Aneurysm (AAA) Screening:        General: Screening Not Indicated      Lung Cancer Screening:     General: Screening Not Indicated      Hepatitis C Screening:    General: Screening Current    Screening, Brief Intervention, and Referral to Treatment (SBIRT)     Screening      AUDIT-C Screenin) How often did you have a drink containing alcohol in the past year? never  2) How many drinks did you have on a typical day when you were drinking in the past year? 0  3) How often did you have 6 or more drinks on one occasion in the past year? never    AUDIT-C Score: 0  Interpretation: Score 0-2 (female): Negative screen for alcohol misuse    Single Item Drug Screening:  How often have you used an illegal drug (including marijuana) or a prescription medication for non-medical reasons in the past year? never    Single Item Drug Screen Score: 0  Interpretation: Negative screen for possible drug use disorder    Time Spent  Time spent screening/evaluating the patient for alcohol misuse: 1 minutes.     Review of Current Opioid Use  Opioid Risk Tool (ORT) Score: 1  Opioid Risk Tool (ORT) Interpretation: Score 0-3: Low risk for opioid misuse    Other Counseling Topics:   Calcium and vitamin D intake and regular weightbearing exercise.     Social Drivers of Health     Financial Resource Strain: Low Risk  (2023)    Overall Financial Resource Strain (CARDIA)    • Difficulty of Paying Living Expenses: Not hard at all   Food Insecurity: No Food Insecurity (2025)    Hunger Vital Sign    • Worried About Running Out of Food  "in the Last Year: Never true    • Ran Out of Food in the Last Year: Never true   Transportation Needs: No Transportation Needs (2/19/2025)    PRAPARE - Transportation    • Lack of Transportation (Medical): No    • Lack of Transportation (Non-Medical): No   Housing Stability: Low Risk  (2/19/2025)    Housing Stability Vital Sign    • Unable to Pay for Housing in the Last Year: No    • Number of Times Moved in the Last Year: 0    • Homeless in the Last Year: No   Utilities: Not At Risk (2/19/2025)    [facility] Utilities    • Threatened with loss of utilities: No     No results found.    Objective   /74   Pulse 90   Temp (!) 97.2 °F (36.2 °C)   Ht 4' 9.6\" (1.463 m)   Wt 64 kg (141 lb)   SpO2 96%   BMI 29.88 kg/m²     Physical Exam  Vitals reviewed.   Constitutional:       Comments: Upset and uncomfortable appearing female in NAD   HENT:      Head: Normocephalic.      Right Ear: Tympanic membrane, ear canal and external ear normal.      Left Ear: Tympanic membrane, ear canal and external ear normal.      Nose: Nose normal.      Mouth/Throat:      Mouth: Mucous membranes are moist.   Eyes:      Extraocular Movements: Extraocular movements intact.      Conjunctiva/sclera: Conjunctivae normal.      Pupils: Pupils are equal, round, and reactive to light.   Cardiovascular:      Pulses: Pulses are weak.           Dorsalis pedis pulses are 0 on the right side and 0 on the left side.        Posterior tibial pulses are 0 on the right side and 0 on the left side.   Pulmonary:      Effort: Pulmonary effort is normal.   Abdominal:      General: There is no distension.      Palpations: There is no mass.      Tenderness: There is no abdominal tenderness.   Musculoskeletal:         General: Tenderness and deformity present.      Cervical back: Normal range of motion. No tenderness.      Right lower leg: No edema.      Left lower leg: No edema.      Comments: Pt TTP along low thoracic and lumbar spine and paraspinal muscles. " Increased pain with rotation and flexion.    Feet:      Right foot:      Skin integrity: No ulcer, skin breakdown, erythema, warmth, callus or dry skin.      Left foot:      Skin integrity: No ulcer, skin breakdown, erythema, warmth, callus or dry skin.   Lymphadenopathy:      Cervical: No cervical adenopathy.   Skin:     General: Skin is warm.   Neurological:      General: No focal deficit present.      Mental Status: She is alert and oriented to person, place, and time.      Sensory: No sensory deficit.      Motor: No weakness (symmetrical in legs).      Gait: Gait abnormal (antalgic appearing gait).      Deep Tendon Reflexes: Reflexes normal.   Psychiatric:         Behavior: Behavior normal.         Thought Content: Thought content normal.         Judgment: Judgment normal.     Patient's shoes and socks removed.    Right Foot/Ankle   Right Foot Inspection  Skin Exam: skin normal and skin intact. No dry skin, no warmth, no callus, no erythema, no maceration, no abnormal color, no pre-ulcer, no ulcer and no callus.     Toe Exam: ROM and strength within normal limits.     Sensory   Vibration: intact  Monofilament testing: intact    Vascular  Capillary refills: < 3 seconds  The right DP pulse is 0. The right PT pulse is 0.     Left Foot/Ankle  Left Foot Inspection  Skin Exam: skin normal and skin intact. No dry skin, no warmth, no erythema, no maceration, normal color, no pre-ulcer, no ulcer and no callus.     Toe Exam: ROM and strength within normal limits.     Sensory   Vibration: intact  Monofilament testing: intact    Vascular  Capillary refills: < 3 seconds  The left DP pulse is 0. The left PT pulse is 0.     Assign Risk Category  No deformity present  No loss of protective sensation  Weak pulses  Risk: 0

## 2025-02-22 PROBLEM — F43.21 GRIEF: Status: ACTIVE | Noted: 2025-02-22

## 2025-02-22 NOTE — ASSESSMENT & PLAN NOTE
With exacerbation.  Failed Toradol injection and Tramadol.  To see pain management tomorrow for injections. F/u 1w if not improving

## 2025-02-22 NOTE — ASSESSMENT & PLAN NOTE
I reviewed with pt. Pt may have counseling available to her through hospice - advised pt to consider.  Continue imipramine. Recheck 3-4w if not improving - earlier if worse

## 2025-02-22 NOTE — ASSESSMENT & PLAN NOTE
Poor control. Pt notes that BG have been low lately due to decreased appetite.  No low BG episodes. Continue present care for now. Will recheck 6w - adjust treatment at that time once appetite stabilizes  Lab Results   Component Value Date    HGBA1C 10.7 (A) 02/17/2025

## 2025-02-22 NOTE — ASSESSMENT & PLAN NOTE
Pt without symptoms. Continue present care (reviewed). I reviewed recent labs resuts with pt. Recheck 6w

## 2025-02-22 NOTE — ASSESSMENT & PLAN NOTE
Wt Readings from Last 3 Encounters:   02/19/25 64 kg (141 lb)   02/12/25 63.7 kg (140 lb 8 oz)   11/06/24 65.3 kg (144 lb)   I reviewed with pt. Appears euvolemic. Recent labs reviewed. Continue present care. Recheck 6w

## 2025-03-02 DIAGNOSIS — R13.10 DYSPHAGIA, UNSPECIFIED TYPE: ICD-10-CM

## 2025-03-03 RX ORDER — PANTOPRAZOLE SODIUM 40 MG/1
40 TABLET, DELAYED RELEASE ORAL DAILY
Qty: 30 TABLET | Refills: 5 | Status: SHIPPED | OUTPATIENT
Start: 2025-03-03

## 2025-03-07 ENCOUNTER — TRANSITIONAL CARE MANAGEMENT (OUTPATIENT)
Dept: FAMILY MEDICINE CLINIC | Facility: CLINIC | Age: 80
End: 2025-03-07

## 2025-03-13 ENCOUNTER — TELEPHONE (OUTPATIENT)
Age: 80
End: 2025-03-13

## 2025-03-13 NOTE — TELEPHONE ENCOUNTER
Patient called states was in hospital  and now is in rehab for physical therapy has back fracture. Cancelled 4-9-2025 appointment will call back when discharged.

## 2025-03-26 DIAGNOSIS — G25.81 RESTLESS LEG: ICD-10-CM

## 2025-03-27 RX ORDER — PRAMIPEXOLE DIHYDROCHLORIDE 0.75 MG/1
0.75 TABLET ORAL EVERY EVENING
Qty: 30 TABLET | Refills: 5 | Status: SHIPPED | OUTPATIENT
Start: 2025-03-27

## 2025-04-17 ENCOUNTER — DOCUMENTATION (OUTPATIENT)
Dept: ADMINISTRATIVE | Facility: OTHER | Age: 80
End: 2025-04-17

## 2025-04-17 NOTE — PROGRESS NOTES
04/17/25 3:58 PM    Annual Wellness Visit outreach is not required, an AWV was completed at the PCP office.    Thank you.  Kirill Saleh MA  [facility]

## 2025-05-01 DIAGNOSIS — F41.9 ANXIETY: ICD-10-CM

## 2025-05-01 RX ORDER — IMIPRAMINE HYDROCHLORIDE 25 MG/1
TABLET, FILM COATED ORAL
Qty: 270 TABLET | Refills: 1 | Status: SHIPPED | OUTPATIENT
Start: 2025-05-01

## 2025-05-25 DIAGNOSIS — I25.10 3-VESSEL CAD: ICD-10-CM

## 2025-05-25 RX ORDER — FUROSEMIDE 20 MG/1
20 TABLET ORAL DAILY
Qty: 90 TABLET | Refills: 1 | Status: SHIPPED | OUTPATIENT
Start: 2025-05-25

## 2025-05-27 ENCOUNTER — VBI (OUTPATIENT)
Dept: ADMINISTRATIVE | Facility: OTHER | Age: 80
End: 2025-05-27

## 2025-05-27 NOTE — TELEPHONE ENCOUNTER
05/27/25 3:05 PM    Patient was called to schedule a diabetic follow up apointment ; a message was left for the patient to return the call.    Thank you.  George Wilkins MA  [facility]

## 2025-06-12 DIAGNOSIS — I10 PRIMARY HYPERTENSION: ICD-10-CM

## 2025-06-12 RX ORDER — LOSARTAN POTASSIUM 50 MG/1
50 TABLET ORAL 2 TIMES DAILY
Qty: 180 TABLET | Refills: 1 | Status: SHIPPED | OUTPATIENT
Start: 2025-06-12

## 2025-06-13 NOTE — TELEPHONE ENCOUNTER
Patient was returning a missed call, I advised her of the previous message and she said shes been in the [facility] for the last 3 months and she will call back to schedule once she gets out which should be next week. She also said her address changed to [address] [place] [address] in [facility] and her pharmacy will be changing also but she will call back to advise when. Thank you.

## 2025-06-23 ENCOUNTER — TELEPHONE (OUTPATIENT)
Dept: FAMILY MEDICINE CLINIC | Facility: CLINIC | Age: 80
End: 2025-06-23

## 2025-06-23 NOTE — TELEPHONE ENCOUNTER
Pt is being discharged from rehab center tomorrow 6/24. Appt scheduled.   Please call pt for questions once discharged

## 2025-06-24 ENCOUNTER — TELEPHONE (OUTPATIENT)
Age: 80
End: 2025-06-24

## 2025-06-24 NOTE — TELEPHONE ENCOUNTER
Karri from [facility] called to verify if Dr Carrasco would sign off on home health care for PT. I was unable to reach the office. Please call Karri to inform him if Dr Carrasco would be agreeable or not.    Alexa

## 2025-06-25 ENCOUNTER — TELEPHONE (OUTPATIENT)
Age: 80
End: 2025-06-25

## 2025-06-25 ENCOUNTER — TRANSITIONAL CARE MANAGEMENT (OUTPATIENT)
Dept: FAMILY MEDICINE CLINIC | Facility: CLINIC | Age: 80
End: 2025-06-25

## 2025-06-25 DIAGNOSIS — Z79.4 TYPE 2 DIABETES MELLITUS WITHOUT COMPLICATION, WITH LONG-TERM CURRENT USE OF INSULIN (HCC): ICD-10-CM

## 2025-06-25 DIAGNOSIS — E11.9 TYPE 2 DIABETES MELLITUS WITHOUT COMPLICATION, WITHOUT LONG-TERM CURRENT USE OF INSULIN (HCC): ICD-10-CM

## 2025-06-25 DIAGNOSIS — Z79.4 TYPE 2 DIABETES MELLITUS WITH HYPERGLYCEMIA, WITH LONG-TERM CURRENT USE OF INSULIN (HCC): ICD-10-CM

## 2025-06-25 DIAGNOSIS — E11.9 TYPE 2 DIABETES MELLITUS WITHOUT COMPLICATION, WITH LONG-TERM CURRENT USE OF INSULIN (HCC): ICD-10-CM

## 2025-06-25 DIAGNOSIS — E11.65 TYPE 2 DIABETES MELLITUS WITH HYPERGLYCEMIA, WITH LONG-TERM CURRENT USE OF INSULIN (HCC): ICD-10-CM

## 2025-06-25 RX ORDER — PEN NEEDLE, DIABETIC 32GX 5/32"
NEEDLE, DISPOSABLE MISCELLANEOUS EVERY MORNING
Qty: 100 EACH | Refills: 1 | Status: SHIPPED | OUTPATIENT
Start: 2025-06-25

## 2025-06-25 RX ORDER — INSULIN GLARGINE 100 [IU]/ML
40 INJECTION, SOLUTION SUBCUTANEOUS
Qty: 30 ML | Refills: 1 | Status: SHIPPED | OUTPATIENT
Start: 2025-06-25

## 2025-06-25 RX ORDER — LANCETS
EACH MISCELLANEOUS
Qty: 200 EACH | Refills: 1 | Status: SHIPPED | OUTPATIENT
Start: 2025-06-25

## 2025-06-25 NOTE — TELEPHONE ENCOUNTER
FABIAN for a return call. Facility scheduled the TCM and did the q's. Please advise her of the appt time in case they did not make her aware. Unfortunately there is nothing sooner.

## 2025-06-25 NOTE — TELEPHONE ENCOUNTER
[facility]/pharmacy #[id number] - [place] - [id number] called while patient was there. She was using [facility] and wanted to have her scripts moved to [facility] on [address].     She needs to have the pended medications and diabetic testing equipment filled. Patient was recently released from rehab and has a TCM appointment scheduled for 7/3.

## 2025-06-25 NOTE — TELEPHONE ENCOUNTER
Patient called and stated she was discharged yesterday from Rehab for almost three months and now lives alone significant other passed way in February.Patient called  very nervous ,shaking and anxious. .Patient stated while she was in rehab her children [place]. Patient stated she wanted to be seen today Advised patient no appts were available at the office.Patient sounded at ease at end of call. Advised patient that [facility] will calling to set up physical therapy.Patient also  stated that  her blood glucose  monitor was out of batteries ,patients friend went to get batteries and will call back with the reading. Patient denies dizziness,lightheadedness and sweating Patient stated she ate breakfast this morning. Patient stated that she will call back if symptoms worsen.

## 2025-06-26 ENCOUNTER — RA CDI HCC (OUTPATIENT)
Dept: OTHER | Facility: HOSPITAL | Age: 80
End: 2025-06-26

## 2025-06-26 NOTE — PROGRESS NOTES
HCC coding opportunities          Chart Reviewed number of suggestions sent to Provider: 2  I11.0  I42.9     Patients Insurance     Medicare Insurance: [employer]

## 2025-06-30 ENCOUNTER — TELEPHONE (OUTPATIENT)
Age: 80
End: 2025-06-30

## 2025-06-30 NOTE — TELEPHONE ENCOUNTER
Received call from Zenaida from [facility] to inform provider of elevated blood pressure today: blood pressure=172/76 heart rate=80; asymptomatic. Patient has the following prescribed:   losartan (COZAAR) 50 mg tablet twice a day,   furosemide (LASIX) 20 mg tablet  daily, and   metoprolol succinate (TOPROL-XL) 25 mg 24 hr tablet prescribed by other provider; last order 5/8/25. On therapist's list; please notify if patient is not continuing medication so it can be removed from their list.  Patient just moved and is still upset over new residence. RN inquired about order for   imipramine (TOFRANIL) 25 mg tablet for anxiety. Currently not on patient's discharge list. Renew order? Has refill available.     Patient was discharge from skilled nursing on Tues 6/24 post 3 month rehab for back surgery. Patient has TCM OV scheduled for Thurs 7/3 at 3 PM.    Please follow up with patient for any medication concerns/changes or provider response and also with Zenaida to update their list of patient medications.

## 2025-07-01 NOTE — TELEPHONE ENCOUNTER
Patient called back and PCP question was asked and Jayshree stated she is hanging in there. We discussed her medications and she stated she is taking her medications and will see PCP Thursday.

## 2025-07-02 DIAGNOSIS — F41.9 ANXIETY: ICD-10-CM

## 2025-07-02 NOTE — TELEPHONE ENCOUNTER
Reason for call:   [x] Refill   [] Prior Auth  [] Other:     Office:   [x] PCP/Provider -   [] Specialty/Provider -     Medication: chlordiazePOXIDE (LIBRIUM) 5 mg capsule     Dose/Frequency: Take 1 capsule (5 mg total) by mouth 2 (two) times a day     Quantity: 180    Pharmacy: [facility]   Does the patient have enough for 3 days?   [x] Yes   [] No - Send as HP to POD    Mail Away Pharmacy   Does the patient have enough for 10 days?   [] Yes   [] No - Send as HP to POD

## 2025-07-02 NOTE — TELEPHONE ENCOUNTER
1 [id number] 05/23/2025 05/23/2025 05/23/2025 oxyCODONE HCL (Tablet) 30.0 3 5 MG 75.0 VITALY BRY PHARMERICA Medicare 0 / 99 PA    1 [id number] 05/12/2025 05/12/2025 05/12/2025 oxyCODONE HCL (Tablet) 30.0 2 5 .50 KRISTI KREISEL PHARMER[employer] Medicare 0 / 99 PA    1 [id number] 05/06/2025 05/06/2025 05/06/2025 Morphine Sulfate (Tablet, Extended Release) 60.0 30 15 MG 30.0 KRISTI KREISEL [employer] Medicare 0 / 99 PA    1 [id number] 04/29/2025 04/29/2025 04/29/2025 oxyCODONE HCL (Tablet) 30.0 2 5 .50 KRISTI KREISEL [employer] Medicare 0 / 99 PA    1 [id number] 04/29/2025 04/29/2025 04/29/2025 OxyCONTIN (Tablet, Extended Release) 14.0 7 15 MG 45.0 KRISTI KREISEL [employer] Private Pay 0 / 0 PA    1 [id number] 04/25/2025 04/25/2025 04/25/2025 OxyCONTIN (Tablet, Extended Release) 6.0 3 15 MG 45.0 KIMMY ESPINAL [employer] Medicare 0 / 99 PA    1 [id number] 04/16/2025 04/16/2025 04/16/2025 OxyCONTIN (Tablet, Extended Release) 20.0 10 10 MG 30.0 KENNETH CLAUDETTE [employer] Private Pay 0 / 99 PA    1 [id number] 04/16/2025 04/16/2025 04/16/2025 LORazepam (Tablet) 30.0 30 0.5 MG NA KENNETH CLAUDETTE [employer] Medicare 0 / 99 PA    1 [id number] 04/11/2025 04/11/2025 04/11/2025 oxyCODONE HCL (Tablet) 30.0 3 5 MG 75.0 KENNETH CLAUDETTE [employer] Private Pay 0 / 99 PA    1 [id number] 04/07/2025 04/07/2025 04/07/2025 OxyCONTIN (Tablet, Extended Release) 30.0 15 10 MG 30.0 [facility]

## 2025-07-03 ENCOUNTER — OFFICE VISIT (OUTPATIENT)
Dept: FAMILY MEDICINE CLINIC | Facility: CLINIC | Age: 80
End: 2025-07-03
Payer: COMMERCIAL

## 2025-07-03 VITALS
SYSTOLIC BLOOD PRESSURE: 112 MMHG | DIASTOLIC BLOOD PRESSURE: 78 MMHG | HEART RATE: 81 BPM | OXYGEN SATURATION: 97 % | TEMPERATURE: 97.6 F | HEIGHT: 58 IN | WEIGHT: 117 LBS | BODY MASS INDEX: 24.56 KG/M2

## 2025-07-03 DIAGNOSIS — S22.080S T12 COMPRESSION FRACTURE, SEQUELA: ICD-10-CM

## 2025-07-03 DIAGNOSIS — G89.29 CHRONIC MIDLINE LOW BACK PAIN WITHOUT SCIATICA: ICD-10-CM

## 2025-07-03 DIAGNOSIS — M54.9 MID BACK PAIN: ICD-10-CM

## 2025-07-03 DIAGNOSIS — Z78.9 TRANSITION OF CARE: Primary | ICD-10-CM

## 2025-07-03 DIAGNOSIS — I10 PRIMARY HYPERTENSION: ICD-10-CM

## 2025-07-03 DIAGNOSIS — F11.20 OPIOID DEPENDENCE, UNCOMPLICATED (HCC): ICD-10-CM

## 2025-07-03 DIAGNOSIS — S32.010D COMPRESSION FRACTURE OF L1 VERTEBRA WITH ROUTINE HEALING, SUBSEQUENT ENCOUNTER: ICD-10-CM

## 2025-07-03 DIAGNOSIS — S32.000D CLOSED COMPRESSION FRACTURE OF LUMBOSACRAL SPINE WITH ROUTINE HEALING, SUBSEQUENT ENCOUNTER: ICD-10-CM

## 2025-07-03 DIAGNOSIS — I25.10 3-VESSEL CAD: ICD-10-CM

## 2025-07-03 DIAGNOSIS — M54.50 CHRONIC MIDLINE LOW BACK PAIN WITHOUT SCIATICA: Primary | ICD-10-CM

## 2025-07-03 DIAGNOSIS — Z79.4 TYPE 2 DIABETES MELLITUS WITH HYPERGLYCEMIA, WITH LONG-TERM CURRENT USE OF INSULIN (HCC): ICD-10-CM

## 2025-07-03 DIAGNOSIS — G89.29 CHRONIC MIDLINE LOW BACK PAIN WITHOUT SCIATICA: Primary | ICD-10-CM

## 2025-07-03 DIAGNOSIS — E11.65 TYPE 2 DIABETES MELLITUS WITH HYPERGLYCEMIA, WITH LONG-TERM CURRENT USE OF INSULIN (HCC): ICD-10-CM

## 2025-07-03 DIAGNOSIS — I50.22 CHRONIC SYSTOLIC HEART FAILURE (HCC): ICD-10-CM

## 2025-07-03 DIAGNOSIS — M54.50 CHRONIC MIDLINE LOW BACK PAIN WITHOUT SCIATICA: ICD-10-CM

## 2025-07-03 PROCEDURE — 99495 TRANSJ CARE MGMT MOD F2F 14D: CPT | Performed by: FAMILY MEDICINE

## 2025-07-03 RX ORDER — OXYCODONE HYDROCHLORIDE 5 MG/1
5 TABLET ORAL EVERY 4 HOURS PRN
Qty: 30 TABLET | Refills: 0 | Status: SHIPPED | OUTPATIENT
Start: 2025-07-03 | End: 2025-07-14

## 2025-07-03 RX ORDER — MORPHINE SULFATE 15 MG/1
15 TABLET, FILM COATED, EXTENDED RELEASE ORAL 2 TIMES DAILY
Qty: 60 TABLET | Refills: 0 | Status: SHIPPED | OUTPATIENT
Start: 2025-07-03 | End: 2025-07-14

## 2025-07-03 RX ORDER — OXYCODONE HYDROCHLORIDE 15 MG/1
15 TABLET, FILM COATED, EXTENDED RELEASE ORAL 2 TIMES DAILY
COMMUNITY
End: 2025-07-03 | Stop reason: ALTCHOICE

## 2025-07-03 RX ORDER — CHLORDIAZEPOXIDE HYDROCHLORIDE 5 MG/1
5 CAPSULE, GELATIN COATED ORAL 2 TIMES DAILY
Qty: 180 CAPSULE | Refills: 1 | Status: SHIPPED | OUTPATIENT
Start: 2025-07-03

## 2025-07-03 RX ORDER — MORPHINE SULFATE 15 MG/1
TABLET, FILM COATED, EXTENDED RELEASE ORAL
COMMUNITY
Start: 2025-06-04 | End: 2025-07-03 | Stop reason: SDUPTHER

## 2025-07-03 NOTE — PROGRESS NOTES
Transition of Care Visit:  Name: Jayshree Bo      : 1945      MRN: [id number]  Encounter Provider: Ludwig Pope MD  Encounter Date: 7/3/2025   Encounter department: [facility]    Assessment & Plan  Transition of care         Chronic midline low back pain without sciatica  Still with pain though improved after kyphoplasty of T12, L1 and L5.  Patient without clear understanding of her medications, but is taking pain medications on a regular basis because of his confusion, I will see patient back in 1 week-patient will bring in all of her medications for us to review.  She will follow-up with pain management.  Continue present treatment in the interim       Mid back pain  Still with pain though improved after kyphoplasty of T12, L1 and L5.  Patient without clear understanding of her medications, but is taking pain medications on a regular basis because of his confusion, I will see patient back in 1 week-patient will bring in all of her medications for us to review.  She will follow-up with pain management.  Continue present treatment in the interim       T12 compression fracture, sequela  Movement status post kyphoplasty.  Follow-up with pain management.  Treat osteoporosis     Compression fracture of L1 vertebra with routine healing, subsequent encounter  Improved status post kyphoplasty.  Follow-up with pain management.  Treat osteoporosis       Type 2 diabetes mellitus with hyperglycemia, with long-term current use of insulin ([facility])  A1c had been poorly controlled though patient states that it has improved with changing diet while on rehab, and adjustment in her insulin.  Recheck labs.  Follow-up 1 week  Lab Results   Component Value Date    HGBA1C 10.1 (H) 2025       Orders:  •  Comprehensive metabolic panel; Future  •  CBC and differential; Future  •  Hemoglobin A1C; Future    3-vessel CAD  Has been asymptomatic.  Continue present treatment.  Recheck 1  week  Orders:  •  Comprehensive metabolic panel; Future  •  CBC and differential; Future    Opioid dependence, uncomplicated (HCC)  I reviewed PDMP-unclear regimen patient is taking it present.  She will follow-up in 1 week to review all medications.  She will also follow-up with pain management       Chronic systolic heart failure (HCC)  Wt Readings from Last 3 Encounters:   07/03/25 53.1 kg (117 lb)   02/19/25 64 kg (141 lb)   02/12/25 63.7 kg (140 lb 8 oz)   Patient has lost 20+ pounds since being in rehab though notes that it is improving.  No evidence of fluid overload on exam.  We will continue to monitor.  Recheck 1 week       Primary hypertension  Blood pressure is stable.  No lightheadedness.  Continue present treatment.            History of Present Illness     Transitional Care Management Review:   Jayshree Bo is a 79 y.o. female here for TCM follow up.     During the TCM phone call patient stated:  TCM Call (since 6/25/2025)     Date and time call was made  6/25/2025  7:50 AM    Hospital care reviewed  Records reviewed    Patient was hospitialized at  Other (comment)    Comment  -[facility]    Date of Admission  03/06/25    Date of discharge  06/24/25    Diagnosis  fracture of spine    Disposition  Home    Were the patients medications reviewed and updated  Yes    Current Symptoms  None      TCM Call (since 6/25/2025)     Post hospital issues  None    Scheduled for follow up?  Yes    Did you obtain your prescribed medications  Yes    Do you need help managing your prescriptions or medications  No    Is transportation to your appointment needed  No    I have advised the patient to call PCP with any new or worsening symptoms  LAURA Lockett        Pt here for TCM visit  - 78 yo female with hx of DMII, diffuse DDD and osteoporosis with hx of compression fractures had been experiencing diffuse back pain due to the above prior to admission on 3/3, pain was so severe  that pt was taken to [facility] ED where she was found to be in significant pain. Initial work up showed severe L3 compression fx with 8mm retropulsion of the disc. BG was also elevated. Pt was admitted for pain control. She initially refused surgical/IR intervention. She was stared on tapering decadron dose, Robaxin, and lidoderm patch.  Insulin dose was adjusted due to hyperglycemia. All other meds were continued.  Pain control improved.  Neurosurgery was consulted but pt refused intervention.  By 3/6, pt was stable and discharged to [facility].  While in rehab, pt was seen and followed by pain management.  Further eval showed evidence of new T12,L1 and L5 compression fractures which were felt to be contributing to her pain.  Pt agreed to intervention, and underwent Kyphoplasty on 5/14.  T11 and L3 fractures were felt to be too old to undergo this procedure. Pt was started on an opioid based pain regimen and had some improvement. By 6/24, pt was able to be discharged to home.  Pt is here for TCM visit  - since discharge, pt has been struggling with her mood. Her  has passed away and pt had to move back to [place]. She is getting VNA as well as family support. She is not clear re: her medications and does not have a present meds list. Attempt to review med list was incomplete at best. She is moving with pain and is getting help with meals.   - pt is getting some home PT, but is only getting it once a week. Pt is ambulating better, but balance is poor.  Pt is using cane  - pt was not eating well at the rehab facility and lost 24lb. Pt reports that he Bgs improved as well. Weight has increased since returning home  - I reviewed available hospital records, lab results and study reports with pt          Review of Systems   Constitutional:  Positive for activity change and fatigue. Negative for appetite change, chills and fever.   HENT: Negative.     Eyes: Negative.   "  Respiratory: Negative.     Cardiovascular: Negative.    Gastrointestinal: Negative.    Genitourinary: Negative.    Musculoskeletal:  Positive for arthralgias, back pain, gait problem and myalgias.   Skin: Negative.    Neurological:  Positive for weakness (generalized) and numbness. Negative for dizziness, light-headedness and headaches.     Objective   /78   Pulse 81   Temp 97.6 °F (36.4 °C)   Ht 4' 9.6\" (1.463 m)   Wt 53.1 kg (117 lb)   SpO2 97%   BMI 24.79 kg/m²     Physical Exam  Vitals reviewed.   Constitutional:       Comments: Uncomfortable appearing female in no resp distress   HENT:      Head: Normocephalic.      Right Ear: Tympanic membrane, ear canal and external ear normal.      Left Ear: Tympanic membrane, ear canal and external ear normal.      Nose: Nose normal.      Mouth/Throat:      Mouth: Mucous membranes are moist.      Pharynx: No posterior oropharyngeal erythema.     Eyes:      General: No scleral icterus.     Extraocular Movements: Extraocular movements intact.      Conjunctiva/sclera: Conjunctivae normal.      Pupils: Pupils are equal, round, and reactive to light.       Cardiovascular:      Rate and Rhythm: Normal rate and regular rhythm.   Pulmonary:      Effort: Pulmonary effort is normal.      Breath sounds: Normal breath sounds. No rales.   Abdominal:      General: There is no distension.      Palpations: There is no mass.      Tenderness: There is no abdominal tenderness.     Musculoskeletal:         General: Tenderness and deformity present.      Cervical back: No tenderness.      Right lower leg: No edema.      Left lower leg: No edema.      Comments: TTP along various areas of the thoracic and lumbar spines and paraspinal muscles. (+) markedly increased thoracic kyphosis   Lymphadenopathy:      Cervical: No cervical adenopathy.     Skin:     General: Skin is warm.      Capillary Refill: Capillary refill takes less than 2 seconds.     Neurological:      Cranial Nerves: No " cranial nerve deficit.      Sensory: No sensory deficit.      Motor: No weakness.      Gait: Gait abnormal (antalgic appearing gait).      Deep Tendon Reflexes: Reflexes normal (symmetrical in legs).     Psychiatric:         Behavior: Behavior normal.         Thought Content: Thought content normal.         Judgment: Judgment normal.      Comments: Worsening depression since losing her  and moving from her previous apartment       Medications have been reviewed by provider in current encounter

## 2025-07-07 ENCOUNTER — TELEPHONE (OUTPATIENT)
Age: 80
End: 2025-07-07

## 2025-07-07 DIAGNOSIS — M54.50 CHRONIC MIDLINE LOW BACK PAIN WITHOUT SCIATICA: ICD-10-CM

## 2025-07-07 DIAGNOSIS — G89.29 CHRONIC MIDLINE LOW BACK PAIN WITHOUT SCIATICA: ICD-10-CM

## 2025-07-07 DIAGNOSIS — R10.2 PELVIC PAIN: Primary | ICD-10-CM

## 2025-07-07 NOTE — TELEPHONE ENCOUNTER
Shannon from [facility] called to report that the patient's blood pressure today is 176/79. The patient is experiencing pelvic pain and would like to know if the doctor can order an X-ray to check for any fractures, as the patient is having difficulty walking and getting into bed.    The provider can contact Shannon at [phone]

## 2025-07-08 ENCOUNTER — TELEPHONE (OUTPATIENT)
Age: 80
End: 2025-07-08

## 2025-07-08 NOTE — TELEPHONE ENCOUNTER
Tammy visiting nurse called to report patients blood pressure today 148/86 manual ,patient asymptomatic.

## 2025-07-08 NOTE — TELEPHONE ENCOUNTER
Left a detailed message for Jayshree, asked for call back to confirm it was received. Also spoke to [employer] and they were provided the update as well.

## 2025-07-08 NOTE — TELEPHONE ENCOUNTER
"Patient returned call to office in regards to previous message.  Patient wanted to know why the Xrays were ordered.  I explained the [facility] nurse called in regards to them.  Patient stated \" she calls in to the Dr. Carrasco for every little thing.\"  I did let patient know that they do have to call the doctor and  let him know.    Patient stated that she will come into the office and  the 2 scripts for the Xrays when she has a moment as she goes to [facility] for her imaging.      "

## 2025-07-09 PROBLEM — S22.080D COMPRESSION FRACTURE OF T11 VERTEBRA WITH ROUTINE HEALING: Status: ACTIVE | Noted: 2023-04-14

## 2025-07-09 PROBLEM — S22.080S T12 COMPRESSION FRACTURE, SEQUELA: Status: ACTIVE | Noted: 2025-06-17

## 2025-07-09 PROBLEM — S32.030A COMPRESSION FRACTURE OF L3 VERTEBRA (HCC): Status: ACTIVE | Noted: 2025-03-04

## 2025-07-09 PROBLEM — M80.00XD AGE-RELATED OSTEOPOROSIS WITH CURRENT PATHOLOGICAL FRACTURE WITH ROUTINE HEALING: Status: ACTIVE | Noted: 2023-04-28

## 2025-07-09 PROBLEM — S32.000A COMPRESSION FRACTURE OF LUMBAR VERTEBRA (HCC): Status: ACTIVE | Noted: 2025-06-17

## 2025-07-09 PROBLEM — I50.22 CHRONIC SYSTOLIC HEART FAILURE (HCC): Status: ACTIVE | Noted: 2023-08-23

## 2025-07-09 NOTE — ASSESSMENT & PLAN NOTE
Still with pain though improved after kyphoplasty of T12, L1 and L5.  Patient without clear understanding of her medications, but is taking pain medications on a regular basis because of his confusion, I will see patient back in 1 week-patient will bring in all of her medications for us to review.  She will follow-up with pain management.  Continue present treatment in the interim

## 2025-07-09 NOTE — ASSESSMENT & PLAN NOTE
A1c had been poorly controlled though patient states that it has improved with changing diet while on rehab, and adjustment in her insulin.  Recheck labs.  Follow-up 1 week  Lab Results   Component Value Date    HGBA1C 10.1 (H) 03/04/2025       Orders:  •  Comprehensive metabolic panel; Future  •  CBC and differential; Future  •  Hemoglobin A1C; Future

## 2025-07-09 NOTE — ASSESSMENT & PLAN NOTE
Wt Readings from Last 3 Encounters:   07/03/25 53.1 kg (117 lb)   02/19/25 64 kg (141 lb)   02/12/25 63.7 kg (140 lb 8 oz)   Patient has lost 20+ pounds since being in rehab though notes that it is improving.  No evidence of fluid overload on exam.  We will continue to monitor.  Recheck 1 week

## 2025-07-09 NOTE — ASSESSMENT & PLAN NOTE
Has been asymptomatic.  Continue present treatment.  Recheck 1 week  Orders:  •  Comprehensive metabolic panel; Future  •  CBC and differential; Future

## 2025-07-11 ENCOUNTER — OFFICE VISIT (OUTPATIENT)
Dept: FAMILY MEDICINE CLINIC | Facility: CLINIC | Age: 80
End: 2025-07-11
Payer: COMMERCIAL

## 2025-07-11 VITALS
SYSTOLIC BLOOD PRESSURE: 140 MMHG | HEART RATE: 80 BPM | OXYGEN SATURATION: 94 % | DIASTOLIC BLOOD PRESSURE: 80 MMHG | WEIGHT: 116 LBS | HEIGHT: 58 IN | TEMPERATURE: 96.6 F | BODY MASS INDEX: 24.35 KG/M2

## 2025-07-11 DIAGNOSIS — G89.29 CHRONIC MIDLINE LOW BACK PAIN WITHOUT SCIATICA: Primary | ICD-10-CM

## 2025-07-11 DIAGNOSIS — Z87.81 HISTORY OF VERTEBRAL COMPRESSION FRACTURE: ICD-10-CM

## 2025-07-11 DIAGNOSIS — S32.000D CLOSED COMPRESSION FRACTURE OF LUMBOSACRAL SPINE WITH ROUTINE HEALING, SUBSEQUENT ENCOUNTER: ICD-10-CM

## 2025-07-11 DIAGNOSIS — M54.50 CHRONIC MIDLINE LOW BACK PAIN WITHOUT SCIATICA: Primary | ICD-10-CM

## 2025-07-11 DIAGNOSIS — E11.65 TYPE 2 DIABETES MELLITUS WITH HYPERGLYCEMIA, WITH LONG-TERM CURRENT USE OF INSULIN (HCC): ICD-10-CM

## 2025-07-11 DIAGNOSIS — Z79.4 TYPE 2 DIABETES MELLITUS WITH HYPERGLYCEMIA, WITH LONG-TERM CURRENT USE OF INSULIN (HCC): ICD-10-CM

## 2025-07-11 DIAGNOSIS — I10 PRIMARY HYPERTENSION: ICD-10-CM

## 2025-07-11 DIAGNOSIS — I25.10 3-VESSEL CAD: ICD-10-CM

## 2025-07-11 DIAGNOSIS — R06.02 SHORTNESS OF BREATH: ICD-10-CM

## 2025-07-11 PROCEDURE — 99214 OFFICE O/P EST MOD 30 MIN: CPT | Performed by: FAMILY MEDICINE

## 2025-07-11 PROCEDURE — G2211 COMPLEX E/M VISIT ADD ON: HCPCS | Performed by: FAMILY MEDICINE

## 2025-07-11 RX ORDER — REPAGLINIDE 0.5 MG/1
0.5 TABLET ORAL
COMMUNITY
End: 2025-07-25

## 2025-07-11 RX ORDER — ATENOLOL 25 MG/1
25 TABLET ORAL 2 TIMES DAILY
COMMUNITY
End: 2025-07-25 | Stop reason: ALTCHOICE

## 2025-07-11 NOTE — PROGRESS NOTES
Name: Jayshree Bo      : 1945      MRN: [id number]  Encounter Provider: Ludwig Pope MD  Encounter Date: 2025   Encounter department: [facility]    Assessment & Plan  Chronic midline low back pain without sciatica  Secondary to DDD and compression fx. Continue present pain management ergimen. F/u with pain mangement  Orders:  •  oxyCODONE (Roxicodone) 5 immediate release tablet; Take 1 tablet (5 mg total) by mouth every 4 (four) hours as needed for moderate pain Max Daily Amount: 30 mg  •  morphine (MS CONTIN) 15 mg 12 hr tablet; Take 1 tablet (15 mg total) by mouth 2 (two) times a day Max Daily Amount: 30 mg    Closed compression fracture of lumbosacral spine with routine healing, subsequent encounter  Multiple. Continue present care.  F/u with pain management  Orders:  •  oxyCODONE (Roxicodone) 5 immediate release tablet; Take 1 tablet (5 mg total) by mouth every 4 (four) hours as needed for moderate pain Max Daily Amount: 30 mg  •  morphine (MS CONTIN) 15 mg 12 hr tablet; Take 1 tablet (15 mg total) by mouth 2 (two) times a day Max Daily Amount: 30 mg    Type 2 diabetes mellitus with hyperglycemia, with long-term current use of insulin (HCC)  BG better at home.  Recheck labs including A1C  Lab Results   Component Value Date    HGBA1C 10.1 (H) 2025   Orders:  •  Insulin Glargine Solostar (Lantus SoloStar) 100 UNIT/ML SOPN; Inject 0.4 mL (40 Units total) under the skin daily at bedtime    3-vessel CAD  Asymptomatic at present. Continue present care. F/u with Cardio       Primary hypertension  Borderline control. Continue present care. Recheck 6w       History of vertebral compression fracture  Improved s/p kyphoplasty but still with pain. Continue present care. Treat osteoporosis. Recheck 6w       Shortness of breath  Stable. Continue present inhalers. Recheck 3m  Orders:  •  Ventolin  (90 Base) MCG/ACT inhaler; Inhale 2 puffs every 6 (six)  "hours as needed for wheezing         History of Present Illness     f/u multiple med issues  - pt brought in a med list but still is not sure that it is accurate.   - back pain is better s/p kyphoplasty, but not resoilved. Still gets pain in her ankles with prolonged standing. Some numbness in toes  - pt denies any new cardiac symptoms.   - pt notes that home BG are typically in 130s.  Compliant with meds/insulin.  Pt denies any hypoglycemia   - previous arterial duplex in January showed mild-mopderate PAD. She denies worsening claudication      Review of Systems  Past Medical History[1]  Past Surgical History[2]  Family History[3]  Social History[4]  Medications[5]  Allergies   Allergen Reactions   • Atorvastatin      Reaction Date: 23Dec2011;   Unknown reaction  LFT increased   • Clarithromycin      Reaction Date: 23Dec2011;   Patient does not recall reaction   • Hydrogen Peroxide Swelling     Reaction Date: 23Dec2011;   Swelling in the mouth   • Levofloxacin      Patient does not recall her reaction   • Other Swelling     Perioxol (mouth rinse)  Perioxol (mouth rinse)   • Penicillins Hives and Swelling   • Rosuvastatin      Reaction Date: 23Dec2011;   Unknown reaction per patient  Increase LFT     Immunization History   Administered Date(s) Administered   • COVID-19 PFIZER VACCINE 0.3 ML IM 02/17/2021, 03/10/2021, 12/09/2021   • Tuberculin Skin Test-PPD Intradermal 04/14/2008     Objective   /80   Pulse 80   Temp (!) 96.6 °F (35.9 °C)   Ht 4' 9.6\" (1.463 m)   Wt 52.6 kg (116 lb)   SpO2 94%   BMI 24.58 kg/m²     Physical Exam           [1]  Past Medical History:  Diagnosis Date   • Gastroenteritis    [2]  Past Surgical History:  Procedure Laterality Date   • ATRIAL CARDIAC PACEMAKER INSERTION     [3]  Family History  Problem Relation Name Age of Onset   • Diabetes Sister     • Dementia Sister     [4]  Social History  Tobacco Use   • Smoking status: Former   • Smokeless tobacco: Never   Vaping Use   • " Vaping status: Never Used   Substance and Sexual Activity   • Alcohol use: Not Currently   • Drug use: Never   [5]  Current Outpatient Medications on File Prior to Visit   Medication Sig   • ascorbic acid (VITAMIN C) 500 mg tablet Take by mouth in the morning.   • Aspirin Low Dose 81 MG EC tablet TAKE 1 TABLET BY MOUTH DAILY   • atenolol (TENORMIN) 25 mg tablet Take 25 mg by mouth in the morning and 25 mg before bedtime.   • BD Pen Needle Lili 2nd Gen 32G X 4 MM MISC Inject under the skin every morning   • chlordiazePOXIDE (LIBRIUM) 5 mg capsule Take 1 capsule (5 mg total) by mouth 2 (two) times a day   • Cholecalciferol (VITAMIN D-3 PO) Take by mouth   • Diclofenac Sodium (VOLTAREN) 1 % Apply 2 g topically 4 (four) times a day   • ezetimibe (ZETIA) 10 mg tablet TAKE 1 TABLET BY MOUTH DAILY   • Fluticasone-Umeclidin-Vilant (TRELEGY ELLIPTA IN) Inhale   • imipramine (TOFRANIL) 25 mg tablet TAKE 1 TABLET BY MOUTH EVERY MORNING AND 2 TABLETS AT BEDTIME   • losartan (COZAAR) 50 mg tablet TAKE 1 TABLET BY MOUTH TWICE DAILY   • Omega-3 Fatty Acids (FISH OIL PO) Take by mouth   • pramipexole (MIRAPEX) 0.75 MG tablet TAKE 1 TABLET BY MOUTH EVERY EVENING   • repaglinide (PRANDIN) 0.5 mg tablet Take 0.5 mg by mouth 3 (three) times a day before meals   • [DISCONTINUED] Insulin Glargine Solostar (Lantus SoloStar) 100 UNIT/ML SOPN Inject 0.4 mL (40 Units total) under the skin daily at bedtime   • glucose blood (Contour Next Test) test strip Patient tests blood sugars twice daily (Patient not taking: Reported on 7/11/2025)   • metoprolol succinate (TOPROL-XL) 25 mg 24 hr tablet Take 50 mg by mouth daily   • Microlet Lancets MISC Test two times daily (Patient not taking: Reported on 7/11/2025)   • [DISCONTINUED] furosemide (LASIX) 20 mg tablet TAKE 1 TABLET(20 MG) BY MOUTH DAILY (Patient not taking: Reported on 7/11/2025)   • [DISCONTINUED] ipratropium-albuterol (DUO-NEB) 0.5-2.5 mg/3 mL nebulizer solution  (Patient not taking:  Reported on 7/3/2025)   • [DISCONTINUED] morphine (MS CONTIN) 15 mg 12 hr tablet Take 1 tablet (15 mg total) by mouth 2 (two) times a day Max Daily Amount: 30 mg (Patient not taking: Reported on 7/11/2025)   • [DISCONTINUED] oxyCODONE (Roxicodone) 5 immediate release tablet Take 1 tablet (5 mg total) by mouth every 4 (four) hours as needed for moderate pain Max Daily Amount: 30 mg (Patient not taking: Reported on 7/11/2025)   • [DISCONTINUED] pantoprazole (PROTONIX) 40 mg tablet TAKE 1 TABLET BY MOUTH DAILY (Patient not taking: Reported on 7/11/2025)   • [DISCONTINUED] polyethylene glycol (GLYCOLAX) 17 GM/SCOOP powder Take 17 g by mouth daily as needed (constipation) (Patient not taking: Reported on 7/11/2025)   • [DISCONTINUED] traMADol (Ultram) 50 mg tablet Take 1 tablet (50 mg total) by mouth every 6 (six) hours as needed for moderate pain (Patient not taking: Reported on 7/11/2025)   • [DISCONTINUED] Ventolin  (90 Base) MCG/ACT inhaler INHALE 2 PUFFS BY MOUTH EVERY 6 HOURS AS NEEDED FOR WHEEZING (Patient not taking: Reported on 7/11/2025)   • [DISCONTINUED] Vitamin E 400 units TABS Take 2 tablets by mouth in the morning. (Patient not taking: Reported on 7/11/2025)

## 2025-07-14 RX ORDER — INSULIN GLARGINE 100 [IU]/ML
40 INJECTION, SOLUTION SUBCUTANEOUS
Qty: 30 ML | Refills: 1 | Status: SHIPPED | OUTPATIENT
Start: 2025-07-14

## 2025-07-14 RX ORDER — CYANOCOBALAMIN (VITAMIN B-12) 500 MCG
2 LOZENGE ORAL DAILY
Qty: 30 TABLET | Refills: 5 | Status: SHIPPED | OUTPATIENT
Start: 2025-07-14

## 2025-07-14 RX ORDER — ALBUTEROL SULFATE 90 UG/1
2 AEROSOL, METERED RESPIRATORY (INHALATION) EVERY 6 HOURS PRN
Qty: 18 G | Refills: 5 | Status: SHIPPED | OUTPATIENT
Start: 2025-07-14

## 2025-07-14 RX ORDER — OXYCODONE HYDROCHLORIDE 5 MG/1
5 TABLET ORAL EVERY 4 HOURS PRN
Qty: 30 TABLET | Refills: 0 | Status: SHIPPED | OUTPATIENT
Start: 2025-07-14 | End: 2025-07-25 | Stop reason: SDUPTHER

## 2025-07-14 RX ORDER — MORPHINE SULFATE 15 MG/1
15 TABLET, FILM COATED, EXTENDED RELEASE ORAL 2 TIMES DAILY
Qty: 60 TABLET | Refills: 0 | Status: SHIPPED | OUTPATIENT
Start: 2025-07-14 | End: 2025-07-25 | Stop reason: SDUPTHER

## 2025-07-14 NOTE — ASSESSMENT & PLAN NOTE
Improved s/p kyphoplasty but still with pain. Continue present care. Treat osteoporosis. Recheck 6w

## 2025-07-14 NOTE — ASSESSMENT & PLAN NOTE
BG better at home.  Recheck labs including A1C  Lab Results   Component Value Date    HGBA1C 10.1 (H) 03/04/2025   Orders:  •  Insulin Glargine Solostar (Lantus SoloStar) 100 UNIT/ML SOPN; Inject 0.4 mL (40 Units total) under the skin daily at bedtime

## 2025-07-14 NOTE — ASSESSMENT & PLAN NOTE
Secondary to DDD and compression fx. Continue present pain management ergimen. F/u with pain mangement  Orders:  •  oxyCODONE (Roxicodone) 5 immediate release tablet; Take 1 tablet (5 mg total) by mouth every 4 (four) hours as needed for moderate pain Max Daily Amount: 30 mg  •  morphine (MS CONTIN) 15 mg 12 hr tablet; Take 1 tablet (15 mg total) by mouth 2 (two) times a day Max Daily Amount: 30 mg

## 2025-07-15 ENCOUNTER — TELEPHONE (OUTPATIENT)
Age: 80
End: 2025-07-15

## 2025-07-16 DIAGNOSIS — E78.2 MIXED HYPERLIPIDEMIA: ICD-10-CM

## 2025-07-16 DIAGNOSIS — I25.10 3-VESSEL CAD: ICD-10-CM

## 2025-07-16 RX ORDER — EZETIMIBE 10 MG/1
10 TABLET ORAL DAILY
Qty: 90 TABLET | Refills: 1 | Status: SHIPPED | OUTPATIENT
Start: 2025-07-16

## 2025-07-16 RX ORDER — ASPIRIN 81 MG/1
81 TABLET ORAL DAILY
Qty: 90 TABLET | Refills: 1 | Status: SHIPPED | OUTPATIENT
Start: 2025-07-16

## 2025-07-16 NOTE — TELEPHONE ENCOUNTER
Reason for call:   [x] Refill   [] Prior Auth  [] Other:     Office:   [x] PCP/Provider - Ludwig Pope   [] Specialty/Provider -     Medication: Aspirin low dose EC  Dose/Frequency: 81 mg Daily   Quantity: 90    Medication: Ezetimibe  Dose/Frequency: 10 mg Daily   Quantity: 30    Ph[facility] [place]    Local Pharmacy   Does the patient have enough for 3 days?   [x] Yes   [] No - Send as HP to POD    Mail Away Pharmacy   Does the patient have enough for 10 days?   [] Yes   [] No - Send as HP to POD

## 2025-07-17 ENCOUNTER — TELEPHONE (OUTPATIENT)
Age: 80
End: 2025-07-17
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
## 2025-07-18 ENCOUNTER — TELEPHONE (OUTPATIENT)
Age: 80
End: 2025-07-18

## 2025-07-18 NOTE — TELEPHONE ENCOUNTER
[facility] called in to check on the status of this request.  No further action.
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
## 2025-07-21 ENCOUNTER — TELEPHONE (OUTPATIENT)
Age: 80
End: 2025-07-21
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
## 2025-07-22 ENCOUNTER — NURSE TRIAGE (OUTPATIENT)
Age: 80
End: 2025-07-22

## 2025-07-22 DIAGNOSIS — R42 VERTIGO: Primary | ICD-10-CM

## 2025-07-22 RX ORDER — MECLIZINE HYDROCHLORIDE 25 MG/1
25 TABLET ORAL 3 TIMES DAILY PRN
COMMUNITY
Start: 2025-07-15 | End: 2025-07-22 | Stop reason: SDUPTHER

## 2025-07-22 RX ORDER — MECLIZINE HYDROCHLORIDE 25 MG/1
25 TABLET ORAL EVERY 8 HOURS PRN
Qty: 30 TABLET | Refills: 1 | Status: SHIPPED | OUTPATIENT
Start: 2025-07-22 | End: 2026-07-22

## 2025-07-22 NOTE — TELEPHONE ENCOUNTER
Patient called back and stated she is able to come in for an appointment this week.     Please call patient back to schedule. Only appointments available are same-day on 07/25.

## 2025-07-22 NOTE — TELEPHONE ENCOUNTER
"Reason for Disposition   Taking a medicine that could cause dizziness (e.g., blood pressure medications, diuretics)    Answer Assessment - Initial Assessment Questions  1. DESCRIPTION: \"Describe your dizziness.\"          Dizziness when she moves       2. LIGHTHEADED: \"Do you feel lightheaded?\" (e.g., somewhat faint, woozy, weak upon standing)         Denies          3. VERTIGO: \"Do you feel like either you or the room is spinning or tilting?\" (i.e., vertigo)            4. SEVERITY: \"How bad is it?\"  \"Do you feel like you are going to faint?\" \"Can you stand and walk?\"          Moderate      5. ONSET:  \"When did the dizziness begin?\"        1 week ago       6. AGGRAVATING FACTORS: \"Does anything make it worse?\" (e.g., standing, change in head position)            Yes         7. HEART RATE: \"Can you tell me your heart rate?\" \"How many beats in 15 seconds?\"  (Note: Not all patients can do this.)              8. CAUSE: \"What do you think is causing the dizziness?\" (e.g., decreased fluids or food, diarrhea, emotional distress, heat exposure, new medicine, sudden standing, vomiting; unknown)       Unsure       9. RECURRENT SYMPTOM: \"Have you had dizziness before?\" If Yes, ask: \"When was the last time?\" \"What happened that time?\"          Unsure     10. OTHER SYMPTOMS: \"Do you have any other symptoms?\" (e.g., fever, chest pain, vomiting, diarrhea, bleeding)          Denies    Protocols used: Dizziness-Adult-[place]      REASON FOR CONVERSATION: Dizziness    SYMPTOMS: dizzy    OTHER HEALTH INFORMATION:       Patient calls stating she continues with episodes of dizziness .  Today it happened twice and lasted a few seconds.  Patient denies any other symptoms headaches, fainting. Vision or balance problems,weakness, numbness ,  shortness of breath, chest pain.  Meclizine prescription sent to the pharmacy. Patient made aware,      Patient is diabetic and has HTN.  Blood sugar today was 97 per patient. No cuff available.    Reviewed " signs and symptoms of when to head to the Emergency Room . Patient verbalized understanding. Home care advice given.    Blood sugar  97 blood pressure     PROTOCOL DISPOSITION: No disposition on file.    CARE ADVICE PROVIDED:     See above    PRACTICE FOLLOW-UP:     yes
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
## 2025-07-23 NOTE — TELEPHONE ENCOUNTER
[facility] from [facility] called to report Dr. Carrasco she is with the patient today and her blood pressure is elevated 150/70  and she still having pain on the left shoulder 8/10

## 2025-07-25 ENCOUNTER — TELEPHONE (OUTPATIENT)
Age: 80
End: 2025-07-25

## 2025-07-25 ENCOUNTER — OFFICE VISIT (OUTPATIENT)
Dept: FAMILY MEDICINE CLINIC | Facility: CLINIC | Age: 80
End: 2025-07-25

## 2025-07-25 VITALS
DIASTOLIC BLOOD PRESSURE: 86 MMHG | BODY MASS INDEX: 23.09 KG/M2 | SYSTOLIC BLOOD PRESSURE: 120 MMHG | TEMPERATURE: 97.2 F | HEIGHT: 58 IN | WEIGHT: 110 LBS | HEART RATE: 81 BPM | OXYGEN SATURATION: 95 %

## 2025-07-25 DIAGNOSIS — R42 VERTIGO: Primary | ICD-10-CM

## 2025-07-25 DIAGNOSIS — M25.512 ACUTE PAIN OF LEFT SHOULDER: Primary | ICD-10-CM

## 2025-07-25 DIAGNOSIS — F11.90 OPIOID USE: ICD-10-CM

## 2025-07-25 DIAGNOSIS — G89.29 CHRONIC MIDLINE LOW BACK PAIN WITHOUT SCIATICA: ICD-10-CM

## 2025-07-25 DIAGNOSIS — H61.23 BILATERAL IMPACTED CERUMEN: ICD-10-CM
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
## 2025-07-25 DIAGNOSIS — M54.50 CHRONIC MIDLINE LOW BACK PAIN WITHOUT SCIATICA: ICD-10-CM

## 2025-07-25 DIAGNOSIS — S32.000D CLOSED COMPRESSION FRACTURE OF LUMBOSACRAL SPINE WITH ROUTINE HEALING, SUBSEQUENT ENCOUNTER: ICD-10-CM

## 2025-07-25 RX ORDER — MORPHINE SULFATE 15 MG/1
15 TABLET, FILM COATED, EXTENDED RELEASE ORAL 2 TIMES DAILY
Qty: 60 TABLET | Refills: 0 | Status: SHIPPED | OUTPATIENT
Start: 2025-07-25

## 2025-07-25 RX ORDER — OXYCODONE HYDROCHLORIDE 5 MG/1
5 TABLET ORAL EVERY 4 HOURS PRN
Qty: 30 TABLET | Refills: 0 | Status: SHIPPED | OUTPATIENT
Start: 2025-07-25

## 2025-07-25 RX ORDER — FUROSEMIDE 20 MG/1
20 TABLET ORAL 2 TIMES DAILY
COMMUNITY

## 2025-07-25 RX ORDER — PANTOPRAZOLE SODIUM 40 MG/1
40 TABLET, DELAYED RELEASE ORAL DAILY
COMMUNITY
End: 2025-08-07 | Stop reason: SDUPTHER

## 2025-07-25 NOTE — TELEPHONE ENCOUNTER
Tammy with [facility] calling while with patient to report patients pain is a 7/10 in left shoulder. Also wanted to inform Primary Care Provider that blood pressure is 160/84. Patient took her morning medications around 6:30. Patient has upcoming OV today with Primary Care Provider at 11:15

## 2025-07-29 ENCOUNTER — TELEPHONE (OUTPATIENT)
Age: 80
End: 2025-07-29

## 2025-08-04 ENCOUNTER — EVALUATION (OUTPATIENT)
Dept: PHYSICAL THERAPY | Facility: CLINIC | Age: 80
End: 2025-08-04
Attending: FAMILY MEDICINE
Payer: COMMERCIAL

## 2025-08-04 DIAGNOSIS — R42 VERTIGO: Primary | ICD-10-CM

## 2025-08-04 PROCEDURE — 97161 PT EVAL LOW COMPLEX 20 MIN: CPT | Performed by: PHYSICAL THERAPIST

## 2025-08-04 PROCEDURE — 95992 CANALITH REPOSITIONING PROC: CPT | Performed by: PHYSICAL THERAPIST

## 2025-08-07 ENCOUNTER — EVALUATION (OUTPATIENT)
Dept: PHYSICAL THERAPY | Facility: CLINIC | Age: 80
End: 2025-08-07
Attending: FAMILY MEDICINE
Payer: COMMERCIAL

## 2025-08-07 DIAGNOSIS — R42 VERTIGO: ICD-10-CM

## 2025-08-07 DIAGNOSIS — R13.10 DYSPHAGIA, UNSPECIFIED TYPE: Primary | ICD-10-CM

## 2025-08-07 DIAGNOSIS — G25.81 RESTLESS LEG: ICD-10-CM

## 2025-08-07 DIAGNOSIS — M25.512 ACUTE PAIN OF LEFT SHOULDER: Primary | ICD-10-CM

## 2025-08-07 PROCEDURE — 97112 NEUROMUSCULAR REEDUCATION: CPT | Performed by: PHYSICAL THERAPIST

## 2025-08-07 PROCEDURE — 97162 PT EVAL MOD COMPLEX 30 MIN: CPT | Performed by: PHYSICAL THERAPIST

## 2025-08-07 RX ORDER — MECLIZINE HYDROCHLORIDE 25 MG/1
25 TABLET ORAL EVERY 8 HOURS PRN
Qty: 30 TABLET | Refills: 1 | Status: SHIPPED | OUTPATIENT
Start: 2025-08-07 | End: 2026-08-07

## 2025-08-07 RX ORDER — PANTOPRAZOLE SODIUM 40 MG/1
40 TABLET, DELAYED RELEASE ORAL DAILY
Qty: 90 TABLET | Refills: 1 | Status: SHIPPED | OUTPATIENT
Start: 2025-08-07

## 2025-08-07 RX ORDER — PRAMIPEXOLE DIHYDROCHLORIDE 0.75 MG/1
0.75 TABLET ORAL EVERY EVENING
Qty: 30 TABLET | Refills: 5 | Status: SHIPPED | OUTPATIENT
Start: 2025-08-07

## 2025-08-12 ENCOUNTER — TELEPHONE (OUTPATIENT)
Age: 80
End: 2025-08-12

## 2025-08-19 DIAGNOSIS — F41.9 ANXIETY: ICD-10-CM

## 2025-08-19 RX ORDER — CHLORDIAZEPOXIDE HYDROCHLORIDE 5 MG/1
5 CAPSULE, GELATIN COATED ORAL 2 TIMES DAILY
Qty: 180 CAPSULE | Refills: 0 | Status: SHIPPED | OUTPATIENT
Start: 2025-08-19